# Patient Record
Sex: FEMALE | Race: BLACK OR AFRICAN AMERICAN | NOT HISPANIC OR LATINO | Employment: UNEMPLOYED | URBAN - METROPOLITAN AREA
[De-identification: names, ages, dates, MRNs, and addresses within clinical notes are randomized per-mention and may not be internally consistent; named-entity substitution may affect disease eponyms.]

---

## 2021-05-28 ENCOUNTER — TRANSFERRED RECORDS (OUTPATIENT)
Dept: HEALTH INFORMATION MANAGEMENT | Facility: CLINIC | Age: 51
End: 2021-05-28

## 2021-07-30 ENCOUNTER — TRANSFERRED RECORDS (OUTPATIENT)
Dept: HEALTH INFORMATION MANAGEMENT | Facility: CLINIC | Age: 51
End: 2021-07-30

## 2021-08-11 ENCOUNTER — TRANSFERRED RECORDS (OUTPATIENT)
Dept: HEALTH INFORMATION MANAGEMENT | Facility: CLINIC | Age: 51
End: 2021-08-11

## 2022-05-19 ENCOUNTER — TRANSFERRED RECORDS (OUTPATIENT)
Dept: HEALTH INFORMATION MANAGEMENT | Facility: CLINIC | Age: 52
End: 2022-05-19

## 2022-11-15 DIAGNOSIS — J84.9 ILD (INTERSTITIAL LUNG DISEASE) (H): Primary | ICD-10-CM

## 2022-12-09 ENCOUNTER — MEDICAL CORRESPONDENCE (OUTPATIENT)
Dept: HEALTH INFORMATION MANAGEMENT | Facility: CLINIC | Age: 52
End: 2022-12-09

## 2022-12-14 ENCOUNTER — TELEPHONE (OUTPATIENT)
Dept: PULMONOLOGY | Facility: CLINIC | Age: 52
End: 2022-12-14

## 2022-12-14 ENCOUNTER — TRANSCRIBE ORDERS (OUTPATIENT)
Dept: OTHER | Age: 52
End: 2022-12-14

## 2022-12-14 DIAGNOSIS — J84.9 ILD (INTERSTITIAL LUNG DISEASE) (H): Primary | ICD-10-CM

## 2022-12-14 NOTE — TELEPHONE ENCOUNTER
[facility] Call Center    Phone Message    May a detailed message be left on voicemail: yes     Reason for Call: Appointment Intake      Referring Provider Name: Dr James Lyons     Diagnosis and/or Symptoms: New ILD     ASAP referral     Action Taken: Message routed to:  [facility] ([facility] Pulm.    Travel Screening: Not Applicable

## 2022-12-15 NOTE — TELEPHONE ENCOUNTER
Contacted patient regarding referral.  She is currently scheduled to see Dr. Bustillo on February 1, 2023 and is requesting a sooner appointment.  Informed patient that we will call her if anything becomes available sooner. Patient is not in distress.  Patient would like an ABG done before she travels.  States that an order from the [facility] was sent to us for an ABG.  Writer left a message for nurse in pulmonary clinic at [facility] requesting an updated order to be faxed to the PFT lab or ILD department.

## 2022-12-19 DIAGNOSIS — J84.9 ILD (INTERSTITIAL LUNG DISEASE) (H): Primary | ICD-10-CM

## 2022-12-19 LAB
BASE EXCESS BLDA CALC-SCNC: 1.7 MMOL/L (ref -9.6–2)
COHGB MFR BLD: 0.4 % (ref 0–2)
HCO3 BLDA-SCNC: 26 MMOL/L (ref 21–28)
HGB BLD-MCNC: 13.5 G/DL (ref 11.7–15.7)
METHGB MFR BLD: 0.1 % (ref 0–3)
O2/TOTAL GAS SETTING VFR VENT: 21 %
OXYHGB MFR BLDA: 95 % (ref 92–100)
PCO2 BLDA: 41 MM HG (ref 35–45)
PH BLDA: 7.42 [PH] (ref 7.35–7.45)
PO2 BLDA: 88 MM HG (ref 80–105)

## 2022-12-19 PROCEDURE — 82810 BLOOD GASES O2 SAT ONLY: CPT | Performed by: PATHOLOGY

## 2022-12-19 PROCEDURE — 82375 ASSAY CARBOXYHB QUANT: CPT | Performed by: PATHOLOGY

## 2022-12-19 PROCEDURE — 36600 WITHDRAWAL OF ARTERIAL BLOOD: CPT | Performed by: INTERNAL MEDICINE

## 2022-12-19 PROCEDURE — 83050 HGB METHEMOGLOBIN QUAN: CPT | Performed by: PATHOLOGY

## 2022-12-19 PROCEDURE — 85018 HEMOGLOBIN: CPT | Performed by: PATHOLOGY

## 2022-12-19 PROCEDURE — 82803 BLOOD GASES ANY COMBINATION: CPT | Mod: 91 | Performed by: PATHOLOGY

## 2022-12-20 LAB — FIO2-PRE: 21 %

## 2023-01-30 ENCOUNTER — DOCUMENTATION ONLY (OUTPATIENT)
Dept: PULMONOLOGY | Facility: CLINIC | Age: 53
End: 2023-01-30
Payer: COMMERCIAL

## 2023-01-30 NOTE — PROGRESS NOTES
Action 1.30.23 MJ 3:37 PM.    Action Taken Received CD from [facility]. Sent to  for processing.

## 2023-01-31 NOTE — PROGRESS NOTES
[facility] Interstitial [facility] Disease [facility]    Reason for Visit  Angelica Jett is a 52 year old year old female who is being seen for known polymyositis related ILD.   HPI  Patient is 52 years old female [place] from [place] was in usual state of health until she delivered a healthy child back in 2005 and developed interstitial lung disease and associated muscle pain.  At the time, she reportedly had a lung biopsy in [place] which in association with polymyositis clinical diagnosis [reportedly had thigh MRI], received a diagnosis of ILD associated with polymyositis.  Her main immunosuppressive regimen has been mycophenolate mofetil, which has been decreased back in May 2022 to 1500 mg/day which appears to be due to PFT stability and symptom improvement.  Earlier in the disease, lung transplant was considered as she was needing oxygen, but as she responded to immunosuppression lung transplant has been deferred.  She has also been receiving pulmonary rehab with significant improvement in symptoms.  She admits to some interruption in her mycophenolate and with that her symptoms worsened back in 2022.  She also has known hepatitis B receiving Entecavir with normal liver function as of late 2022.  She works as a  and has no occupational exposures.  She is a lifetime non-smoker and denies secondhand smoke exposure.  She denies triggers for hypersensitivity pneumonitis.  She also had pulmonary hypertension (2013) and is needed Adcirca and revatio, but they were discontinued based on improvement in her pulmonary pressures on right heart cath 2019.    Review of Systems   Constitutional: Positive for weight loss. Negative for chills, fever and malaise/fatigue.   HENT: Negative for congestion, hearing loss and sore throat.    Respiratory: Positive for shortness of breath. Negative for cough, hemoptysis, sputum production and wheezing.    Gastrointestinal: Negative for heartburn.  "  Musculoskeletal: Negative for myalgias.   Skin: Negative for itching and rash.   Neurological: Negative for tremors, speech change and headaches.   Endo/Heme/Allergies: Does not bruise/bleed easily.   Psychiatric/Behavioral: Negative for memory loss.      Past medical history  Polymyositis 2005  Secondary pulmonary arterial hypertension, resolved in 2019    Past surgical history  Lung biopsy 2005  Right heart cath, most recent 2019    Social History     Socioeconomic History     Marital status:      Spouse name: Not on file     Number of children: Not on file     Years of education: Not on file     Highest education level: Not on file   Occupational History     Not on file   Tobacco Use     Smoking status: Not on file     Smokeless tobacco: Not on file   Substance and Sexual Activity     Alcohol use: Not on file     Drug use: Not on file     Sexual activity: Not on file   Other Topics Concern     Not on file   Social History Narrative     Not on file     Social Determinants of Health     Financial Resource Strain: Not on file   Food Insecurity: Not on file   Transportation Needs: Not on file   Physical Activity: Not on file   Stress: Not on file   Social Connections: Not on file   Intimate Partner Violence: Not on file   Housing Stability: Not on file       Family history  Negative for lupus, rheumatoid arthritis, Sjogren, scleroderma, or other connective tissue diseases    Vitals: /71   Pulse 73   Resp 17   Ht 1.676 m (5' 6\")   Wt 72.8 kg (160 lb 9.6 oz)   SpO2 98%   BMI 25.92 kg/m          Exam:   GENERAL APPEARANCE: Well developed, well nourished, alert, and in no apparent distress.  LYMPHATICS: No significant cervical, or supraclavicular nodes.  HEENT: Normal oral mucus membranes, tongue and dentition. Normal nose.   RESP: good air flow throughout.  Fine inspiratory rales more pronounced in the bases.  CV: Normal S1, S2, regular rhythm, normal rate. No murmur.  No LE edema.   MS: extremities " normal. No clubbing. No cyanosis.  SKIN: no rash on limited exam.  NEURO: Mentation intact, speech normal, normal gait and stance.  PSYCH: mentation appears normal. and affect normal/bright.    Results:    Chest imaging:  Reviewed high-resolution CT chest images from today 2/1/2023 with the patient and compared to May 2021  Unchanged typical UIP pattern with extensive honeycombing in both lower lobes, and subpleural distribution tracking up the upper lobes.  The CT chest from May 2021 did show mild diffuse groundglass opacities, favoring pulmonary edema at the time.      PFTs today 2/1/23    My interpretation: Moderate to severe intrathoracic restrictive disease    PFTs from outside May 2022  FVC 1.91  FEV1 1.61  FEV1/FVC 84  TLC 2.98  DLCO 7.74    PFTs from outside May 2021  FVC 1.86  FEV1 1.51  FEV1/FVC 81  TLC 3.22  DLCO 5.58    PFTs from outside May 2019  FEV1 1.51-59%, FVC 1.92-61%  TLC 2.15-41%  DLCO 31%    Echo from outside showed mild RV dysfunction and pulmonary artery systolic pressure 20-25  She has history of pulmonary arterial hypertension that followed pregnancy 2005, however a repeat right heart cath in 2019 showed no residual pulm hypertension    Right heart cath May 2018  RA 3  RV 35/8  PA 36/15 [23] from a previous mean high of 47 in 2011, but PCWP was 27 at the time  PCWP 11  PVR 2.2    Previous pulmonary hypertension therapies  Adcirca and revatio to 2013    Assessment and plan:   1. ILD and polymyositis  Patient is 52 years old female with known history of polymyositis since 2005 following pregnancy, and lung biopsy at the time with established UIP pattern.  The patient has recently moved in from [place] and would like to establish pulmonary, rheumatology and hepatology care at our institution.  Back in May 2022, her local pulmonologist at the time reduced her mycophenolate to 1000 mg in the morning and 500 in the evening, which appears to be due to PFT stability and symptomatic improvement.   She used to need oxygen at 4 L/min with activity, however on 6-minute walk today she did not need oxygen. I also see marked improvement in DLCO, which could be a combination effect of compliance with MMF and pulmonary rehab. CT shows UIP pattern today that is unchanged compared to 5/2021 but had suspected pulmonary edema superimposed back then.   Plan:  - Continue current MMF dose (1500 mg/d)  - MMF monitoring labs   - CBC for DLCO correction  - CMP, CRP, CK   - For UIP pattern, will check anti-CCP and ANCA r/o overlap   - Discussed Ofev which I am recommending, but given stability in symptoms and PFTs, the patient wants to consider it next visit. If we see decline in PFTs, we would strongly recommend it at the time. Ofev in the setting of hepatitis B usually can be given at a reduced dose with coordination with hepatology.   - Pulmonary rehab referral   - PFTs and six mins walk in 6 months   - Rheumatology referral to establish care locally     2. Hepatitis B on entecavir  - CMP per above  - Hepatology referral     3. Previous secondary PAH, revatio and adcirc discontinued in 2019 based on improvements in RHC  - NtproBNP today     4. Immunization  - Pneumovax 23 today   - COVID 4th dose at patient's pharmacy     Return in 6 months     I spent 60 minutes reviewing chart, reviewing test results, talking with and examining patient, formulating plan, and documentation on the day of the encounter.          Answers for HPI/ROS submitted by the patient on 2/1/2023  General Symptoms: No  EYE SYMPTOMS: No  HEART SYMPTOMS: No  LUNG SYMPTOMS: Yes  INTESTINAL SYMPTOMS: No  URINARY SYMPTOMS: No  GYNECOLOGIC SYMPTOMS: No  BREAST SYMPTOMS: No  SKELETAL SYMPTOMS: Yes  BLOOD SYMPTOMS: No  MENTAL HEALTH SYMPTOMS: No

## 2023-02-01 ENCOUNTER — LAB (OUTPATIENT)
Dept: LAB | Facility: CLINIC | Age: 53
End: 2023-02-01
Payer: COMMERCIAL

## 2023-02-01 ENCOUNTER — ANCILLARY PROCEDURE (OUTPATIENT)
Dept: CT IMAGING | Facility: CLINIC | Age: 53
End: 2023-02-01
Attending: INTERNAL MEDICINE
Payer: COMMERCIAL

## 2023-02-01 ENCOUNTER — OFFICE VISIT (OUTPATIENT)
Dept: PULMONOLOGY | Facility: CLINIC | Age: 53
End: 2023-02-01
Attending: PHYSICIAN ASSISTANT
Payer: COMMERCIAL

## 2023-02-01 VITALS
OXYGEN SATURATION: 98 % | HEART RATE: 73 BPM | BODY MASS INDEX: 25.81 KG/M2 | DIASTOLIC BLOOD PRESSURE: 71 MMHG | WEIGHT: 160.6 LBS | RESPIRATION RATE: 17 BRPM | SYSTOLIC BLOOD PRESSURE: 111 MMHG | HEIGHT: 66 IN

## 2023-02-01 DIAGNOSIS — J84.9 ILD (INTERSTITIAL LUNG DISEASE) (H): ICD-10-CM

## 2023-02-01 DIAGNOSIS — B18.1 CHRONIC VIRAL HEPATITIS B WITHOUT DELTA AGENT AND WITHOUT COMA (H): Primary | ICD-10-CM

## 2023-02-01 DIAGNOSIS — M60.10: ICD-10-CM

## 2023-02-01 DIAGNOSIS — J84.9 ILD (INTERSTITIAL LUNG DISEASE) (H): Primary | ICD-10-CM

## 2023-02-01 DIAGNOSIS — Z23 IMMUNIZATION DUE: ICD-10-CM

## 2023-02-01 LAB
6 MIN WALK (FT): 1150 FT
6 MIN WALK (M): 351 M
ALBUMIN SERPL BCG-MCNC: 4.1 G/DL (ref 3.5–5.2)
ALP SERPL-CCNC: 86 U/L (ref 35–104)
ALT SERPL W P-5'-P-CCNC: 17 U/L (ref 10–35)
ANION GAP SERPL CALCULATED.3IONS-SCNC: 13 MMOL/L (ref 7–15)
AST SERPL W P-5'-P-CCNC: 37 U/L (ref 10–35)
BASOPHILS # BLD AUTO: 0.1 10E3/UL (ref 0–0.2)
BASOPHILS NFR BLD AUTO: 1 %
BILIRUB SERPL-MCNC: 0.3 MG/DL
BUN SERPL-MCNC: 12.6 MG/DL (ref 6–20)
CALCIUM SERPL-MCNC: 9.8 MG/DL (ref 8.6–10)
CHLORIDE SERPL-SCNC: 103 MMOL/L (ref 98–107)
CK SERPL-CCNC: 145 U/L (ref 26–192)
CREAT SERPL-MCNC: 0.82 MG/DL (ref 0.51–0.95)
CRP SERPL-MCNC: 5.78 MG/L
DEPRECATED HCO3 PLAS-SCNC: 23 MMOL/L (ref 22–29)
DLCOUNC-%PRED-PRE: 53 %
DLCOUNC-PRE: 11.97 ML/MIN/MMHG
DLCOUNC-PRED: 22.24 ML/MIN/MMHG
EOSINOPHIL # BLD AUTO: 0.1 10E3/UL (ref 0–0.7)
EOSINOPHIL NFR BLD AUTO: 1 %
ERV-%PRED-PRE: 65 %
ERV-PRE: 0.61 L
ERV-PRED: 0.93 L
ERYTHROCYTE [DISTWIDTH] IN BLOOD BY AUTOMATED COUNT: 14.6 % (ref 10–15)
EXPTIME-PRE: 4.5 SEC
FEF2575-%PRED-PRE: 72 %
FEF2575-PRE: 1.85 L/SEC
FEF2575-PRED: 2.57 L/SEC
FEFMAX-%PRED-PRE: 76 %
FEFMAX-PRE: 5.33 L/SEC
FEFMAX-PRED: 6.94 L/SEC
FEV1-%PRED-PRE: 56 %
FEV1-PRE: 1.52 L
FEV1FEV6-PRE: 87 %
FEV1FEV6-PRED: 82 %
FEV1FVC-PRE: 87 %
FEV1FVC-PRED: 81 %
FEV1SVC-PRE: 81 %
FEV1SVC-PRED: 73 %
FIFMAX-PRE: 5.27 L/SEC
FRCPLETH-%PRED-PRE: 73 %
FRCPLETH-PRE: 2.07 L
FRCPLETH-PRED: 2.81 L
FVC-%PRED-PRE: 52 %
FVC-PRE: 1.75 L
FVC-PRED: 3.33 L
GFR SERPL CREATININE-BSD FRML MDRD: 85 ML/MIN/1.73M2
GLUCOSE SERPL-MCNC: 95 MG/DL (ref 70–99)
HCT VFR BLD AUTO: 44.2 % (ref 35–47)
HGB BLD-MCNC: 13.6 G/DL (ref 11.7–15.7)
IC-%PRED-PRE: 46 %
IC-PRE: 1.27 L
IC-PRED: 2.74 L
IMM GRANULOCYTES # BLD: 0 10E3/UL
IMM GRANULOCYTES NFR BLD: 0 %
LYMPHOCYTES # BLD AUTO: 2.9 10E3/UL (ref 0.8–5.3)
LYMPHOCYTES NFR BLD AUTO: 29 %
MCH RBC QN AUTO: 24.6 PG (ref 26.5–33)
MCHC RBC AUTO-ENTMCNC: 30.8 G/DL (ref 31.5–36.5)
MCV RBC AUTO: 80 FL (ref 78–100)
MONOCYTES # BLD AUTO: 0.7 10E3/UL (ref 0–1.3)
MONOCYTES NFR BLD AUTO: 7 %
NEUTROPHILS # BLD AUTO: 6.3 10E3/UL (ref 1.6–8.3)
NEUTROPHILS NFR BLD AUTO: 62 %
NRBC # BLD AUTO: 0 10E3/UL
NRBC BLD AUTO-RTO: 0 /100
NT-PROBNP SERPL-MCNC: 170 PG/ML (ref 0–900)
PLATELET # BLD AUTO: 133 10E3/UL (ref 150–450)
POTASSIUM SERPL-SCNC: 4.7 MMOL/L (ref 3.4–5.3)
PROT SERPL-MCNC: 8.6 G/DL (ref 6.4–8.3)
RBC # BLD AUTO: 5.53 10E6/UL (ref 3.8–5.2)
RVPLETH-%PRED-PRE: 77 %
RVPLETH-PRE: 1.46 L
RVPLETH-PRED: 1.88 L
SODIUM SERPL-SCNC: 139 MMOL/L (ref 136–145)
TLCPLETH-%PRED-PRE: 63 %
TLCPLETH-PRE: 3.33 L
TLCPLETH-PRED: 5.27 L
VA-%PRED-PRE: 48 %
VA-PRE: 2.58 L
VC-%PRED-PRE: 51 %
VC-PRE: 1.87 L
VC-PRED: 3.67 L
WBC # BLD AUTO: 10.1 10E3/UL (ref 4–11)

## 2023-02-01 PROCEDURE — 83880 ASSAY OF NATRIURETIC PEPTIDE: CPT | Performed by: PATHOLOGY

## 2023-02-01 PROCEDURE — 250N000011 HC RX IP 250 OP 636: Performed by: INTERNAL MEDICINE

## 2023-02-01 PROCEDURE — 86036 ANCA SCREEN EACH ANTIBODY: CPT | Mod: 90 | Performed by: PATHOLOGY

## 2023-02-01 PROCEDURE — 80053 COMPREHEN METABOLIC PANEL: CPT | Performed by: PATHOLOGY

## 2023-02-01 PROCEDURE — 86038 ANTINUCLEAR ANTIBODIES: CPT | Mod: 90 | Performed by: PATHOLOGY

## 2023-02-01 PROCEDURE — 86200 CCP ANTIBODY: CPT | Mod: 90 | Performed by: PATHOLOGY

## 2023-02-01 PROCEDURE — 71250 CT THORAX DX C-: CPT | Performed by: RADIOLOGY

## 2023-02-01 PROCEDURE — 99205 OFFICE O/P NEW HI 60 MIN: CPT | Mod: 25 | Performed by: INTERNAL MEDICINE

## 2023-02-01 PROCEDURE — 94726 PLETHYSMOGRAPHY LUNG VOLUMES: CPT | Performed by: INTERNAL MEDICINE

## 2023-02-01 PROCEDURE — 86140 C-REACTIVE PROTEIN: CPT | Performed by: PATHOLOGY

## 2023-02-01 PROCEDURE — G0463 HOSPITAL OUTPT CLINIC VISIT: HCPCS | Mod: 25 | Performed by: INTERNAL MEDICINE

## 2023-02-01 PROCEDURE — 86256 FLUORESCENT ANTIBODY TITER: CPT | Mod: 90 | Performed by: PATHOLOGY

## 2023-02-01 PROCEDURE — 36415 COLL VENOUS BLD VENIPUNCTURE: CPT | Performed by: PATHOLOGY

## 2023-02-01 PROCEDURE — 90732 PPSV23 VACC 2 YRS+ SUBQ/IM: CPT | Performed by: INTERNAL MEDICINE

## 2023-02-01 PROCEDURE — 94729 DIFFUSING CAPACITY: CPT | Performed by: INTERNAL MEDICINE

## 2023-02-01 PROCEDURE — 99000 SPECIMEN HANDLING OFFICE-LAB: CPT | Performed by: PATHOLOGY

## 2023-02-01 PROCEDURE — 94375 RESPIRATORY FLOW VOLUME LOOP: CPT | Performed by: INTERNAL MEDICINE

## 2023-02-01 PROCEDURE — 85025 COMPLETE CBC W/AUTO DIFF WBC: CPT | Performed by: PATHOLOGY

## 2023-02-01 PROCEDURE — G0009 ADMIN PNEUMOCOCCAL VACCINE: HCPCS | Performed by: INTERNAL MEDICINE

## 2023-02-01 PROCEDURE — 82550 ASSAY OF CK (CPK): CPT | Performed by: PATHOLOGY

## 2023-02-01 PROCEDURE — 94618 PULMONARY STRESS TESTING: CPT | Performed by: INTERNAL MEDICINE

## 2023-02-01 RX ORDER — MYCOPHENOLATE MOFETIL 500 MG/1
1500 TABLET ORAL
COMMUNITY
Start: 2022-11-01 | End: 2023-10-12

## 2023-02-01 RX ORDER — FLUTICASONE FUROATE AND VILANTEROL 100; 25 UG/1; UG/1
1 POWDER RESPIRATORY (INHALATION) DAILY
COMMUNITY
Start: 2021-02-23 | End: 2023-04-27

## 2023-02-01 RX ORDER — IBUPROFEN 600 MG/1
1 TABLET, FILM COATED ORAL 4 TIMES DAILY PRN
COMMUNITY
Start: 2022-03-31

## 2023-02-01 RX ORDER — ENTECAVIR 0.5 MG/1
TABLET, FILM COATED ORAL
COMMUNITY
End: 2023-05-18

## 2023-02-01 RX ORDER — PREDNISONE 1 MG/1
1 TABLET ORAL
COMMUNITY
Start: 2022-05-19 | End: 2023-04-12

## 2023-02-01 RX ORDER — THIAMINE HCL 100 MG
2 TABLET ORAL
COMMUNITY
Start: 2022-06-06

## 2023-02-01 RX ADMIN — PNEUMOCOCCAL VACCINE POLYVALENT 0.5 ML
25; 25; 25; 25; 25; 25; 25; 25; 25; 25; 25; 25; 25; 25; 25; 25; 25; 25; 25; 25; 25; 25; 25 INJECTION, SOLUTION INTRAMUSCULAR; SUBCUTANEOUS at 09:21

## 2023-02-01 ASSESSMENT — ENCOUNTER SYMPTOMS
FEVER: 0
COUGH: 0
SPUTUM PRODUCTION: 0
SHORTNESS OF BREATH: 1
WHEEZING: 0
MEMORY LOSS: 0
MYALGIAS: 0
HEARTBURN: 0
HEMOPTYSIS: 0
SPEECH CHANGE: 0
WEIGHT LOSS: 1
BRUISES/BLEEDS EASILY: 0
TREMORS: 0
HEADACHES: 0
CHILLS: 0
SORE THROAT: 0

## 2023-02-01 ASSESSMENT — PAIN SCALES - GENERAL: PAINLEVEL: NO PAIN (0)

## 2023-02-01 NOTE — LETTER
Date:February 1, 2023      Patient was self referred, no letter generated. Do not send.        [facility] Health Information

## 2023-02-01 NOTE — LETTER
2/1/2023         RE: Angelica Jett  [address]  [id number]  [place] [address]        Dear Colleague,    Thank you for referring your patient, Angelica Jett, to the [facility]. Please see a copy of my visit note below.    [facility]    Reason for Visit  Angelica Jett is a 52 year old year old female who is being seen for known polymyositis related ILD.   HPI  Patient is 52 years old female [place] was in usual state of health until she delivered a healthy child back in 2005 and developed interstitial lung disease and associated muscle pain.  At the time, she reportedly had a lung biopsy in [place] which in association with polymyositis clinical diagnosis [reportedly had thigh MRI], received a diagnosis of ILD associated with polymyositis.  Her main immunosuppressive regimen has been mycophenolate mofetil, which has been decreased back in May 2022 to 1500 mg/day which appears to be due to PFT stability and symptom improvement.  Earlier in the disease, lung transplant was considered as she was needing oxygen, but as she responded to immunosuppression lung transplant has been deferred.  She has also been receiving pulmonary rehab with significant improvement in symptoms.  She admits to some interruption in her mycophenolate and with that her symptoms worsened back in 2022.  She also has known hepatitis B receiving Entecavir with normal liver function as of late 2022.  She works as a  and has no occupational exposures.  She is a lifetime non-smoker and denies secondhand smoke exposure.  She denies triggers for hypersensitivity pneumonitis.  She also had pulmonary hypertension (2013) and is needed Adcirca and revatio, but they were discontinued based on improvement in her pulmonary pressures on right heart cath 2019.    Review of Systems   Constitutional:  "Positive for weight loss. Negative for chills, fever and malaise/fatigue.   HENT: Negative for congestion, hearing loss and sore throat.    Respiratory: Positive for shortness of breath. Negative for cough, hemoptysis, sputum production and wheezing.    Gastrointestinal: Negative for heartburn.   Musculoskeletal: Negative for myalgias.   Skin: Negative for itching and rash.   Neurological: Negative for tremors, speech change and headaches.   Endo/Heme/Allergies: Does not bruise/bleed easily.   Psychiatric/Behavioral: Negative for memory loss.      Past medical history  Polymyositis 2005  Secondary pulmonary arterial hypertension, resolved in 2019    Past surgical history  Lung biopsy 2005  Right heart cath, most recent 2019    Social History     Socioeconomic History     Marital status:      Spouse name: Not on file     Number of children: Not on file     Years of education: Not on file     Highest education level: Not on file   Occupational History     Not on file   Tobacco Use     Smoking status: Not on file     Smokeless tobacco: Not on file   Substance and Sexual Activity     Alcohol use: Not on file     Drug use: Not on file     Sexual activity: Not on file   Other Topics Concern     Not on file   Social History Narrative     Not on file     Social Determinants of Health     Financial Resource Strain: Not on file   Food Insecurity: Not on file   Transportation Needs: Not on file   Physical Activity: Not on file   Stress: Not on file   Social Connections: Not on file   Intimate Partner Violence: Not on file   Housing Stability: Not on file       Family history  Negative for lupus, rheumatoid arthritis, Sjogren, scleroderma, or other connective tissue diseases    Vitals: /71   Pulse 73   Resp 17   Ht 1.676 m (5' 6\")   Wt 72.8 kg (160 lb 9.6 oz)   SpO2 98%   BMI 25.92 kg/m          Exam:   GENERAL APPEARANCE: Well developed, well nourished, alert, and in no apparent distress.  LYMPHATICS: No " significant cervical, or supraclavicular nodes.  HEENT: Normal oral mucus membranes, tongue and dentition. Normal nose.   RESP: good air flow throughout.  Fine inspiratory rales more pronounced in the bases.  CV: Normal S1, S2, regular rhythm, normal rate. No murmur.  No LE edema.   MS: extremities normal. No clubbing. No cyanosis.  SKIN: no rash on limited exam.  NEURO: Mentation intact, speech normal, normal gait and stance.  PSYCH: mentation appears normal. and affect normal/bright.    Results:    Chest imaging:  Reviewed high-resolution CT chest images from today 2/1/2023 with the patient and compared to May 2021  Unchanged typical UIP pattern with extensive honeycombing in both lower lobes, and subpleural distribution tracking up the upper lobes.  The CT chest from May 2021 did show mild diffuse groundglass opacities, favoring pulmonary edema at the time.      PFTs today 2/1/23    My interpretation: Moderate to severe intrathoracic restrictive disease    PFTs from outside May 2022  FVC 1.91  FEV1 1.61  FEV1/FVC 84  TLC 2.98  DLCO 7.74    PFTs from outside May 2021  FVC 1.86  FEV1 1.51  FEV1/FVC 81  TLC 3.22  DLCO 5.58    PFTs from outside May 2019  FEV1 1.51-59%, FVC 1.92-61%  TLC 2.15-41%  DLCO 31%    Echo from outside showed mild RV dysfunction and pulmonary artery systolic pressure 20-25  She has history of pulmonary arterial hypertension that followed pregnancy 2005, however a repeat right heart cath in 2019 showed no residual pulm hypertension    Right heart cath May 2018  RA 3  RV 35/8  PA 36/15 [23] from a previous mean high of 47 in 2011, but PCWP was 27 at the time  PCWP 11  PVR 2.2    Previous pulmonary hypertension therapies  Adcirca and revatio to 2013    Assessment and plan:   1. ILD and polymyositis  Patient is 52 years old female with known history of polymyositis since 2005 following pregnancy, and lung biopsy at the time with established UIP pattern.  The patient has recently moved in from  [place] and would like to establish pulmonary, rheumatology and hepatology care at our institution.  Back in May 2022, her local pulmonologist at the time reduced her mycophenolate to 1000 mg in the morning and 500 in the evening, which appears to be due to PFT stability and symptomatic improvement.  She used to need oxygen at 4 L/min with activity, however on 6-minute walk today she did not need oxygen. I also see marked improvement in DLCO, which could be a combination effect of compliance with MMF and pulmonary rehab. CT shows UIP pattern today that is unchanged compared to 5/2021 but had suspected pulmonary edema superimposed back then.   Plan:  - Continue current MMF dose (1500 mg/d)  - MMF monitoring labs   - CBC for DLCO correction  - CMP, CRP, CK   - For UIP pattern, will check anti-CCP and ANCA r/o overlap   - Discussed Ofev which I am recommending, but given stability in symptoms and PFTs, the patient wants to consider it next visit. If we see decline in PFTs, we would strongly recommend it at the time. Ofev in the setting of hepatitis B usually can be given at a reduced dose with coordination with hepatology.   - Pulmonary rehab referral   - PFTs and six mins walk in 6 months   - Rheumatology referral to establish care locally     2. Hepatitis B on entecavir  - CMP per above  - Hepatology referral     3. Previous secondary PAH, revatio and adcirc discontinued in 2019 based on improvements in RHC  - NtproBNP today     4. Immunization  - Pneumovax 23 today   - COVID 4th dose at patient's pharmacy     Return in 6 months     I spent 60 minutes reviewing chart, reviewing test results, talking with and examining patient, formulating plan, and documentation on the day of the encounter.          Answers for HPI/ROS submitted by the patient on 2/1/2023  General Symptoms: No  EYE SYMPTOMS: No  HEART SYMPTOMS: No  LUNG SYMPTOMS: Yes  INTESTINAL SYMPTOMS: No  URINARY SYMPTOMS: No  GYNECOLOGIC SYMPTOMS: No  BREAST  SYMPTOMS: No  SKELETAL SYMPTOMS: Yes  BLOOD SYMPTOMS: No  MENTAL HEALTH SYMPTOMS: No          Again, thank you for allowing me to participate in the care of your patient.        Sincerely,        Garrison Bustillo MD

## 2023-02-01 NOTE — NURSING NOTE
Met with patient in clinic after provider visit to provide contact information sheet and introduce role as ILD RN Care Coordinator. Patient appreciative of conversation and direct contact information. Discussed pulm rehab, pt would like to go to [place] location.  Ordered PCP referral to est care and f/u with breast lesion seen on CT scan (unchanged from 5/2021 CT). Pt was seeing providers in [place] and now wants to est care in [place].

## 2023-02-01 NOTE — PROGRESS NOTES
Action 2.1.23 MJ   Action Taken Received CD from [facility]. Sent to [facility] for processing.

## 2023-02-01 NOTE — NURSING NOTE
Chief Complaint   Patient presents with     Consult     New Interstitial Lung    Medications reviewed and vital signs taken.   Jake Miller, BELKIS

## 2023-02-02 LAB
ANA SER QL IF: NEGATIVE
ANCA AB PATTERN SER IF-IMP: NORMAL
C-ANCA TITR SER IF: NORMAL {TITER}
CCP AB SER IA-ACNC: 1.4 U/ML

## 2023-02-02 NOTE — TELEPHONE ENCOUNTER
DIAGNOSIS: Chronic viral hepatitis B without delta agent and without coma    Appt Date: 03.22.2023   NOTES STATUS DETAILS   OFFICE NOTE from referring provider Internal 02.01.2023 Garrison Bustillo MD   OFFICE NOTES from other specialists Care Everywhere 04.13.2022 Vijay Denney MD  [facility]    03.26.2021 Malorie Interiano MD  [facility]   DISCHARGE SUMMARY from hospital     MEDICATION LIST Internal / CE    LIVER BIOSPY (IF APPLICABLE)      PATHOLOGY REPORTS      IMAGING     ENDOSCOPY (IF AVAILABLE) Care Everywhere 05.28.2021   COLONOSCOPY (IF AVAILABLE)     ULTRASOUND LIVER Care Everywhere 04.14.2022, 08.06.2021 US abdomen complete   CT OF ABDOMEN     MRI OF LIVER     FIBROSCAN, US ELASTOGRAPHY, FIBROSIS SCAN, MR ELASTOGRAPHY Care Everywhere 07.07.2021 Fribroscan   LABS     HEPATIC PANEL (LIVER PANEL)     BASIC METABOLIC PANEL Care Everywhere 10.12.2022   COMPLETE METABOLIC PANEL Internal 02.01.2023   COMPLETE BLOOD COUNT (CBC) Internal 02.01.2023   INTERNATIONAL NORMALIZED RATIO (INR)     HEPATITIS C ANTIBODY     HEPATITIS C VIRAL LOAD/PCR     HEPATITIS C GENOTYPE     HEPATITIS B SURFACE ANTIGEN Care Everywhere 11.18.2021   HEPATITIS B SURFACE ANTIBODY Care Everywhere 11.18.2021   HEPATITIS B DNA QUANT LEVEL     HEPATITIS B CORE ANTIBODY Care Everywhere 07.07.2021     Action 02.02.2023 RM   Action Taken Pending images      Action 02.07.2023 RM 1:52P   Action Taken Called [facility] at [phone]  was told to fax to [phone] for images pending for disc.     Action 03.06.2023 RM   Action Taken Images received via disc took to 4n to be uploaded to chart.

## 2023-02-07 NOTE — TELEPHONE ENCOUNTER
NOTES Status Details   OFFICE NOTE from referring provider Internal 02.01.2023 Garrison Bustillo MD   OFFICE NOTE from other specialist     DISCHARGE SUMMARY from hospital     DISCHARGE REPORT from the ER     MEDICATION LIST Internal    LABS (Any and all labs)      Internal    Biopsy/pathology (Anything related to diagnoses I.e. fluid aspirations, lip biopsy, muscle biopsy)               Imaging (All imaging related to diagnoses)     Echo     HRCT Internal 02.01.2023 CT chest high resolution without contrast   CXR     EMG                    Scleroderma/Dermatomyositis diagnoses     Previous Cardiology notes      Previous Pulmonary notes     Previous Dermatology notes     Previous GI notes     Lupus diagnoses     Previous Nephrology notes     Previous Dermatology notes     Previous Cardiology notes

## 2023-02-12 ENCOUNTER — HEALTH MAINTENANCE LETTER (OUTPATIENT)
Age: 53
End: 2023-02-12

## 2023-02-13 ENCOUNTER — OFFICE VISIT (OUTPATIENT)
Dept: FAMILY MEDICINE | Facility: CLINIC | Age: 53
End: 2023-02-13
Attending: INTERNAL MEDICINE
Payer: COMMERCIAL

## 2023-02-13 VITALS
OXYGEN SATURATION: 99 % | HEART RATE: 85 BPM | DIASTOLIC BLOOD PRESSURE: 67 MMHG | RESPIRATION RATE: 20 BRPM | BODY MASS INDEX: 26.03 KG/M2 | WEIGHT: 162 LBS | TEMPERATURE: 98 F | SYSTOLIC BLOOD PRESSURE: 103 MMHG | HEIGHT: 66 IN

## 2023-02-13 DIAGNOSIS — I27.20 PULMONARY HYPERTENSION (H): ICD-10-CM

## 2023-02-13 DIAGNOSIS — R20.2 NUMBNESS AND TINGLING OF FOOT: ICD-10-CM

## 2023-02-13 DIAGNOSIS — R20.0 NUMBNESS AND TINGLING OF FOOT: ICD-10-CM

## 2023-02-13 DIAGNOSIS — Z12.31 VISIT FOR SCREENING MAMMOGRAM: ICD-10-CM

## 2023-02-13 DIAGNOSIS — B18.1 CHRONIC VIRAL HEPATITIS B WITHOUT DELTA AGENT AND WITHOUT COMA (H): ICD-10-CM

## 2023-02-13 DIAGNOSIS — J84.9 ILD (INTERSTITIAL LUNG DISEASE) (H): Primary | ICD-10-CM

## 2023-02-13 LAB
HBA1C MFR BLD: 5.9 % (ref 0–5.6)
VIT B12 SERPL-MCNC: 816 PG/ML (ref 232–1245)

## 2023-02-13 PROCEDURE — 82607 VITAMIN B-12: CPT | Performed by: FAMILY MEDICINE

## 2023-02-13 PROCEDURE — 36415 COLL VENOUS BLD VENIPUNCTURE: CPT | Performed by: FAMILY MEDICINE

## 2023-02-13 PROCEDURE — 99204 OFFICE O/P NEW MOD 45 MIN: CPT | Performed by: FAMILY MEDICINE

## 2023-02-13 PROCEDURE — 83036 HEMOGLOBIN GLYCOSYLATED A1C: CPT | Performed by: FAMILY MEDICINE

## 2023-02-13 ASSESSMENT — PAIN SCALES - GENERAL: PAINLEVEL: NO PAIN (0)

## 2023-02-13 NOTE — PROGRESS NOTES
Assessment & Plan     ILD (interstitial lung disease) (H)  I recommended she continue her current medications and keep following closely with pulmonology.  Looks like she was just seen by them and was referred to rheumatology and pulmonary rehab.  They are recommending repeat PFTs in 6 months.  - Primary Care Referral    Chronic viral hepatitis B without delta agent and without coma (H)  She was recently referred to gastroenterology and is currently on Entecavir      Pulmonary hypertension (H)  Is a history of pulmonary hypertension which has been stable.    Numbness and tingling of foot  We discussed further work-up for the numbness and tingling symptoms in her first and second toes of the right foot and I recommended adding a vitamin B12 and hemoglobin A1c level today.  Last April her A1c was 5.6% and her recent CMP and CBC results were unremarkable through pulmonology.  - Vitamin B12; Future  - Hemoglobin A1c; Future  - Vitamin B12  - Hemoglobin A1c    Visit for screening mammogram  - MA SCREENING DIGITAL BILAT - Future  (s+30); Future           MED REC REQUIRED  Post Medication Reconciliation Status:  Discharge medications reconciled, continue medications without change        Return in about 3 months (around 5/13/2023) for Annual Exam.    Lino Godoy, [facility]   Angelica is a 52 year old, presenting for the following health issues:  Hospital F/U, Establish Care, and Foot Problems (Bilat feet numbness)      HPI     Establish care/foot numbness        She has a medical history significant for interstitial lung disease associated with polymyositis.  She was diagnosed with this in 2005 after having a biopsy when living in [place].  She reports doing well on immunosuppressive therapy, mycophenolate and low-dose prednisone.  She takes Breo as well.  She feels like things are stable from a respiratory standpoint. She is also on Entecavir for chronic hepatitis B.   "She was seen by pulmonology at the [facility] on 2/1/2023 and they referred her to pulmonary rehab and rheumatology.  They recommended PFTs in 6 months.  An echocardiogram in the past showed evidence of pulmonary hypertension with a pulmonary artery systolic pressure of 20-25.    She recently started noticing some numbness and tingling and burning symptoms in her right first and second toes.  The toes appear normal.  They are not swollen or red.  She denies any specific injury.    On 2/1/2023 the CBC showed slightly low platelets of 133, CMP results showed a mildly elevated AST of 37, total CK level is normal, CRP mildly elevated at 5.78, BNP normal and PETER and ANCA IgG testing negative.    Social history: .  She works as an .  Her  is a physician and working in public health at the [facility].  She is originally from [place] moved to the  in the late 1980s    Review of Systems   Constitutional, HEENT, cardiovascular, pulmonary, GI, , musculoskeletal, neuro, skin, endocrine and psych systems are negative, except as otherwise noted.      Objective    /67   Pulse 85   Temp 98  F (36.7  C) (Temporal)   Resp 20   Ht 1.676 m (5' 6\")   Wt 73.5 kg (162 lb)   SpO2 99%   BMI 26.15 kg/m    Body mass index is 26.15 kg/m .  Physical Exam   GENERAL: healthy, alert and no distress  EYES: Eyes grossly normal to inspection, PERRL and conjunctivae and sclerae normal  HENT:  nose and mouth without ulcers or lesions  NECK: no adenopathy, no asymmetry, masses, or scars and thyroid normal to palpation  RESP: Slightly coarse breath sounds throughout- no rales, rhonchi or wheezes  CV: regular rate and rhythm, normal S1 S2, no S3 or S4, no murmur, click or rub, no peripheral edema and peripheral pulses strong  ABDOMEN: soft, nontender, no hepatosplenomegaly, no masses and bowel sounds normal  MS: no gross musculoskeletal defects noted, no edema  SKIN: no suspicious lesions or " rashes  NEURO: Normal strength and tone, mentation intact and speech normal  PSYCH: mentation appears normal, affect normal/bright

## 2023-02-13 NOTE — PROGRESS NOTES
"  {PROVIDER CHARTING PREFERENCE:139434}    Ana Luisa Dominguez is a 52 year old{ACCOMPANIED BY STATEMENT (Optional):021768}, presenting for the following health issues:  Hospital F/U      HPI       Hospital Follow-up Visit:    Hospital/Nursing Home/IP Rehab Facility: {INPT AND SNF DISCHARGE:799320}  Date of Admission: ***  Date of Discharge: ***  Reason(s) for Admission: ***    Was your hospitalization related to COVID-19? {Yes add questions_No:962837}  Problems taking medications regularly:  {NONE DEFAULTED:242506::\"None\"}  Medication changes since discharge: {NONE DEFAULTED:114627::\"None\"}  Problems adhering to non-medication therapy:  {NONE DEFAULTED:118292::\"None\"}    Summary of hospitalization:  {HOSPITAL DISCHARGE SUMMARY INFO:764155::\"[facility] hospital discharge summary reviewed\"}  Diagnostic Tests/Treatments reviewed.  Follow up needed: {NONE DEFAULTED:948295::\"none\"}  Other Healthcare Providers Involved in Patient s Care:         {those currently involved after discharge:108041::\"None\"}  Update since discharge: {IMPROVED DEFAULT:670460::\"improved.\"} {TIP  Include information from family/caregivers, SNF, Care Coordination :853857}        Plan of care communicated with {Communicate Plan to:740104::\"patient\"}     {Reference  Coding guidelines- Moderate Complexity F2F/Video within 7 - 14 days of discharge 6338648, High Complexity F2F/Video within 7 days 1234445 or sjifkl31 days 5583037 :951012}      {additonal problems for provider to add (Optional):564828}  {additonal problems for provider to add (Optional):810349}    Review of Systems   {ROS COMP (Optional):611195}      Objective    There were no vitals taken for this visit.  There is no height or weight on file to calculate BMI.  Physical Exam   {Exam List (Optional):407785}    {Diagnostic Test Results (Optional):859451}    {AMBULATORY ATTESTATION (Optional):815476}            "

## 2023-03-02 ENCOUNTER — HOSPITAL ENCOUNTER (OUTPATIENT)
Dept: CARDIAC REHAB | Facility: CLINIC | Age: 53
Discharge: HOME | End: 2023-03-02
Attending: INTERNAL MEDICINE
Payer: COMMERCIAL

## 2023-03-02 DIAGNOSIS — J84.9 ILD (INTERSTITIAL LUNG DISEASE) (H): ICD-10-CM

## 2023-03-02 PROCEDURE — G0238 OTH RESP PROC, INDIV: HCPCS | Performed by: REHABILITATION PRACTITIONER

## 2023-03-22 ENCOUNTER — PRE VISIT (OUTPATIENT)
Dept: GASTROENTEROLOGY | Facility: CLINIC | Age: 53
End: 2023-03-22

## 2023-03-23 ENCOUNTER — DOCUMENTATION ONLY (OUTPATIENT)
Dept: LAB | Facility: CLINIC | Age: 53
End: 2023-03-23
Payer: COMMERCIAL

## 2023-03-23 DIAGNOSIS — B18.1 CHRONIC VIRAL HEPATITIS B WITHOUT DELTA AGENT AND WITHOUT COMA (H): Primary | ICD-10-CM

## 2023-03-23 DIAGNOSIS — Z11.59 ENCOUNTER FOR SCREENING FOR OTHER VIRAL DISEASES: ICD-10-CM

## 2023-04-03 ENCOUNTER — HOSPITAL ENCOUNTER (OUTPATIENT)
Dept: MAMMOGRAPHY | Facility: CLINIC | Age: 53
Discharge: HOME | End: 2023-04-03
Attending: FAMILY MEDICINE | Admitting: FAMILY MEDICINE
Payer: COMMERCIAL

## 2023-04-03 DIAGNOSIS — Z12.31 VISIT FOR SCREENING MAMMOGRAM: ICD-10-CM

## 2023-04-03 PROCEDURE — 77067 SCR MAMMO BI INCL CAD: CPT

## 2023-04-11 NOTE — PROGRESS NOTES
Rheumatology Clinic Initial Consult  04/12/23    Reason for visit: ILD/polymyositis     HPI:    Angelica Jett is a 52 year old female with a history of  chronic hepatitis B (on Entrcavir), GERD, glucocorticoid induced osteoporosis, polymyositis with interstitial lung disease with irritable bowel syndrome who presents to rheumatology to establish care. [place]. Patient was initially diagnosed with polymyositis and interstitial lung disease in 2005, after presenting with severe bilateral upper and lower extremity proximal muscle weakness. She reports first noticing shortness of breath after giving birth to her baby in 2003 with weakness noted in 2004 after she noticed she could no longer lift her son. She denies presence of rashes or mechanics hands on presentation or later through out the disease course. She had undergone MRI muscles but denies ever having a muscle biopsy. She was initially under the care of Dr. Alan at [facility] in [place] and later at [facility]. Patient reports initially starting treatment with CellCept 1000 mg twice daily and prednisone 60 mg daily. Since diagnosis in 2005 she was able to taper down her prednisone successfully and was able to stop prednisone 1 mg in February 2023. She recalls one flare of polymyositis on prednisone taper in 2009 but didn't experience any additional flare on continued taper. Patient is currently doing well on Cellcept 1500 mg  Daily. Cellcept dose was decreased given stability in PFTs. She denies any issues with muscle weakness. She doesn't feel shortness of breath on regular activity but experiences dyspnea on exertional activity. She sometimes uses supplemental oxygen on exertional activity. Patient was listed on transplant list but her condition stabilized with immunosuppressive therapy. Apparently she has also been receiving pulmonary rehab with improvement in her symptoms. Patient has  hx of chronic hepatitis B on Entecavir - she has negative Hep B e antigen with positive hep B e antibody and low titer HBV quant < 10/ ml. She has scheduled to follow up with hepatology. She has normal renal function. Patient has hx osteoporosis secondary to glucocorticoid use. She was previously on Reclast x 3 yrs but off since 2016. Patient reports intermittent hx of paresthesias in her feet. She also had diagnosis of pulmonary hypertension in 2012 and required Adcirca and revatio that was apparently discontinued given improvement in pulmonary pressures on right heart cath in 2019.  She is tolerating Cellcept well. She also has hx of osteoporosis secondary to glucocorticoid us. She was previously on n Reclast for 3 years but has been off all therapy since about 2016. She continues to take ca and vitamin D. Last DEXA ~5 yrs ago. She hasn't had repeat Dexa scan. She was previously working as . Denies family hx of autoimmune conditions, no smoking or drinking hx in past.     Rest of ROS is negative except in HPI       Past Medical History  Reviewed, per HPI  Past Surgical History:   Procedure Laterality Date     BIOPSY  2005     COLONOSCOPY  05/28/2021     Social History     Socioeconomic History     Marital status:      Spouse name: Not on file     Number of children: Not on file     Years of education: Not on file     Highest education level: Not on file   Occupational History     Not on file   Tobacco Use     Smoking status: Never     Smokeless tobacco: Never   Vaping Use     Vaping status: Not on file   Substance and Sexual Activity     Alcohol use: Never     Drug use: Never     Sexual activity: Yes     Partners: Male     Birth control/protection: None   Other Topics Concern     Parent/sibling w/ CABG, MI or angioplasty before 65F 55M? No   Social History Narrative     Not on file     Social Determinants of Health     Financial Resource Strain: Not on file   Food Insecurity: Not on file    Transportation Needs: Not on file   Physical Activity: Not on file   Stress: Not on file   Social Connections: Not on file   Intimate Partner Violence: Not on file   Housing Stability: Not on file     FHx: Reviewed, per HPI    Medications:  Current Outpatient Medications   Medication     Calcium Citrate-Vitamin D3 315-6.25 MG-MCG TABS     entecavir (BARACLUDE) 0.5 MG tablet     fluticasone-vilanterol (BREO ELLIPTA) 100-25 MCG/ACT inhaler     ibuprofen (ADVIL/MOTRIN) 600 MG tablet     mycophenolate (GENERIC EQUIVALENT) 500 MG tablet     predniSONE (DELTASONE) 1 MG tablet     No current facility-administered medications for this visit.       Allergies:     Allergies   Allergen Reactions     Iodine Hives, Rash and Other (See Comments)     Patient reports this allergy is because she is allergic to shrimp       Shellfish-Derived Products Difficulty breathing and Hives     Shrimp       Tetanus Toxoids        /74 (BP Location: Right arm, Patient Position: Sitting, Cuff Size: Adult Regular)   Pulse 95   Temp 98.9  F (37.2  C) (Oral)   Resp 24   Wt 73.9 kg (163 lb)   SpO2 94%   BMI 26.31 kg/m       Physical Exam:  Constitutional: well-developed, appearing stated age; cooperative,  Skin: no nail pitting, alopecia, rash, nodules or lesions  Eyes: nl EOM, vision, conjunctiva, sclera,   Neck: no mass, non-tender thyroid  Resp: lungs clear to auscultation, breathing comfortably on room air  CV: RRR, no murmurs, rubs or gallops, no edema  GI: soft, non-tender, non-distended  Lymph: no cervical, supraclavicular, or epitrochlear nodes  Neuro: nl cranial nerves, strength, sensation,   Psych: nl judgement, orientation, memory, affect.    MS: The TMJ, neck, shoulder, elbow, wrist, MCP/PIP/DIP, spine, hip, knee, ankle, and MTP/IP joints were examined and found normal. No active synovitis or altered joint anatomy. Full joint ROM. Normal  strength. No dactylitis,  tenosynovitis, enthesopathy.    Lab/Imaging: Reviewed  recent labs and imaging.     Assessment/Plan:     1. ILD and polymyositis:    Angelica Jett is a 52 year old female with history with hx of polymyositis with ILD presents to establish care woth rheumatology at [facility]. The patient recently moved from [place] where she was following rheumatologist Dr. Trupti Ruiz at the the [facility]. She was diagnosed with ILD (UIP pattern based on lung biopsy) and polymyositis in 2005. She is currently on Cellcept 1500 mg once daily - Cellcept was decreased to this given stability in her pulmonary function.She has stopped taking Prednisone since February 2023. Patient has marked improvement in DLCO- recently followed up with pulmonary at [facility]. She is not requiring oxygen with her regular activities - this improvement could be due to both Cellcept and pulmonary rehab. She doesn't have any evidence of muscle weakness on our assessment. I couldn't find myositis serology in her charts - perhaps those were done at her initial diagnosis and we don't have access to her very old charts. I plan to repeat myositis serology as it will be helpful in understanding prognosis. it will also guide for therapeutic in case she develops flaring of the condition in future. We plan to continue current dose of Cellcept as per pulmonary needs since she doesn't have any evidence of myositis other rheumatologic manifestation.      2. Chronic Hepatitis B on Entecavir - patient has referral to see hepatology     3. Hx of secondary PAH- previously on Revatio and Adcirc - Discontinued in 2019 based on improvement in her right heart cath in [place]     4. Osteoporosis secondary to steroid use. Now off of steroids. Continue Ca and vit D. She will need repeat DEXA scan.     5. Check myositis panel and BRITTNEY     Follow up: in 6  Months with Dr. Nabil MD      The patient was seen and staffed with Dr. Nabil MD.       Kingston Ramirez MD  Rheumatology Fellow  Pager [phone]        Attending Note: I  saw and evaluated the patient with Dr. Ramirez. I agree with the assessment and plan.    Kimberley Ferrer MD

## 2023-04-12 ENCOUNTER — PRE VISIT (OUTPATIENT)
Dept: RHEUMATOLOGY | Facility: CLINIC | Age: 53
End: 2023-04-12
Payer: COMMERCIAL

## 2023-04-12 ENCOUNTER — OFFICE VISIT (OUTPATIENT)
Dept: FAMILY MEDICINE | Facility: CLINIC | Age: 53
End: 2023-04-12
Payer: COMMERCIAL

## 2023-04-12 ENCOUNTER — LAB (OUTPATIENT)
Dept: LAB | Facility: CLINIC | Age: 53
End: 2023-04-12
Payer: COMMERCIAL

## 2023-04-12 ENCOUNTER — OFFICE VISIT (OUTPATIENT)
Dept: RHEUMATOLOGY | Facility: CLINIC | Age: 53
End: 2023-04-12
Attending: INTERNAL MEDICINE
Payer: COMMERCIAL

## 2023-04-12 VITALS
DIASTOLIC BLOOD PRESSURE: 68 MMHG | HEART RATE: 88 BPM | BODY MASS INDEX: 25.81 KG/M2 | OXYGEN SATURATION: 96 % | RESPIRATION RATE: 22 BRPM | HEIGHT: 66 IN | SYSTOLIC BLOOD PRESSURE: 104 MMHG | WEIGHT: 160.6 LBS | TEMPERATURE: 98.5 F

## 2023-04-12 VITALS
TEMPERATURE: 98.9 F | HEART RATE: 95 BPM | WEIGHT: 163 LBS | SYSTOLIC BLOOD PRESSURE: 121 MMHG | DIASTOLIC BLOOD PRESSURE: 74 MMHG | RESPIRATION RATE: 24 BRPM | BODY MASS INDEX: 26.31 KG/M2 | OXYGEN SATURATION: 94 %

## 2023-04-12 DIAGNOSIS — J84.9 ILD (INTERSTITIAL LUNG DISEASE) (H): ICD-10-CM

## 2023-04-12 DIAGNOSIS — L02.91 ABSCESS: ICD-10-CM

## 2023-04-12 DIAGNOSIS — M60.10: ICD-10-CM

## 2023-04-12 DIAGNOSIS — B18.1 CHRONIC VIRAL HEPATITIS B WITHOUT DELTA AGENT AND WITHOUT COMA (H): ICD-10-CM

## 2023-04-12 DIAGNOSIS — B19.10 HEPATITIS B VIRUS INFECTION, UNSPECIFIED CHRONICITY: ICD-10-CM

## 2023-04-12 DIAGNOSIS — Z11.59 ENCOUNTER FOR SCREENING FOR OTHER VIRAL DISEASES: ICD-10-CM

## 2023-04-12 DIAGNOSIS — Z11.4 SCREENING FOR HIV (HUMAN IMMUNODEFICIENCY VIRUS): Primary | ICD-10-CM

## 2023-04-12 DIAGNOSIS — L73.2 HIDRADENITIS AXILLARIS: Primary | ICD-10-CM

## 2023-04-12 DIAGNOSIS — I27.20 PULMONARY HYPERTENSION (H): ICD-10-CM

## 2023-04-12 LAB
ALBUMIN SERPL BCG-MCNC: 4.3 G/DL (ref 3.5–5.2)
ALP SERPL-CCNC: 97 U/L (ref 35–104)
ALT SERPL W P-5'-P-CCNC: 19 U/L (ref 10–35)
ANION GAP SERPL CALCULATED.3IONS-SCNC: 9 MMOL/L (ref 7–15)
AST SERPL W P-5'-P-CCNC: 26 U/L (ref 10–35)
BILIRUB DIRECT SERPL-MCNC: <0.2 MG/DL (ref 0–0.3)
BILIRUB SERPL-MCNC: 0.2 MG/DL
BUN SERPL-MCNC: 10 MG/DL (ref 6–20)
CALCIUM SERPL-MCNC: 9.8 MG/DL (ref 8.6–10)
CHLORIDE SERPL-SCNC: 105 MMOL/L (ref 98–107)
CHOLEST SERPL-MCNC: 142 MG/DL
CREAT SERPL-MCNC: 0.79 MG/DL (ref 0.51–0.95)
DEPRECATED HCO3 PLAS-SCNC: 28 MMOL/L (ref 22–29)
ERYTHROCYTE [DISTWIDTH] IN BLOOD BY AUTOMATED COUNT: 14.7 % (ref 10–15)
GFR SERPL CREATININE-BSD FRML MDRD: 89 ML/MIN/1.73M2
GLUCOSE SERPL-MCNC: 95 MG/DL (ref 70–99)
HCT VFR BLD AUTO: 44.8 % (ref 35–47)
HCV AB SERPL QL IA: NONREACTIVE
HDLC SERPL-MCNC: 51 MG/DL
HGB BLD-MCNC: 13.4 G/DL (ref 11.7–15.7)
HIV 1+2 AB+HIV1 P24 AG SERPL QL IA: NONREACTIVE
INR PPP: 0.91 (ref 0.85–1.15)
LDLC SERPL CALC-MCNC: 73 MG/DL
MCH RBC QN AUTO: 24 PG (ref 26.5–33)
MCHC RBC AUTO-ENTMCNC: 29.9 G/DL (ref 31.5–36.5)
MCV RBC AUTO: 80 FL (ref 78–100)
NONHDLC SERPL-MCNC: 91 MG/DL
PLATELET # BLD AUTO: 219 10E3/UL (ref 150–450)
POTASSIUM SERPL-SCNC: 4.3 MMOL/L (ref 3.4–5.3)
PROT SERPL-MCNC: 8.7 G/DL (ref 6.4–8.3)
RBC # BLD AUTO: 5.59 10E6/UL (ref 3.8–5.2)
SODIUM SERPL-SCNC: 142 MMOL/L (ref 136–145)
TRIGL SERPL-MCNC: 91 MG/DL
WBC # BLD AUTO: 8.8 10E3/UL (ref 4–11)

## 2023-04-12 PROCEDURE — 86235 NUCLEAR ANTIGEN ANTIBODY: CPT | Mod: 90 | Performed by: PATHOLOGY

## 2023-04-12 PROCEDURE — 87389 HIV-1 AG W/HIV-1&-2 AB AG IA: CPT | Mod: 90 | Performed by: PATHOLOGY

## 2023-04-12 PROCEDURE — 99204 OFFICE O/P NEW MOD 45 MIN: CPT | Mod: GC | Performed by: STUDENT IN AN ORGANIZED HEALTH CARE EDUCATION/TRAINING PROGRAM

## 2023-04-12 PROCEDURE — 80053 COMPREHEN METABOLIC PANEL: CPT | Performed by: PATHOLOGY

## 2023-04-12 PROCEDURE — 99000 SPECIMEN HANDLING OFFICE-LAB: CPT | Performed by: PATHOLOGY

## 2023-04-12 PROCEDURE — 85027 COMPLETE CBC AUTOMATED: CPT | Performed by: PATHOLOGY

## 2023-04-12 PROCEDURE — 86803 HEPATITIS C AB TEST: CPT | Mod: 90 | Performed by: PATHOLOGY

## 2023-04-12 PROCEDURE — G0463 HOSPITAL OUTPT CLINIC VISIT: HCPCS | Performed by: STUDENT IN AN ORGANIZED HEALTH CARE EDUCATION/TRAINING PROGRAM

## 2023-04-12 PROCEDURE — 83516 IMMUNOASSAY NONANTIBODY: CPT | Mod: 90 | Performed by: PATHOLOGY

## 2023-04-12 PROCEDURE — 82248 BILIRUBIN DIRECT: CPT | Performed by: PATHOLOGY

## 2023-04-12 PROCEDURE — 85610 PROTHROMBIN TIME: CPT | Performed by: PATHOLOGY

## 2023-04-12 PROCEDURE — 80061 LIPID PANEL: CPT | Performed by: PATHOLOGY

## 2023-04-12 PROCEDURE — 87517 HEPATITIS B DNA QUANT: CPT | Mod: 90 | Performed by: PATHOLOGY

## 2023-04-12 PROCEDURE — 36415 COLL VENOUS BLD VENIPUNCTURE: CPT | Performed by: PATHOLOGY

## 2023-04-12 PROCEDURE — 99214 OFFICE O/P EST MOD 30 MIN: CPT | Performed by: FAMILY MEDICINE

## 2023-04-12 RX ORDER — TETRACYCLINE HYDROCHLORIDE 500 MG/1
500 CAPSULE ORAL 2 TIMES DAILY
Qty: 20 CAPSULE | Refills: 0 | Status: SHIPPED | OUTPATIENT
Start: 2023-04-12 | End: 2023-05-31

## 2023-04-12 ASSESSMENT — PAIN SCALES - GENERAL
PAINLEVEL: SEVERE PAIN (6)
PAINLEVEL: NO PAIN (0)

## 2023-04-12 NOTE — NURSING NOTE
Chief Complaint   Patient presents with     Consult     /74 (BP Location: Right arm, Patient Position: Sitting, Cuff Size: Adult Regular)   Pulse 95   Temp 98.9  F (37.2  C) (Oral)   Resp 24   Wt 73.9 kg (163 lb)   SpO2 94%   BMI 26.31 kg/m      Mila ROGERS MA

## 2023-04-12 NOTE — LETTER
4/12/2023       RE: Angelica Jett  [address]  [address]  [place] [address]     Dear Colleague,    Thank you for referring your patient, Angelica Jett, to the [facility] RHEUMATOLOG[facility] [place] [facility]. Please see a copy of my visit note below.    Rheumatology Clinic Initial Consult  04/11/23    Reason for visit: ILD/polymyositis     HPI:    Angelica Jett is a 52 year old female with a history of  chronic hepatitis B (on Entrcavir), GERD, glucocorticoid induced osteoporosis, polymyositis with interstitial lung disease with irritable bowel syndrome who presents to rheumatology to establish care. [place] Patient was initially diagnosed with polymyositis and interstitial lung disease in 2005, after presenting with severe bilateral upper and lower extremity proximal muscle weakness. She reports first noticing shortness of breath after giving birth to her baby in 2003 with weakness noted in 2004 after she noticed she could no longer lift her son. She denies presence of rashes or mechanics hands on presentation or later through out the disease course. She had undergone MRI muscles but denies ever having a muscle biopsy. She was initially under the care of Dr. Alan at [facility] in [place] and later at [facility]. Patient reports initially starting treatment with CellCept 1000 mg twice daily and prednisone 60 mg daily. Since diagnosis in 2005 she was able to taper down her prednisone successfully and was able to stop prednisone 1 mg in February 2023. She recalls one flare of polymyositis on prednisone taper in 2009 but didn't experience any additional flare on continued taper. Patient is currently doing well on Cellcept 1500 mg  Daily. Cellcept dose was decreased given stability in PFTs. She denies any issues with muscle weakness. She doesn't feel shortness of breath  on regular activity but experiences dyspnea on exertional activity. She sometimes uses supplemental oxygen on exertional activity. Patient was listed on transplant list but her condition stabilized with immunosuppressive therapy. Apparently she has also been receiving pulmonary rehab with improvement in her symptoms. Patient has hx of chronic hepatitis B on Entecavir - she has negative Hep B e antigen with positive hep B e antibody and low titer HBV quant < 10/ ml. She has scheduled to follow up with hepatology. She has normal renal function. Patient has hx osteoporosis secondary to glucocorticoid use. She was previously on Reclast x 3 yrs but off since 2016. Patient reports intermittent hx of paresthesias in her feet. She also had diagnosis of pulmonary hypertension in 2012 and required Adcirca and revatio that was apparently discontinued given improvement in pulmonary pressures on right heart cath in 2019.  She is tolerating Cellcept well. She also has hx of osteoporosis secondary to glucocorticoid us. She was previously on n Reclast for 3 years but has been off all therapy since about 2016. She continues to take ca and vitamin D. Last DEXA ~5 yrs ago. She hasn't had repeat Dexa scan. She was previously working as . Denies family hx of autoimmune conditions, no smoking or drinking hx in past.     Rest of ROS is negative except in HPI       Past Medical History  No past medical history on file.  Past Surgical History:   Procedure Laterality Date    BIOPSY  2005    COLONOSCOPY  05/28/2021     Social History     Socioeconomic History    Marital status:      Spouse name: Not on file    Number of children: Not on file    Years of education: Not on file    Highest education level: Not on file   Occupational History    Not on file   Tobacco Use    Smoking status: Never    Smokeless tobacco: Never   Vaping Use    Vaping status: Not on file   Substance and Sexual Activity    Alcohol use: Never     Drug use: Never    Sexual activity: Yes     Partners: Male     Birth control/protection: None   Other Topics Concern    Parent/sibling w/ CABG, MI or angioplasty before 65F 55M? No   Social History Narrative    Not on file     Social Determinants of Health     Financial Resource Strain: Not on file   Food Insecurity: Not on file   Transportation Needs: Not on file   Physical Activity: Not on file   Stress: Not on file   Social Connections: Not on file   Intimate Partner Violence: Not on file   Housing Stability: Not on file     No family history on file.    Medications:  Current Outpatient Medications   Medication    Calcium Citrate-Vitamin D3 315-6.25 MG-MCG TABS    entecavir (BARACLUDE) 0.5 MG tablet    fluticasone-vilanterol (BREO ELLIPTA) 100-25 MCG/ACT inhaler    ibuprofen (ADVIL/MOTRIN) 600 MG tablet    mycophenolate (GENERIC EQUIVALENT) 500 MG tablet    predniSONE (DELTASONE) 1 MG tablet     No current facility-administered medications for this visit.       Allergies:     Allergies   Allergen Reactions    Iodine Hives, Rash and Other (See Comments)     Patient reports this allergy is because she is allergic to shrimp      Shellfish-Derived Products Difficulty breathing and Hives     Shrimp      Tetanus Toxoids        /74 (BP Location: Right arm, Patient Position: Sitting, Cuff Size: Adult Regular)   Pulse 95   Temp 98.9  F (37.2  C) (Oral)   Resp 24   Wt 73.9 kg (163 lb)   SpO2 94%   BMI 26.31 kg/m       Physical Exam:  Constitutional: well-developed, appearing stated age; cooperative,  Skin: no nail pitting, alopecia, rash, nodules or lesions  Eyes: nl EOM, vision, conjunctiva, sclera,   Neck: no mass, non-tender thyroid  Resp: lungs clear to auscultation, breathing comfortably on room air  CV: RRR, no murmurs, rubs or gallops, no edema  GI: soft, non-tender, non-distended  Lymph: no cervical, supraclavicular, or epitrochlear nodes  Neuro: nl cranial nerves, strength, sensation,   Psych: nl  judgement, orientation, memory, affect.    MS: The TMJ, neck, shoulder, elbow, wrist, MCP/PIP/DIP, spine, hip, knee, ankle, and MTP/IP joints were examined and found normal. No active synovitis or altered joint anatomy. Full joint ROM. Normal  strength. No dactylitis,  tenosynovitis, enthesopathy.    Lab/Imaging: Reviewed recent labs and imaging.     Assessment/Plan:     1. ILD and polymyositis:    Angelica Jett is a 52 year old female with history with hx of polymyositis with ILD presents to establish care woth rheumatology at [facility]. The patient recently moved from [place] where she was following rheumatologist Dr. Trupti Ruiz at the the [facility]. She was diagnosed with ILD (UIP pattern based on lung biopsy) and polymyositis in 2005. She is currently on Cellcept 1500 mg once daily - Cellcept was decreased to this given stability in her pulmonary function.She has stopped taking Prednisone since February 2023. Patient has marked improvement in DLCO- recently followed up with pulmonary at [facility]. She is not requiring oxygen with her regular activities - this improvement could be due to both Cellcept and pulmonary rehab. She doesn't have any evidence of muscle weakness on our assessment. I couldn't find myositis serology in her charts - perhaps those were done at her initial diagnosis and we don't have access to her very old charts. I plan to repeat myositis serology as it will be helpful in understanding prognosis. it will also guide for therapeutic in case she develops flaring of the condition in future. We plan to continue current dose of Cellcept as per pulmonary needs since she doesn't have any evidence of myositis other rheumatologic manifestation.      2. Chronic Hepatitis B on Entecavir - patient has referral to see hepatology     3. Hx of secondary PAH- previously on Revatio and Adcirc - Discontinued in 2019 based on improvement in her right heart cath in [place]     4. Osteoporosis  secondary to steroid use. Now off of steroids. Continue Ca and vit D. She will need repeat DEXA scan.     5. Check myositis panel and BRITTNEY     Follow up: in 6  Months with Dr. Nabil MD      The patient was seen and staffed with Dr. Nabil MD.       Kingston Ramirez MD  Rheumatology Fellow  Pager [phone]

## 2023-04-12 NOTE — PROGRESS NOTES
Assessment & Plan     Hidradenitis axillaris  Plan: start antibiotics for 10 days   - tetracycline (ACHROMYCIN/SUMYCIN) 500 MG capsule; Take 1 capsule (500 mg) by mouth 2 times daily    Abscess  The axillary abscess is draining on its own.  Keep area clean.  Antibiotics as above.  Re-evaluate in 2-3 weeks,  Earlier if pus or swelling more, with or without  draining      Past medical history is significant for Chronic HEPATITIS B VIRUS  (on entcavir), gastroesophageal reflux disease, polymyositis with ILD(followed by [facility] pulmonology), irritable bowel syndrome, glucocorticoid induced osteoporosis (off steroid).Seen by rheumatology- 4/11/23  No medications changes.  Follow up with Dr Ferrer in 6 month  - considered this problem when making today's plans  - no interventions today       -followed by specialist.    Prescription drug management  I spent a total of 34 minutes on the day of the visit.   Time spent by me doing chart review, history and exam, documentation and further activities per the note         BMI:       Estefani Tate MD  [facility]    Ana Luisa Dominguez is a 52 year old, presenting for the following health issues:  Mass        4/12/2023     1:18 PM   Additional Questions   Roomed by Angelica Koo   Accompanied by Self     HPI     Concern - Boil near Left armpit   Onset: 2-3 weeks ago   Description: Patient states that orginally there were 2 Both opened but one dried out   The other now looks like a wound   There is also a corn(?) on the right middle toe  - Pain 6/10   Had gone to the Podiatrist in the past but now it came back   Intensity: mild  Progression of Symptoms:  improving  Accompanying Signs & Symptoms: Pain and   Previous history of similar problem:   Precipitating factors:        Worsened by: None  Alleviating factors:        Improved by: None   Therapies tried and outcome: Using Neosporin and bangadge to prevent from getting infected     Past medical history  "is significant for Chronic HEPATITIS B VIRUS  (on entcavir), gastroesophageal reflux disease, polymyositis with ILD(followed by [facility] pulmonology), irritable bowel syndrome, glucocorticoid induced osteoporosis (off steroid).Seen by rheumatology- 4/11/23  No medications changes.  Follow up with Dr Ferrer in 6 month        Review of Systems   Constitutional, HEENT, cardiovascular, pulmonary, GI, , musculoskeletal, neuro, skin, endocrine and psych systems are negative, except as otherwise noted.      Objective    /68   Pulse 88   Temp 98.5  F (36.9  C) (Temporal)   Resp 22   Ht 1.685 m (5' 6.34\")   Wt 72.8 kg (160 lb 9.6 oz)   SpO2 96%   BMI 25.66 kg/m    Body mass index is 25.66 kg/m .  Physical Exam   GENERAL: healthy, alert and no distress  Left axilla abscess with open drainage. And on pressure with gloved fingers - scanty puss was expressed on gauze.  She has hyperpigmented papules with some scarring in both axillary region  NECK: no adenopathy, no asymmetry, masses, or scars and thyroid normal to palpation  RESP: lungs clear to auscultation - no rales, rhonchi or wheezes  CV: regular rate and rhythm, normal S1 S2, no S3 or S4, no murmur, click or rub, no peripheral edema and peripheral pulses strong  ABDOMEN: soft, nontender, no hepatosplenomegaly, no masses and bowel sounds normal  MS: no gross musculoskeletal defects noted, no edema                "

## 2023-04-12 NOTE — PATIENT INSTRUCTIONS
1. Hidradenitis axillaris    - tetracycline (ACHROMYCIN/SUMYCIN) 500 MG capsule; Take 1 capsule (500 mg) by mouth 2 times daily  Dispense: 20 capsule; Refill: 0    2. Abscess  Its already draining.  Keep wound covered .    Avoid tightly fitted clothng

## 2023-04-12 NOTE — PROGRESS NOTES
{PROVIDER CHARTING PREFERENCE:026968}    Ana Luisa Dominguez is a 52 year old, presenting for the following health issues:  No chief complaint on file.         View : No data to display.              HPI     {SUPERLIST (Optional):956699}  {additonal problems for provider to add (Optional):146956}      Review of Systems   {ROS COMP (Optional):093969}      Objective    There were no vitals taken for this visit.  There is no height or weight on file to calculate BMI.  Physical Exam   {Exam List (Optional):163291}    {Diagnostic Test Results (Optional):355559}    {AMBULATORY ATTESTATION (Optional):795490}

## 2023-04-13 LAB
ENA SM IGG SER IA-ACNC: <0.7 U/ML
ENA SM IGG SER IA-ACNC: NEGATIVE
ENA SS-A AB SER IA-ACNC: 1 U/ML
ENA SS-A AB SER IA-ACNC: NEGATIVE
ENA SS-B IGG SER IA-ACNC: <0.6 U/ML
ENA SS-B IGG SER IA-ACNC: NEGATIVE
HBV DNA SERPL NAA+PROBE-ACNC: NOT DETECTED IU/ML
U1 SNRNP IGG SER IA-ACNC: 2.7 U/ML
U1 SNRNP IGG SER IA-ACNC: NEGATIVE

## 2023-04-18 LAB
ANA SER QL: NEGATIVE
ANNOTATION COMMENT IMP: NORMAL
EJ AB SER QL: NEGATIVE
ENA JO1 IGG SER-ACNC: 5 AU/ML
MDA5 AB SER QL LINE BLOT: NEGATIVE
MI2 AB SER QL: NEGATIVE
MJ AB SER QL LINE BLOT: NEGATIVE
OJ AB SER QL: NEGATIVE
PL12 AB SER QL: NEGATIVE
PL7 AB SER QL: NEGATIVE
SAE1 AB SER QL LINE BLOT: NEGATIVE
SRP AB SERPL QL: NEGATIVE
TIF1-GAMMA AB SER QL LINE BLOT: NEGATIVE

## 2023-04-23 PROBLEM — L73.2 HIDRADENITIS AXILLARIS: Status: ACTIVE | Noted: 2023-04-23

## 2023-04-23 PROBLEM — J84.9 ILD (INTERSTITIAL LUNG DISEASE) (H): Status: ACTIVE | Noted: 2023-04-23

## 2023-04-23 PROBLEM — B19.10 HEPATITIS B VIRUS INFECTION, UNSPECIFIED CHRONICITY: Status: ACTIVE | Noted: 2023-04-23

## 2023-04-23 PROBLEM — L02.91 ABSCESS: Status: ACTIVE | Noted: 2023-04-23

## 2023-04-25 ENCOUNTER — HOSPITAL ENCOUNTER (OUTPATIENT)
Dept: CARDIAC REHAB | Facility: CLINIC | Age: 53
Discharge: HOME | End: 2023-04-25
Attending: INTERNAL MEDICINE
Payer: COMMERCIAL

## 2023-04-25 PROCEDURE — G0238 OTH RESP PROC, INDIV: HCPCS

## 2023-04-27 ENCOUNTER — HOSPITAL ENCOUNTER (OUTPATIENT)
Dept: CARDIAC REHAB | Facility: CLINIC | Age: 53
Discharge: HOME | End: 2023-04-27
Attending: INTERNAL MEDICINE
Payer: COMMERCIAL

## 2023-04-27 ENCOUNTER — MYC MEDICAL ADVICE (OUTPATIENT)
Dept: PULMONOLOGY | Facility: CLINIC | Age: 53
End: 2023-04-27
Payer: COMMERCIAL

## 2023-04-27 DIAGNOSIS — J84.9 ILD (INTERSTITIAL LUNG DISEASE) (H): Primary | ICD-10-CM

## 2023-04-27 PROCEDURE — G0239 OTH RESP PROC, GROUP: HCPCS

## 2023-04-27 RX ORDER — FLUTICASONE FUROATE AND VILANTEROL 100; 25 UG/1; UG/1
1 POWDER RESPIRATORY (INHALATION) DAILY
Qty: 1 EACH | Refills: 3 | Status: SHIPPED | OUTPATIENT
Start: 2023-04-27 | End: 2023-08-22

## 2023-05-02 ENCOUNTER — HOSPITAL ENCOUNTER (OUTPATIENT)
Dept: CARDIAC REHAB | Facility: CLINIC | Age: 53
Discharge: HOME | End: 2023-05-02
Attending: INTERNAL MEDICINE
Payer: COMMERCIAL

## 2023-05-02 PROCEDURE — G0239 OTH RESP PROC, GROUP: HCPCS | Performed by: REHABILITATION PRACTITIONER

## 2023-05-04 ENCOUNTER — HOSPITAL ENCOUNTER (OUTPATIENT)
Dept: CARDIAC REHAB | Facility: CLINIC | Age: 53
Discharge: HOME | End: 2023-05-04
Attending: INTERNAL MEDICINE
Payer: COMMERCIAL

## 2023-05-04 PROCEDURE — G0239 OTH RESP PROC, GROUP: HCPCS

## 2023-05-09 ENCOUNTER — HOSPITAL ENCOUNTER (OUTPATIENT)
Dept: CARDIAC REHAB | Facility: CLINIC | Age: 53
Discharge: HOME | End: 2023-05-09
Attending: INTERNAL MEDICINE
Payer: COMMERCIAL

## 2023-05-09 PROCEDURE — G0239 OTH RESP PROC, GROUP: HCPCS

## 2023-05-16 ENCOUNTER — DOCUMENTATION ONLY (OUTPATIENT)
Dept: LAB | Facility: CLINIC | Age: 53
End: 2023-05-16
Payer: COMMERCIAL

## 2023-05-16 ENCOUNTER — HOSPITAL ENCOUNTER (OUTPATIENT)
Dept: CARDIAC REHAB | Facility: CLINIC | Age: 53
Discharge: HOME | End: 2023-05-16
Attending: INTERNAL MEDICINE
Payer: COMMERCIAL

## 2023-05-16 DIAGNOSIS — B18.1 CHRONIC VIRAL HEPATITIS B WITHOUT DELTA AGENT AND WITHOUT COMA (H): Primary | ICD-10-CM

## 2023-05-16 PROCEDURE — G0239 OTH RESP PROC, GROUP: HCPCS

## 2023-05-16 NOTE — PROGRESS NOTES
Hello,   In order to offer our patients the best possible experience, the lab schedule is reviewed to ensure patients have lab orders in Epic prior to arriving at the lab.    Please have one of your team members place a lab order in Epic for this patient prior to the lab appointment date.     Thank you for your attention,   [facility] [phone]

## 2023-05-17 ENCOUNTER — LAB (OUTPATIENT)
Dept: LAB | Facility: CLINIC | Age: 53
End: 2023-05-17
Payer: COMMERCIAL

## 2023-05-17 ENCOUNTER — MYC MEDICAL ADVICE (OUTPATIENT)
Dept: FAMILY MEDICINE | Facility: CLINIC | Age: 53
End: 2023-05-17

## 2023-05-17 ENCOUNTER — OFFICE VISIT (OUTPATIENT)
Dept: GASTROENTEROLOGY | Facility: CLINIC | Age: 53
End: 2023-05-17
Attending: INTERNAL MEDICINE
Payer: COMMERCIAL

## 2023-05-17 VITALS
WEIGHT: 162.2 LBS | DIASTOLIC BLOOD PRESSURE: 83 MMHG | SYSTOLIC BLOOD PRESSURE: 124 MMHG | OXYGEN SATURATION: 99 % | BODY MASS INDEX: 25.91 KG/M2 | TEMPERATURE: 98 F | HEART RATE: 81 BPM

## 2023-05-17 DIAGNOSIS — B18.1 CHRONIC VIRAL HEPATITIS B WITHOUT DELTA AGENT AND WITHOUT COMA (H): ICD-10-CM

## 2023-05-17 LAB
ALBUMIN SERPL BCG-MCNC: 4 G/DL (ref 3.5–5.2)
ALP SERPL-CCNC: 91 U/L (ref 35–104)
ALT SERPL W P-5'-P-CCNC: 19 U/L (ref 10–35)
ANION GAP SERPL CALCULATED.3IONS-SCNC: 8 MMOL/L (ref 7–15)
AST SERPL W P-5'-P-CCNC: 25 U/L (ref 10–35)
BILIRUB DIRECT SERPL-MCNC: <0.2 MG/DL (ref 0–0.3)
BILIRUB SERPL-MCNC: 0.2 MG/DL
BUN SERPL-MCNC: 13.7 MG/DL (ref 6–20)
CALCIUM SERPL-MCNC: 9.6 MG/DL (ref 8.6–10)
CHLORIDE SERPL-SCNC: 105 MMOL/L (ref 98–107)
CREAT SERPL-MCNC: 0.89 MG/DL (ref 0.51–0.95)
DEPRECATED HCO3 PLAS-SCNC: 28 MMOL/L (ref 22–29)
ERYTHROCYTE [DISTWIDTH] IN BLOOD BY AUTOMATED COUNT: 14.5 % (ref 10–15)
GFR SERPL CREATININE-BSD FRML MDRD: 77 ML/MIN/1.73M2
GLUCOSE SERPL-MCNC: 95 MG/DL (ref 70–99)
HCT VFR BLD AUTO: 43.3 % (ref 35–47)
HGB BLD-MCNC: 13.6 G/DL (ref 11.7–15.7)
INR PPP: 0.95 (ref 0.85–1.15)
MCH RBC QN AUTO: 24.5 PG (ref 26.5–33)
MCHC RBC AUTO-ENTMCNC: 31.4 G/DL (ref 31.5–36.5)
MCV RBC AUTO: 78 FL (ref 78–100)
PLATELET # BLD AUTO: 206 10E3/UL (ref 150–450)
POTASSIUM SERPL-SCNC: 3.9 MMOL/L (ref 3.4–5.3)
PROT SERPL-MCNC: 8.1 G/DL (ref 6.4–8.3)
RBC # BLD AUTO: 5.54 10E6/UL (ref 3.8–5.2)
SODIUM SERPL-SCNC: 141 MMOL/L (ref 136–145)
WBC # BLD AUTO: 8.5 10E3/UL (ref 4–11)

## 2023-05-17 PROCEDURE — 82248 BILIRUBIN DIRECT: CPT | Performed by: PATHOLOGY

## 2023-05-17 PROCEDURE — 80053 COMPREHEN METABOLIC PANEL: CPT | Performed by: PATHOLOGY

## 2023-05-17 PROCEDURE — 99204 OFFICE O/P NEW MOD 45 MIN: CPT | Mod: GC | Performed by: STUDENT IN AN ORGANIZED HEALTH CARE EDUCATION/TRAINING PROGRAM

## 2023-05-17 PROCEDURE — G0463 HOSPITAL OUTPT CLINIC VISIT: HCPCS | Performed by: STUDENT IN AN ORGANIZED HEALTH CARE EDUCATION/TRAINING PROGRAM

## 2023-05-17 PROCEDURE — 36415 COLL VENOUS BLD VENIPUNCTURE: CPT | Performed by: PATHOLOGY

## 2023-05-17 PROCEDURE — 85027 COMPLETE CBC AUTOMATED: CPT | Performed by: PATHOLOGY

## 2023-05-17 PROCEDURE — 85610 PROTHROMBIN TIME: CPT | Performed by: PATHOLOGY

## 2023-05-17 RX ORDER — VITAMIN B COMPLEX
TABLET ORAL DAILY
COMMUNITY

## 2023-05-17 ASSESSMENT — PAIN SCALES - GENERAL: PAINLEVEL: NO PAIN (0)

## 2023-05-17 NOTE — PROGRESS NOTES
[facility] Hepatology    New Patient Visit    Referring provider:  Garrison Bustillo        Chief complaint: HBV infection.      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       Briefly, patient states that she was first diagnosed with Hepatitis B ~ [id number] at time of ILD/Polymyositis diagnosis which also was around time where she gave birth to her son.  She remembers that her Liver Tests were really high but is unsure of how high her HBV DNA level was. She was subsequently initiated on antiviral tx.     Per Documentation found in EHR scanned from [facility]( Dr. Yen Knox) where she was receiving her care since diagnosis, patients HepB course was somewhat complicated. She was diagnosed in 2005 and Liver Biopsy at the time revealed severe activity with moderate fibrosis. She was initially tx on Adefovir 10mg Daily with subsequent normalization of her liver enzymes. Per documentation, there was a period of Eyzv-wm-Ilxsbr-up (4227-1429). In 2014, her HBV DNA PCR was 68,854,000 international unit(s)/ml; she was subsequently transitioned to Entecavir 0.5mg Daily. Since initiation on Entecavir, patient remained mostly adherent to Entecavir except for 3-month period in 2020; Liver enzymes in 6/2020 with AST 36, ALT 37, ALP 78, TBili 0.3; INR 1.0; Plt 155; HBV . Entecavir was subsequently resumed. US Abdomen 4/2021 without focal lesions concerning for HCC.     Interval Labs today  notable for normal liver biochemistries, CBC, INR. HBV VL undetectable on 4/2023.     Today, patient reports no jaundice, abdominal distension, lower extremity edema, lethargy or confusion. She reports no melena, hematemesis or hematochezia. She reports no fevers, sweats, chills or weight loss.    Medical hx Surgical hx   No past medical  history on file.   Past Surgical History:   Procedure Laterality Date     BIOPSY  2005     COLONOSCOPY  05/28/2021          Medications  Current Outpatient Medications   Medication Sig Dispense Refill     Calcium Citrate-Vitamin D3 315-6.25 MG-MCG TABS Take 2 tablets by mouth       entecavir (BARACLUDE) 0.5 MG tablet        fluticasone-vilanterol (BREO ELLIPTA) 100-25 MCG/ACT inhaler Inhale 1 puff into the lungs daily 1 each 3     ibuprofen (ADVIL/MOTRIN) 600 MG tablet Take 1 tablet by mouth 4 times daily as needed       mycophenolate (GENERIC EQUIVALENT) 500 MG tablet Take 1,500 mg by mouth       Vitamin D3 (VITAMIN D, CHOLECALCIFEROL,) 25 mcg (1000 units) tablet Take by mouth daily       tetracycline (ACHROMYCIN/SUMYCIN) 500 MG capsule Take 1 capsule (500 mg) by mouth 2 times daily (Patient not taking: Reported on 5/17/2023) 20 capsule 0       Allergies  Allergies   Allergen Reactions     Iodine Hives, Rash and Other (See Comments)     Patient reports this allergy is because she is allergic to shrimp       Shellfish-Derived Products Difficulty breathing and Hives     Shrimp       Tetanus Toxoids        Family hx Social hx   No family history on file.   Social History     Tobacco Use     Smoking status: Never     Smokeless tobacco: Never   Vaping Use     Vaping status: Never Used   Substance Use Topics     Alcohol use: Never     Drug use: Never          Review of systems  A 10-point review of systems was negative.    Examination  /83   Pulse 81   Temp 98  F (36.7  C) (Oral)   Wt 73.6 kg (162 lb 3.2 oz)   SpO2 99%   BMI 25.91 kg/m    Body mass index is 25.91 kg/m .    Gen- well, NAD, A+Ox3, normal color  Eye- EOMI  ENT- MMM, normal oropharynx  Lym- no palpable lymphadenopathy  CVS- S1, S2 normal, no added sounds, RRR  RS- CTA  Abd-ND/NT   Extr- pulses good, no YANIRA  MS- hands normal- no clubbing  Neuro- A+Ox3, no asterixis  Skin- no rash or jaundice  Psych- normal mood    Laboratory  Lab Results    Component Value Date     05/17/2023    POTASSIUM 3.9 05/17/2023    CHLORIDE 105 05/17/2023    CO2 28 05/17/2023    BUN 13.7 05/17/2023    CR 0.89 05/17/2023       Lab Results   Component Value Date    BILITOTAL 0.2 05/17/2023    ALT 19 05/17/2023    AST 25 05/17/2023    ALKPHOS 91 05/17/2023       Lab Results   Component Value Date    ALBUMIN 4.0 05/17/2023    PROTTOTAL 8.1 05/17/2023        Lab Results   Component Value Date    WBC 8.5 05/17/2023    HGB 13.6 05/17/2023    MCV 78 05/17/2023     05/17/2023       Lab Results   Component Value Date    INR 0.95 05/17/2023         Radiology    Assessment  52 year old female with PMHx significant for 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     #Chronic Hepatitis B   -Patient with long-standing history of HepB without complication and no ongoing clinical evidence of underlying liver disease since initiation on Entecavir.   -Although prior histologic evidence of fibrosis at time of diagnosis, US abdomen and Liver Enzyme normalization suggests against ongoing fibrosis.       Health Maintenance:   -Patient due for CRC screening; will address during upcoming visit.  Will need to be done with anesthesia given complex pulmonary hx.     Plan  -Hepatic Function Panel, CBC, INR, and HBV VL in 6-months   -US Abdomen for today and then again in 6-weeks.   -Continue with Entecavir; given risk for resistance and IS patient, Liver labs will need to be closely monitored.     Vinay Lawton MD  GI Fellow     Staff: Dr. Banks     Attestation:  This patient has been seen and evaluated by me, Jered Banks.  Discussed with the house staff team or resident(s) and agree with the findings and plan in this note.

## 2023-05-17 NOTE — LETTER
5/17/2023         RE: Angelica Jett  [address]  [address]  [place] [address]        Dear Colleague,    Thank you for referring your patient, Angelica Jett, to the [facility] [place]. Please see a copy of my visit note below.    [facility] Hepatology    New Patient Visit    Referring provider:  Garrison Bustillo        Chief complaint: HBV infection.      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       Briefly, patient states that she was first diagnosed with Hepatitis B ~ [id number] at time of ILD/Polymyositis diagnosis which also was around time where she gave birth to her son.  She remembers that her Liver Tests were really high but is unsure of how high her HBV DNA level was. She was subsequently initiated on antiviral tx.     Per Documentation found in EHR scanned from [facility]( Dr. Yen Knox) where she was receiving her care since diagnosis, patients HepB course was somewhat complicated. She was diagnosed in 2005 and Liver Biopsy at the time revealed severe activity with moderate fibrosis. She was initially tx on Adefovir 10mg Daily with subsequent normalization of her liver enzymes. Per documentation, there was a period of Uojf-ym-Gvgtoa-up ([id number]). In 2014, her HBV DNA PCR was 68,854,000 international unit(s)/ml; she was subsequently transitioned to Entecavir 0.5mg Daily. Since initiation on Entecavir, patient remained mostly adherent to Entecavir except for 3-month period in 2020; Liver enzymes in 6/2020 with AST 36, ALT 37, ALP 78, TBili 0.3; INR 1.0; Plt 155; HBV . Entecavir was subsequently resumed. US Abdomen 4/2021 without focal lesions concerning for HCC.     Interval Labs today  notable for normal liver biochemistries, CBC, INR. HBV VL  undetectable on 4/2023.     Today, patient reports no jaundice, abdominal distension, lower extremity edema, lethargy or confusion. She reports no melena, hematemesis or hematochezia. She reports no fevers, sweats, chills or weight loss.    Medical hx Surgical hx   No past medical history on file.   Past Surgical History:   Procedure Laterality Date    BIOPSY  2005    COLONOSCOPY  05/28/2021          Medications  Current Outpatient Medications   Medication Sig Dispense Refill    Calcium Citrate-Vitamin D3 315-6.25 MG-MCG TABS Take 2 tablets by mouth      entecavir (BARACLUDE) 0.5 MG tablet       fluticasone-vilanterol (BREO ELLIPTA) 100-25 MCG/ACT inhaler Inhale 1 puff into the lungs daily 1 each 3    ibuprofen (ADVIL/MOTRIN) 600 MG tablet Take 1 tablet by mouth 4 times daily as needed      mycophenolate (GENERIC EQUIVALENT) 500 MG tablet Take 1,500 mg by mouth      Vitamin D3 (VITAMIN D, CHOLECALCIFEROL,) 25 mcg (1000 units) tablet Take by mouth daily      tetracycline (ACHROMYCIN/SUMYCIN) 500 MG capsule Take 1 capsule (500 mg) by mouth 2 times daily (Patient not taking: Reported on 5/17/2023) 20 capsule 0       Allergies  Allergies   Allergen Reactions    Iodine Hives, Rash and Other (See Comments)     Patient reports this allergy is because she is allergic to shrimp      Shellfish-Derived Products Difficulty breathing and Hives     Shrimp      Tetanus Toxoids        Family hx Social hx   No family history on file.   Social History     Tobacco Use    Smoking status: Never    Smokeless tobacco: Never   Vaping Use    Vaping status: Never Used   Substance Use Topics    Alcohol use: Never    Drug use: Never          Review of systems  A 10-point review of systems was negative.    Examination  /83   Pulse 81   Temp 98  F (36.7  C) (Oral)   Wt 73.6 kg (162 lb 3.2 oz)   SpO2 99%   BMI 25.91 kg/m    Body mass index is 25.91 kg/m .    Gen- well, NAD, A+Ox3, normal color  Eye- EOMI  ENT- MMM, normal  oropharynx  Lym- no palpable lymphadenopathy  CVS- S1, S2 normal, no added sounds, RRR  RS- CTA  Abd-ND/NT   Extr- pulses good, no YANIRA  MS- hands normal- no clubbing  Neuro- A+Ox3, no asterixis  Skin- no rash or jaundice  Psych- normal mood    Laboratory  Lab Results   Component Value Date     05/17/2023    POTASSIUM 3.9 05/17/2023    CHLORIDE 105 05/17/2023    CO2 28 05/17/2023    BUN 13.7 05/17/2023    CR 0.89 05/17/2023       Lab Results   Component Value Date    BILITOTAL 0.2 05/17/2023    ALT 19 05/17/2023    AST 25 05/17/2023    ALKPHOS 91 05/17/2023       Lab Results   Component Value Date    ALBUMIN 4.0 05/17/2023    PROTTOTAL 8.1 05/17/2023        Lab Results   Component Value Date    WBC 8.5 05/17/2023    HGB 13.6 05/17/2023    MCV 78 05/17/2023     05/17/2023       Lab Results   Component Value Date    INR 0.95 05/17/2023         Radiology    Assessment  52 year old female with PMHx significant for 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     #Chronic Hepatitis B   -Patient with long-standing history of HepB without complication and no ongoing clinical evidence of underlying liver disease since initiation on Entecavir.   -Although prior histologic evidence of fibrosis at time of diagnosis, US abdomen and Liver Enzyme normalization suggests against ongoing fibrosis.       Health Maintenance:   -Patient due for CRC screening; will address during upcoming visit.  Will need to be done with anesthesia given complex pulmonary hx.     Plan  -Hepatic Function Panel, CBC, INR, and HBV VL in 6-months   -US Abdomen for today and then again in 6-weeks.   -Continue with Entecavir; given risk for resistance and IS patient, Liver labs will need to be closely monitored.     Vinay Lawton MD  GI Fellow     Staff: Dr. Banks     Attestation:  This patient has been seen and evaluated  by me, Jered Banks.  Discussed with the house staff team or resident(s) and agree with the findings and plan in this note.

## 2023-05-17 NOTE — NURSING NOTE
Chief Complaint   Patient presents with     New Patient       /83   Pulse 81   Temp 98  F (36.7  C) (Oral)   Wt 73.6 kg (162 lb 3.2 oz)   SpO2 99%   BMI 25.91 kg/m      Adalberto Inman on 5/17/2023 at 8:42 AM

## 2023-05-18 ENCOUNTER — HOSPITAL ENCOUNTER (OUTPATIENT)
Dept: CARDIAC REHAB | Facility: CLINIC | Age: 53
Discharge: HOME | End: 2023-05-18
Attending: INTERNAL MEDICINE
Payer: COMMERCIAL

## 2023-05-18 ENCOUNTER — ANCILLARY PROCEDURE (OUTPATIENT)
Dept: ULTRASOUND IMAGING | Facility: CLINIC | Age: 53
End: 2023-05-18
Attending: INTERNAL MEDICINE
Payer: COMMERCIAL

## 2023-05-18 DIAGNOSIS — B18.1 CHRONIC VIRAL HEPATITIS B WITHOUT DELTA AGENT AND WITHOUT COMA (H): ICD-10-CM

## 2023-05-18 DIAGNOSIS — B18.1 CHRONIC VIRAL HEPATITIS B WITHOUT DELTA AGENT AND WITHOUT COMA (H): Primary | ICD-10-CM

## 2023-05-18 PROCEDURE — 76705 ECHO EXAM OF ABDOMEN: CPT | Performed by: RADIOLOGY

## 2023-05-18 PROCEDURE — G0239 OTH RESP PROC, GROUP: HCPCS | Performed by: REHABILITATION PRACTITIONER

## 2023-05-18 RX ORDER — ENTECAVIR 0.5 MG/1
0.5 TABLET, FILM COATED ORAL DAILY
Qty: 90 TABLET | Refills: 3 | Status: SHIPPED | OUTPATIENT
Start: 2023-05-18 | End: 2024-05-17

## 2023-05-23 ENCOUNTER — HOSPITAL ENCOUNTER (OUTPATIENT)
Dept: CARDIAC REHAB | Facility: CLINIC | Age: 53
Discharge: HOME | End: 2023-05-23
Attending: INTERNAL MEDICINE
Payer: COMMERCIAL

## 2023-05-23 PROCEDURE — G0239 OTH RESP PROC, GROUP: HCPCS

## 2023-05-25 ENCOUNTER — MYC MEDICAL ADVICE (OUTPATIENT)
Dept: RHEUMATOLOGY | Facility: CLINIC | Age: 53
End: 2023-05-25
Payer: COMMERCIAL

## 2023-05-25 ENCOUNTER — HOSPITAL ENCOUNTER (OUTPATIENT)
Dept: CARDIAC REHAB | Facility: CLINIC | Age: 53
Discharge: HOME | End: 2023-05-25
Attending: INTERNAL MEDICINE
Payer: COMMERCIAL

## 2023-05-25 DIAGNOSIS — J84.9 ILD (INTERSTITIAL LUNG DISEASE) (H): Primary | ICD-10-CM

## 2023-05-25 DIAGNOSIS — M60.10: ICD-10-CM

## 2023-05-25 PROCEDURE — G0239 OTH RESP PROC, GROUP: HCPCS

## 2023-05-25 RX ORDER — MYCOPHENOLATE MOFETIL 500 MG/1
1500 TABLET ORAL
Status: CANCELLED | OUTPATIENT
Start: 2023-05-25

## 2023-05-25 NOTE — TELEPHONE ENCOUNTER
"Medication: mycophenolate (GENERIC EQUIVALENT) 500 MG tablet       Last Written Prescription Date:  This is listed in medication list as \"Patient Reported\"  Last Fill Quantity: NA,   # refills: NA  Last Office Visit:  4/12/2023  Next Office Visit:  10/12/2023      CBC RESULTS: Recent Labs   Lab Test 05/17/23  0738   WBC 8.5   RBC 5.54*   HGB 13.6   HCT 43.3   MCV 78   MCH 24.5*   MCHC 31.4*   RDW 14.5          Creatinine   Date Value Ref Range Status   05/17/2023 0.89 0.51 - 0.95 mg/dL Final   ]    Liver Function Studies -   Recent Labs   Lab Test 05/17/23  0738   PROTTOTAL 8.1   ALBUMIN 4.0   BILITOTAL 0.2   ALKPHOS 91   AST 25   ALT 19       Routing refill request to provider for review/approval because:  Drug not on the [facility], [facility] or [facility] refill protocol or controlled substance.     This medication is listed as \"Patient Reported\" Routing to RN to clarify current dose.     Patti Cesar CMA   5/25/2023 11:00 AM        "

## 2023-05-26 RX ORDER — MYCOPHENOLATE MOFETIL 500 MG/1
1500 TABLET ORAL DAILY
Qty: 270 TABLET | Refills: 1 | Status: SHIPPED | OUTPATIENT
Start: 2023-05-26 | End: 2023-12-04

## 2023-05-30 ENCOUNTER — HOSPITAL ENCOUNTER (OUTPATIENT)
Dept: CARDIAC REHAB | Facility: CLINIC | Age: 53
Discharge: HOME | End: 2023-05-30
Attending: INTERNAL MEDICINE
Payer: COMMERCIAL

## 2023-05-30 PROCEDURE — G0239 OTH RESP PROC, GROUP: HCPCS

## 2023-05-30 PROCEDURE — G0239 OTH RESP PROC, GROUP: HCPCS | Performed by: OCCUPATIONAL THERAPIST

## 2023-05-31 ENCOUNTER — OFFICE VISIT (OUTPATIENT)
Dept: FAMILY MEDICINE | Facility: CLINIC | Age: 53
End: 2023-05-31
Payer: COMMERCIAL

## 2023-05-31 ENCOUNTER — TELEPHONE (OUTPATIENT)
Dept: WOUND CARE | Facility: CLINIC | Age: 53
End: 2023-05-31

## 2023-05-31 VITALS
SYSTOLIC BLOOD PRESSURE: 109 MMHG | OXYGEN SATURATION: 97 % | HEART RATE: 85 BPM | RESPIRATION RATE: 20 BRPM | BODY MASS INDEX: 25.58 KG/M2 | TEMPERATURE: 97.3 F | WEIGHT: 159.2 LBS | HEIGHT: 66 IN | DIASTOLIC BLOOD PRESSURE: 66 MMHG

## 2023-05-31 DIAGNOSIS — M33.20 POLYMYOSITIS (H): ICD-10-CM

## 2023-05-31 DIAGNOSIS — L73.2 HIDRADENITIS AXILLARIS: Primary | ICD-10-CM

## 2023-05-31 DIAGNOSIS — J84.9 ILD (INTERSTITIAL LUNG DISEASE) (H): ICD-10-CM

## 2023-05-31 DIAGNOSIS — T14.8XXA OPEN WOUND: ICD-10-CM

## 2023-05-31 DIAGNOSIS — L02.91 ABSCESS: ICD-10-CM

## 2023-05-31 DIAGNOSIS — B19.10 HEPATITIS B VIRUS INFECTION, UNSPECIFIED CHRONICITY: ICD-10-CM

## 2023-05-31 PROCEDURE — 87070 CULTURE OTHR SPECIMN AEROBIC: CPT | Performed by: FAMILY MEDICINE

## 2023-05-31 PROCEDURE — 87186 SC STD MICRODIL/AGAR DIL: CPT | Performed by: FAMILY MEDICINE

## 2023-05-31 PROCEDURE — 87077 CULTURE AEROBIC IDENTIFY: CPT | Performed by: FAMILY MEDICINE

## 2023-05-31 PROCEDURE — 99214 OFFICE O/P EST MOD 30 MIN: CPT | Performed by: FAMILY MEDICINE

## 2023-05-31 RX ORDER — SULFAMETHOXAZOLE/TRIMETHOPRIM 800-160 MG
1 TABLET ORAL 2 TIMES DAILY
Qty: 28 TABLET | Refills: 0 | Status: SHIPPED | OUTPATIENT
Start: 2023-05-31 | End: 2023-06-08

## 2023-05-31 ASSESSMENT — PAIN SCALES - GENERAL: PAINLEVEL: MODERATE PAIN (5)

## 2023-05-31 NOTE — PROGRESS NOTES
Assessment & Plan     Hidradenitis axillaris  I recommended treatment with Bactrim.  We are also checking a wound culture.  She was given referrals to the wound clinic and dermatology for further evaluation and to help with treatment.    - Wound Care Referral; Future  - Adult Dermatology Referral; Future    Abscess  - Adult Dermatology Referral; Future  - Abscess Aerobic Bacterial Culture Routine  - sulfamethoxazole-trimethoprim (BACTRIM DS) 800-160 MG tablet; Take 1 tablet by mouth 2 times daily for 14 days    Open wound  - Wound Care Referral; Future    ILD (interstitial lung disease) (H)  This issue has been stable.  She will continue following with pulmonology.    Polymyositis (H)  This issue is stable.  She will continue following with rheumatology.    Hepatitis B virus infection, unspecified chronicity  This issue is stable.  She will continue following with gastroenterology.                       Lino Godoy [facility]    Ana Luisa Dominguez is a 52 year old, presenting for the following health issues:  Boil        5/31/2023     1:04 PM   Additional Questions   Roomed by Jose Daniel DE OLIVEIRA     History of Present Illness       Reason for visit:  Follow up and boil assessmemt    She eats 2-3 servings of fruits and vegetables daily.She consumes 0 sweetened beverage(s) daily.She exercises with enough effort to increase her heart rate 30 to 60 minutes per day.  She exercises with enough effort to increase her heart rate 6 days per week.   She is taking medications regularly.             She presents to the clinic with her  to be evaluated for cysts/abscesses in her axillary regions bilaterally.  She reports having issues with this over the past 6 weeks or more.  She was seen in our clinic for this on 4/12/2023 and was diagnosed with hidradenitis axillaris.  She was given a prescription for tetracycline which provided some relief, but symptoms never completely resolved.  She is  "now having more cysts that are developing in the right axillary region which are now starting to drain.  The cyst that drained in the left armpit still has not healed and there is currently an open wound that still drains from time to time.  She is not having fevers.  She has a medical history significant for chronic hepatitis B (on Entecavir) polymyositis with ILD (on mycophenolate and following with pulmonology and rheumatology), GERD, IBS and glucocorticoid induced osteoporosis.      Review of Systems   Constitutional, HEENT, cardiovascular, pulmonary, GI, , musculoskeletal, neuro, skin, endocrine and psych systems are negative, except as otherwise noted.      Objective    /66   Pulse 85   Temp 97.3  F (36.3  C) (Temporal)   Resp 20   Ht 1.676 m (5' 6\")   Wt 72.2 kg (159 lb 3.2 oz)   SpO2 97%   BMI 25.70 kg/m    Body mass index is 25.7 kg/m .  Physical Exam   GENERAL: healthy, alert and no distress  NECK: no adenopathy, no asymmetry, masses, or scars and thyroid normal to palpation  RESP: lungs clear to auscultation - no rales, rhonchi or wheezes  CV: regular rate and rhythm, normal S1 S2, no S3 or S4, no murmur, click or rub, no peripheral edema and peripheral pulses strong  MS: no gross musculoskeletal defects noted, no edema  SKIN: There is a 2.5 x 2 cm open wound in the left axillary region with scant discharge.  There are several tender cystic lesions in the right axillary region.  1 of which is draining purulent discharge.  NEURO: Normal strength and tone, mentation intact and speech normal  PSYCH: mentation appears normal, affect normal/bright                    "

## 2023-05-31 NOTE — TELEPHONE ENCOUNTER
Consult received via Workqueue from Lino Jorgensen DO in [facility] for wound of the axilla due to hidradenitis    Please schedule with providers Zenon or Janeth at [facility] [place] for next available appointment.    **If scheduling with Marielos YOUNG or Dr. Dominguez please schedule a follow up 2-3 weeks after initial appointment.    Is the patient able to make their own medical decisions? Per chart review, YES    Is patient a NAIDA lift? PLEASE INQUIRE WHEN MAKING THE APPOINTMENT AND PUT IN APPOINTMENT NOTES    Routing to  Wound Healing Scheduling.

## 2023-06-01 ENCOUNTER — HOSPITAL ENCOUNTER (OUTPATIENT)
Dept: CARDIAC REHAB | Facility: CLINIC | Age: 53
Discharge: HOME | End: 2023-06-01
Attending: INTERNAL MEDICINE
Payer: COMMERCIAL

## 2023-06-01 PROCEDURE — G0239 OTH RESP PROC, GROUP: HCPCS

## 2023-06-03 LAB
BACTERIA ABSC ANAEROBE+AEROBE CULT: ABNORMAL
BACTERIA ABSC ANAEROBE+AEROBE CULT: ABNORMAL

## 2023-06-06 ENCOUNTER — TELEPHONE (OUTPATIENT)
Dept: DERMATOLOGY | Facility: CLINIC | Age: 53
End: 2023-06-06
Payer: COMMERCIAL

## 2023-06-06 ENCOUNTER — HOSPITAL ENCOUNTER (OUTPATIENT)
Dept: CARDIAC REHAB | Facility: CLINIC | Age: 53
Discharge: HOME | End: 2023-06-06
Attending: INTERNAL MEDICINE
Payer: COMMERCIAL

## 2023-06-06 PROCEDURE — G0239 OTH RESP PROC, GROUP: HCPCS | Performed by: REHABILITATION PRACTITIONER

## 2023-06-06 NOTE — TELEPHONE ENCOUNTER
Lvm my chart msg for patient to schedule the  following:    Appointment type: New HS  Provider: Any  Return date: First available  Specialty phone number: [phone]

## 2023-06-07 NOTE — TELEPHONE ENCOUNTER
Via phone patient was scheduled for the  following:    Appointment type: New HS  Provider:   Return date: 6-8-23  Specialty phone number: [phone]

## 2023-06-07 NOTE — PATIENT INSTRUCTIONS
Plan  - Labs today for vitamin D, zinc, tuberculosis, Hgba1c, CBC with differential,   - Clobetasol ointment 1-2x a day   - Continue bactrim twice a day   - Shingles vaccine (one now and one 8 weeks later)   - Get pneumonia vaccine in 1 year  - Bandages ordered. If you have any issues with the bandages call Can call [facility] Medical Equipment:  [phone]            HIDRADENITIS SUPPURATIVA     What is hidradenitis suppurativa (HS)?  Hidradenitis suppurativa (HS) is a chronic skin disorder affecting the hair follicles. The disease starts with sore red lumps (or boils), typically involving the armpits, breasts, lower abdomen, groin, and buttocks. These lesions appear suddenly, increase in size and then burst or rupture, usually under the surface of the skin. This causes severe pain. Sometimes they rupture to the surface of the skin, draining pus. In some people, they can result in tunnels under the skin that may or may not continue to drain. When the lumps heal, they can leave scars.     Facts:   HS is a chronic skin disease, that comes and goes in flares, and is very painful  HS happens in many people - men and women, young and elderly, all races and ethnicities. But HS is more common in young adults, women  and skin of color  One out of three people with HS has a family member with HS   HS is NOT due to an infection or washing habits  HS is NOT contagious, so it cannot be spread from one person to another person   HS is NOT caused by how well you wash or what soaps you use  HS is NOT caused by smoking or obesity, but quitting smoking and losing weight have helped some people        Causes  The cause of HS remains unclear. It is thought that there is a problem in the hair follicle that makes it weaker and easier to break open. The current theory is that there are three steps that cause an HS lesion to form::   The hair follicle gets plugged   The hair follicle swells and then ruptures, causing pain and  inflammation   In some people, the inflammation does not stop and results in tunneling through the skin       Associated Conditions  HS is associated with several other medical conditions and activities including:  Tobacco use  High cholesterol, heart disease and stroke   Type 2 diabetes   Depression and anxiety   Arthritis, which causes stiffness and pain in joints   Inflammatory bowel disease (including Crohn's disease and ulcerative colitis)  Severe acne and pilonidal sinus/cyst  Polycystic ovarian syndrome  Skin cancer in areas of longstanding HS lesions, typically in the groin or buttocks (rare)    It is important to ask your physician to watch out for these conditions.      To help manage mental health issues related to HS and emotional support:  Help finding a mental health specialist: https://www.psychologyLimeLife.com/us/therapists  Suicide prevention (24/7 free confidential support):   Phone: (243) 905-1191  Website: https://suicidepreventionlifeline.org/    Support groups:    Hope for HS: www.hopeforhs.org  HS Connect: www.[facility]    Intimacy support: American Association of Sexuality Educators, Counselors and Therapists (AASECT) website: https://www.aasect.org    For help quitting smoking   Phone:  English: 8-535-VJPD-NOW (1-409.600.5055)  Togolese: 7-211-GIHQVE-YA (1-894.354.8477)    Website:   English: https://smokefree.gov/  Togolese: gerald.smokefree.gov     Lifestyle Modifications   Quitting smoking or weight loss can help your overall health and may help improve HS symptoms in some people, but not everyone.   It is recommended to eat a well-balanced, healthy diet (https://health.gov/dietaryguidelines) and to maintain a healthy weight. Avoiding dietary triggers can help some people suffering with HS, but will not necessarily help others with HS.    Some people may find that friction, irritation, and rubbing may worsen HS symptoms. Some people do better with loose, breathable clothing and fabrics.  Shaving close to the affected areas may also worsen HS in some people. But, not everyone has the same triggers for their HS, so see what works for you!    Some medications may worsen HS such as lithium, testosterone, and some progesterone-only birth control.  Please discuss this with your provider before stopping medication.     Zinc supplementation has been shown in some studies to help HS. However, every treatment can have a side effects,  so talk to your provider before starting any treatment.     Treatment                    Medicines, procedures and surgeries are offered to treat HS. Treatment can help reduce breakouts and improve quality of life.  Medicines used:  Topical washes (e.g. chlorhexidine, benzoyl peroxide)    Clindamycin on the skin - seems effective for pustules and papules. Probably not helpful for larger chronic lesions.     Oral antibiotics (e.g. doxycycline, clindamycin + rifampin, dapsone) - antibiotics may help some, but not all patients    Hormonal therapies (e.g. spironolactone, oral contraceptives) - primarily used in females though to have a hormonal component to their HS (e.g perimenstrual flares, worsening in pregnancy, polycystic ovarian syndrome)    Immunosuppression (e.g. prednisone)     Injectables (e.g. adalimumab, infliximab) - Adalimumab is the only federal drug administration (FDA) approved medication for HS  Education for adalimumab injections: https://www.[employer]adiio/humira-complete/injection  Adalimumab abassador program:   Website: https://www.[employer]/humira-complete/sign-up/  Phone: 2.198.7HLTATJ (1.423.809.5506)    Procedures:  Intralesional steroid injections - may help areas of acute active inflammation     Some hair removal lasers - If considering this option, we recommend doing this only with a medical professional that has experience treating HS.     Surgeries:  Incision and drainage - Provides fast, short-term relief, but symptoms likely to recur.        Deroofing  - This surgery involves opening the lesion and cleaning out the base. This treatment is effective for recurring lesions.  Recurrence rates seem to be similar to excision. These are typically left open and allowed to heal on their own over 1-3 months      Excision - This involves cutting out chronic lesions.  This may entail cutting out a large affected area referred to as wide local excision, or cutting out single lesions, referred to as localized excisions.  Excision may be done with a scalpel, electric heating device or laser (e.g. carbon dioxide [CO2] laser).  These are either allowed to heal on their own, closed with sutures, or closed with a graft or flap.  Grafts are typically done by taking skin from one site of the body and using it to close another part of the body.  Flaps are done by moving tissue around to close a wound.     Check out this website to help decide the best treatment options for you!     https://www.informed-decisions.org/hidradenitispda.php

## 2023-06-08 ENCOUNTER — OFFICE VISIT (OUTPATIENT)
Dept: DERMATOLOGY | Facility: CLINIC | Age: 53
End: 2023-06-08
Attending: PHYSICAL MEDICINE & REHABILITATION
Payer: COMMERCIAL

## 2023-06-08 ENCOUNTER — LAB (OUTPATIENT)
Dept: LAB | Facility: CLINIC | Age: 53
End: 2023-06-08
Attending: DERMATOLOGY
Payer: COMMERCIAL

## 2023-06-08 ENCOUNTER — HOSPITAL ENCOUNTER (OUTPATIENT)
Dept: CARDIAC REHAB | Facility: CLINIC | Age: 53
Discharge: HOME | End: 2023-06-08
Attending: INTERNAL MEDICINE
Payer: COMMERCIAL

## 2023-06-08 VITALS
HEART RATE: 93 BPM | DIASTOLIC BLOOD PRESSURE: 74 MMHG | OXYGEN SATURATION: 96 % | BODY MASS INDEX: 25.92 KG/M2 | SYSTOLIC BLOOD PRESSURE: 129 MMHG | WEIGHT: 160.56 LBS

## 2023-06-08 DIAGNOSIS — L02.91 ABSCESS: ICD-10-CM

## 2023-06-08 DIAGNOSIS — L73.2 HIDRADENITIS SUPPURATIVA: ICD-10-CM

## 2023-06-08 DIAGNOSIS — L73.2 HIDRADENITIS SUPPURATIVA: Primary | ICD-10-CM

## 2023-06-08 DIAGNOSIS — D84.9 IMMUNOSUPPRESSED STATUS (H): ICD-10-CM

## 2023-06-08 DIAGNOSIS — B18.1 CHRONIC VIRAL HEPATITIS B WITHOUT DELTA AGENT AND WITHOUT COMA (H): ICD-10-CM

## 2023-06-08 LAB
ALBUMIN SERPL BCG-MCNC: 4.2 G/DL (ref 3.5–5.2)
ALP SERPL-CCNC: 93 U/L (ref 35–104)
ALT SERPL W P-5'-P-CCNC: 23 U/L (ref 10–35)
AST SERPL W P-5'-P-CCNC: 31 U/L (ref 10–35)
BASOPHILS # BLD AUTO: 0.1 10E3/UL (ref 0–0.2)
BASOPHILS NFR BLD AUTO: 1 %
BILIRUB DIRECT SERPL-MCNC: <0.2 MG/DL (ref 0–0.3)
BILIRUB SERPL-MCNC: 0.2 MG/DL
CRP SERPL-MCNC: 6.42 MG/L
DEPRECATED CALCIDIOL+CALCIFEROL SERPL-MC: 48 UG/L (ref 20–75)
EOSINOPHIL # BLD AUTO: 0.3 10E3/UL (ref 0–0.7)
EOSINOPHIL NFR BLD AUTO: 3 %
ERYTHROCYTE [DISTWIDTH] IN BLOOD BY AUTOMATED COUNT: 14.7 % (ref 10–15)
ERYTHROCYTE [SEDIMENTATION RATE] IN BLOOD BY WESTERGREN METHOD: 32 MM/HR (ref 0–30)
HBA1C MFR BLD: 6.2 %
HCT VFR BLD AUTO: 42.9 % (ref 35–47)
HGB BLD-MCNC: 13.1 G/DL (ref 11.7–15.7)
IMM GRANULOCYTES # BLD: 0 10E3/UL
IMM GRANULOCYTES NFR BLD: 0 %
LYMPHOCYTES # BLD AUTO: 3.5 10E3/UL (ref 0.8–5.3)
LYMPHOCYTES NFR BLD AUTO: 38 %
MCH RBC QN AUTO: 24.1 PG (ref 26.5–33)
MCHC RBC AUTO-ENTMCNC: 30.5 G/DL (ref 31.5–36.5)
MCV RBC AUTO: 79 FL (ref 78–100)
MONOCYTES # BLD AUTO: 0.8 10E3/UL (ref 0–1.3)
MONOCYTES NFR BLD AUTO: 9 %
NEUTROPHILS # BLD AUTO: 4.6 10E3/UL (ref 1.6–8.3)
NEUTROPHILS NFR BLD AUTO: 49 %
NRBC # BLD AUTO: 0 10E3/UL
NRBC BLD AUTO-RTO: 0 /100
PLATELET # BLD AUTO: 235 10E3/UL (ref 150–450)
PROT SERPL-MCNC: 8.4 G/DL (ref 6.4–8.3)
RBC # BLD AUTO: 5.44 10E6/UL (ref 3.8–5.2)
WBC # BLD AUTO: 9.3 10E3/UL (ref 4–11)

## 2023-06-08 PROCEDURE — 80076 HEPATIC FUNCTION PANEL: CPT | Performed by: PATHOLOGY

## 2023-06-08 PROCEDURE — 85652 RBC SED RATE AUTOMATED: CPT | Performed by: PATHOLOGY

## 2023-06-08 PROCEDURE — 99000 SPECIMEN HANDLING OFFICE-LAB: CPT | Performed by: PATHOLOGY

## 2023-06-08 PROCEDURE — G0463 HOSPITAL OUTPT CLINIC VISIT: HCPCS | Performed by: DERMATOLOGY

## 2023-06-08 PROCEDURE — 86140 C-REACTIVE PROTEIN: CPT | Performed by: PATHOLOGY

## 2023-06-08 PROCEDURE — 36415 COLL VENOUS BLD VENIPUNCTURE: CPT | Performed by: DERMATOLOGY

## 2023-06-08 PROCEDURE — 90471 IMMUNIZATION ADMIN: CPT | Performed by: DERMATOLOGY

## 2023-06-08 PROCEDURE — 84630 ASSAY OF ZINC: CPT | Mod: 90 | Performed by: PATHOLOGY

## 2023-06-08 PROCEDURE — 85025 COMPLETE CBC W/AUTO DIFF WBC: CPT | Performed by: PATHOLOGY

## 2023-06-08 PROCEDURE — G0239 OTH RESP PROC, GROUP: HCPCS | Performed by: REHABILITATION PRACTITIONER

## 2023-06-08 PROCEDURE — 250N000021 HC RX MED A9270 GY (STAT IND- M) 250: Performed by: DERMATOLOGY

## 2023-06-08 PROCEDURE — 90750 HZV VACC RECOMBINANT IM: CPT | Performed by: DERMATOLOGY

## 2023-06-08 PROCEDURE — 99204 OFFICE O/P NEW MOD 45 MIN: CPT | Performed by: DERMATOLOGY

## 2023-06-08 PROCEDURE — 82306 VITAMIN D 25 HYDROXY: CPT | Performed by: DERMATOLOGY

## 2023-06-08 PROCEDURE — 83036 HEMOGLOBIN GLYCOSYLATED A1C: CPT | Mod: 90 | Performed by: PATHOLOGY

## 2023-06-08 PROCEDURE — 86481 TB AG RESPONSE T-CELL SUSP: CPT | Mod: 90 | Performed by: PATHOLOGY

## 2023-06-08 RX ORDER — SULFAMETHOXAZOLE/TRIMETHOPRIM 800-160 MG
1 TABLET ORAL 2 TIMES DAILY
Qty: 60 TABLET | Refills: 3 | Status: SHIPPED | OUTPATIENT
Start: 2023-06-08 | End: 2023-07-08

## 2023-06-08 RX ORDER — CLOBETASOL PROPIONATE 0.5 MG/G
OINTMENT TOPICAL 2 TIMES DAILY
Qty: 60 G | Refills: 0 | Status: SHIPPED | OUTPATIENT
Start: 2023-06-08 | End: 2024-06-13

## 2023-06-08 RX ADMIN — ZOSTER VACCINE RECOMBINANT, ADJUVANTED 0.5 ML: KIT at 11:43

## 2023-06-08 ASSESSMENT — PAIN SCALES - GENERAL: PAINLEVEL: NO PAIN (0)

## 2023-06-08 NOTE — PROGRESS NOTES
[facility] Dermatology Note  Encounter Date: Jun 8, 2023  Office Visit       Dermatology Problem List:  1. Hidradenitis suppurativa with ulcerating/PG-like phenotype in left axilla  2. Chronic hepatitis B (taking Entrcavir)  3. Polymyositis 2005  4. Interstitial lung disease    ___________________________________    Assessment & Plan:  #  Hidradenitis suppurativa   - Start topical clobetasol (1-2 times a day) and continue with bactrim    - Discuss with Rheumatology abnd pulmonary about starting Humira   - Discussed that there is limited data on combining and adalimumab with cellcept but she is on a lower dose. I warned of increased risk of infection. Other risks of adalimumab include MS, lymphoma, skin cancer.   - Labs for today: CBC differential, quantiferon, ESRD, vitamin D, zinc, CRP, hemoglobin A1C    - Order bandages    - Ordered Shingrix vaccine   - Given PCV23 vaccine administration 2/3/2023, get next pneumonia vaccine in a year    - Given rapid tunneling presentation, will pursue more aggressive treatment, but need to manage immune suppression overall given PMH and current medications/ discuss with rheumatology    - Counseled on side effects of long term antibiotics (I.e., clostridium difficile)    - Talked to patient about comfort level for such treatment, especially given current treatment with CellCept, as well as contrast with prednisone long term    - Discussed HS options (e.g., Humira, topical creams for itching / slow progression, new treatments like ERIK inhibitors)   - Talked about how signs look fairly classic for HS   - Discussed HS basics (e.g., when it presents/presentation, not an infection)    - Counseled on PG (ulcers that do not heal); not currently pursuing biopsy for this and will treat as HS for now   - Discussed patient's concern about being on long-term prednisone and implications for HS     Wound Care Order Documentation  Patient presents for evaluation and treatment  of hidradenitis suppurativa (ICD 10: L73.2).  The wound it neither surgical/debrided or related to a burn or pressure injury.  Stage: Full thickness    The patient is in need of wound care and dressing changes for management and healing of their current wound(s).     Name: Angelica Jett  : 1970  INSURER: Payor: MEDICA / Plan: MEDICA VANTAGE PLUS SELF INSURED / Product Type: Indemnity /   Policy ID#:  [id number]        2023    11:00 AM   Bandage Order/Info   Wound Size (Left Axilla) LxWxD  2.5 cm x 2 cm x 0.2 cm   Drainage Amount (Left Axilla) Heavy   Primary Bandage (Left Axilla) Mepilex with border   Bandage Size (Left Axilla) 4x4 in   Change Freq (Left Axilla) BID                       Total for 30 days = 60  90 day supply needed     Follow-up: Phone visit in 2 weeks     Staff and Scribe:   I, Phyllis Nava, am serving as a medical student to document services personally performed by Sanjiv Sotomayor MD based on data collection and the provider's statements to me.     Provider Disclosure:   The documentation recorded by the scribe accurately reflects the services I personally performed and the decisions made by me.    Sanjiv Sotomayor MD, FAAD    Departments of Internal Medicine and Dermatology  [facility]  [phone]    ___________________________________    CC: Consult (Hidradenitis suppura )    HPI:  Ms. Angelica Jett is a(n) 52 year old female with a history of Hepatitis B, IPF, polymyositis, and pulmonary hypertension who presents today as a new patient for HS.       She says involvement started in the left axilla around mid-March, which started as little boils, which swelled up and have drained. She was seen by primary care for this, and they prescribed her tetracycline for a week, which did not provide any relief. Today, she says she has an open wound in the right axilla, which has been an open wound since April and has been growing in size. Since then, she  "describes new involvement in the right axilla, which started 2 weeks ago, as acne-like lesions.  She says these are currently draining, as she can see purulence after removing the bandaid. Initially, it was hard, inflamed, and very painful. At baseline, she feels that the pain is a 5 out of 10. She is not currently taking anything for the pain and has never experienced this before (in the past 6 months before initial presentation, had not had any \"acne-like\" lesions. In the past year, she has only had one such spot, which self-resolved). In 2019, she noticed some involvement in the groin/ inguinal folds, but she was told by the physician ([facility]) that this could be a side effect of the CellCept. She is postmenopausal, but mentioned that lesions did not worsen with her cycles.      She has used a warm towel, bandages, and regularly cleans the lesions, and says this has been helpful. She does not feel that weather impacts her HS, but movement does exacerbate the pain. She is currently doing pulmonary rehabilitation for her PH and has had to discontinue weight bearing exercises because of this. On 5/31, she was prescribed bactrim for the involvement, which she feels has been very helpful and has helped \"dry up\" the lesions. She has not had any new lesions since then. Initially, she was expereincing some numbness and cramping for the first few days in the toes after taking the bactrim, but this has since resolved and is otherwise tolerating the bactrim well.      She has an appointment with wound doctor in July, and describes her hepatitis B as stable. She has not been taking prednisone (last took February 2023, took since 2004) and spiranalactone.       No pertinent family history for similar skin or autoimmune conditions.     She is open to different types of treatments      PMHx  - Chronic hepatitis B (taking Entrcavir)  - Polymyositis 2005  - Interstitial lung disease  - Secondary pulmonary arterial " hypertension, resolved in 2019    Patient is otherwise feeling well, without additional skin concerns.       HS Nurse Assessment        6/8/2023     9:50 AM 6/22/2023     3:20 PM   Nurse Assessment Data   Over the past 30 days how many old lesions flared back up?  2   Over the past 30 days how many new lesions did you get?  0   Over the past week, how many dressing changes do you do each day?  2   Over the past week, has your wound drainage been:  Moderate   Rate your HS overall from 0 - 10 (0 = no disease, 10 = worst) over the past week:   5   Rate your pain score from 0 - 10 (0 = no disease, 10 = worst) for the most painful/symptomatic lesion in the past week:   5 - Moderate Pain   Over the past week, how much has HS influenced your quality of life?  moderately   Total DLQI Score 1 (no effect at all on patient's life)      Depression/Anxiety: No  Sexual Dysfunction: No  IBD: No   Spondyloarthropathy: No  Hypertension: No  Hyperlipidemia: No  DM: Not sure - was on prednisone for so long (borderline)   Obesity: No     Social History:   Lives with partner, who is a physician working in the public health department at the [employer]. She has one child who is in college and is not currently working. Moved to [place] a year ago.    Labs Reviewed:  -Discussed lab culture (5/31) and how this is an elevated level of a normal bacteria     Physical Exam:  Vitals: /74 (BP Location: Left arm, Patient Position: Sitting, Cuff Size: Adult Regular)   Pulse 93   Wt 72.8 kg (160 lb 9 oz)   SpO2 96%   BMI 25.92 kg/m    SKIN: Full skin, which includes the head/face, both arms, thighs, and/or groin buttocks was examined.    HS Data      6/8/2023    11:00 AM   HS Exam Data   LC Type LC1   Clinical Subtypes Regular type   Acne? No   Dissecting Cellulitis? No   Visual analogue score (0-100) 40   Total Lindsay Stage I   Total Inflammatory Nodules 1   Total Abcesses 0   Total Draining Tunnels 1   Total Abscess and Nodule Count 1   IHS4  Score  5   Total  HASI Score 27   HS-PGA 1     Comments: On physical exam red, ulcerated, hemorrahgic open wound 2.5 cm x 2 cm on the left axilla. Two inflammatory nodules, one of which is currently draining with signs of post inflammatory pigmentation, which is 3 cm x 1 cm x 2 mm. No old scarring on the left thigh. 3-4 scarring / old spots on the right thigh. Examined gluteal cleft and found no signs of pilonidal cyst     HS Data      6/8/2023    11:00 AM   HS Exam Data   LC Type LC1   Clinical Subtypes Regular type   Acne? No   Dissecting Cellulitis? No   Visual analogue score (0-100) 40   Total Lindsay Stage I   Total Inflammatory Nodules 1   Total Abcesses 0   Total Draining Tunnels 1   Total Abscess and Nodule Count 1   IHS4 Score  5   Total  HASI Score 27   HS-PGA 1     Medications:  Current Outpatient Medications   Medication     Calcium Citrate-Vitamin D3 315-6.25 MG-MCG TABS     clobetasol (TEMOVATE) 0.05 % external ointment     entecavir (BARACLUDE) 0.5 MG tablet     fluticasone-vilanterol (BREO ELLIPTA) 100-25 MCG/ACT inhaler     ibuprofen (ADVIL/MOTRIN) 600 MG tablet     mycophenolate (GENERIC EQUIVALENT) 500 MG tablet     mycophenolate (GENERIC EQUIVALENT) 500 MG tablet     sulfamethoxazole-trimethoprim (BACTRIM DS) 800-160 MG tablet     Vitamin D3 (VITAMIN D, CHOLECALCIFEROL,) 25 mcg (1000 units) tablet     No current facility-administered medications for this visit.      Past Medical History:   Patient Active Problem List   Diagnosis     Pulmonary hypertension (H)     Hepatitis B virus infection, unspecified chronicity     ILD (interstitial lung disease) (H)     Abscess     Hidradenitis axillaris     No past medical history on file.     DME (Durable Medical Equipment) Orders and Documentation  Orders Placed This Encounter   Procedures     Wound Care Order      The patient was assessed and it was determined the patient is in need of the following listed DME Supplies/Equipment. Please complete supporting  documentation below to demonstrate medical necessity.

## 2023-06-09 LAB — ZINC SERPL-MCNC: 78.8 UG/DL

## 2023-06-10 LAB
GAMMA INTERFERON BACKGROUND BLD IA-ACNC: 0.1 IU/ML
M TB IFN-G BLD-IMP: NEGATIVE
M TB IFN-G CD4+ BCKGRND COR BLD-ACNC: 9.9 IU/ML
MITOGEN IGNF BCKGRD COR BLD-ACNC: 0 IU/ML
MITOGEN IGNF BCKGRD COR BLD-ACNC: 0.06 IU/ML
QUANTIFERON MITOGEN: 10 IU/ML
QUANTIFERON NIL TUBE: 0.1 IU/ML
QUANTIFERON TB1 TUBE: 0.1 IU/ML
QUANTIFERON TB2 TUBE: 0.16

## 2023-06-13 ENCOUNTER — HOSPITAL ENCOUNTER (OUTPATIENT)
Dept: CARDIAC REHAB | Facility: CLINIC | Age: 53
Discharge: HOME | End: 2023-06-13
Attending: INTERNAL MEDICINE
Payer: COMMERCIAL

## 2023-06-13 PROCEDURE — G0239 OTH RESP PROC, GROUP: HCPCS | Performed by: REHABILITATION PRACTITIONER

## 2023-06-15 ENCOUNTER — HOSPITAL ENCOUNTER (OUTPATIENT)
Dept: CARDIAC REHAB | Facility: CLINIC | Age: 53
Discharge: HOME | End: 2023-06-15
Attending: INTERNAL MEDICINE
Payer: COMMERCIAL

## 2023-06-15 PROCEDURE — G0239 OTH RESP PROC, GROUP: HCPCS

## 2023-06-20 ENCOUNTER — HOSPITAL ENCOUNTER (OUTPATIENT)
Dept: CARDIAC REHAB | Facility: CLINIC | Age: 53
Discharge: HOME | End: 2023-06-20
Attending: INTERNAL MEDICINE
Payer: COMMERCIAL

## 2023-06-20 PROCEDURE — G0239 OTH RESP PROC, GROUP: HCPCS | Performed by: REHABILITATION PRACTITIONER

## 2023-06-21 NOTE — PROGRESS NOTES
[facility] Dermatology Note  Encounter Date: Jun 22, 2023  Start 5:55pm- 6:00pm    Dermatology Problem List:  1. Hidradenitis suppurativa with ulcerating/PG-like phenotype in left axilla  2. Chronic hepatitis B (taking Entrcavir)  3. Polymyositis 2005  4. Interstitial lung disease     ___________________________________     Assessment & Plan:  #  Hidradenitis suppurativa   - Continue topical clobetasol (1-2 times a day) and continue with bactrim    - Discussed with Rheumatology abnd pulmonary about starting Humira. They are OK with it. Could also consider ERIK inhibition as well, but would not do cellcept and ERIK injhibition together.  - Discussed that there is limited data on combining and adalimumab with cellcept but she is on a lower dose. I warned of increased risk of infection. Other risks of adalimumab include MS, lymphoma, skin cancer.   - re-Ordered bandages    - She had shingles vaccine at her last visit. Will plan for second dose at follow-up   - Given PCV23 vaccine administration 2/3/2023, get next pneumonia vaccine in a year    - Given rapid tunneling presentation, will pursue more aggressive treatment, I will keep a close eye on her and would lean toward starting humura.   - Counseled on side effects of long term antibiotics (I.e., clostridium difficile)    - Talked to patient about comfort level for such treatment, especially given current treatment with CellCept, as well as contrast with prednisone long term     Follow-up: 8 weeks in person    Staff and Scribe:     Sanjiv Sotomayor MD, FAAD, FACP     Departments of Internal Medicine and Dermatology  [facility]  [phone]    ____________________________________________    CC:   Chief Complaint   Patient presents with     Follow Up     HS follow up. Unable to get bandages due to pharmacy not having prescription from doctor.      HPI:  Ms. Angeilca Jett is a(n) 52 year old female who presents today  as a return patient for HS. Last seen by myself June 8, 2023. Started on topical clobetasol and continued on Bactrim. Discussed with rheum and pulm about possibly starting Humira. She want to hold off on further immunosuppression for now. She is going to stay on bactrim and do clobetasol oinbtment and see if that helps.   HS Nurse Assessment        6/8/2023     9:50 AM 6/22/2023     3:20 PM   Nurse Assessment Data   Over the past 30 days how many old lesions flared back up?  2   Over the past 30 days how many new lesions did you get?  0   Over the past week, how many dressing changes do you do each day?  2   Over the past week, has your wound drainage been:  Moderate   Rate your HS overall from 0 - 10 (0 = no disease, 10 = worst) over the past week:   5   Rate your pain score from 0 - 10 (0 = no disease, 10 = worst) for the most painful/symptomatic lesion in the past week:   5 - Moderate Pain   Over the past week, how much has HS influenced your quality of life?  moderately   Total DLQI Score 1 (no effect at all on patient's life)       Patient is otherwise feeling well, without additional skin concerns.     Medications:  Current Outpatient Medications   Medication     Calcium Citrate-Vitamin D3 315-6.25 MG-MCG TABS     clobetasol (TEMOVATE) 0.05 % external ointment     entecavir (BARACLUDE) 0.5 MG tablet     fluticasone-vilanterol (BREO ELLIPTA) 100-25 MCG/ACT inhaler     ibuprofen (ADVIL/MOTRIN) 600 MG tablet     mycophenolate (GENERIC EQUIVALENT) 500 MG tablet     mycophenolate (GENERIC EQUIVALENT) 500 MG tablet     sulfamethoxazole-trimethoprim (BACTRIM DS) 800-160 MG tablet     Vitamin D3 (VITAMIN D, CHOLECALCIFEROL,) 25 mcg (1000 units) tablet     No current facility-administered medications for this visit.      Past Medical History:   Patient Active Problem List   Diagnosis     Pulmonary hypertension (H)     Hepatitis B virus infection, unspecified chronicity     ILD (interstitial lung disease) (H)      Abscess     Hidradenitis axillaris     CC Lino Godoy, DO  [address] [address]  [place] [address] on close of this encounter.

## 2023-06-22 ENCOUNTER — VIRTUAL VISIT (OUTPATIENT)
Dept: DERMATOLOGY | Facility: CLINIC | Age: 53
End: 2023-06-22
Payer: COMMERCIAL

## 2023-06-22 ENCOUNTER — HOSPITAL ENCOUNTER (OUTPATIENT)
Dept: CARDIAC REHAB | Facility: CLINIC | Age: 53
Discharge: HOME | End: 2023-06-22
Attending: INTERNAL MEDICINE
Payer: COMMERCIAL

## 2023-06-22 ENCOUNTER — TELEPHONE (OUTPATIENT)
Dept: DERMATOLOGY | Facility: CLINIC | Age: 53
End: 2023-06-22

## 2023-06-22 DIAGNOSIS — L73.2 HIDRADENITIS SUPPURATIVA: Primary | ICD-10-CM

## 2023-06-22 PROCEDURE — 99214 OFFICE O/P EST MOD 30 MIN: CPT | Mod: 93 | Performed by: DERMATOLOGY

## 2023-06-22 PROCEDURE — G0239 OTH RESP PROC, GROUP: HCPCS

## 2023-06-22 ASSESSMENT — PAIN SCALES - GENERAL: PAINLEVEL: MODERATE PAIN (5)

## 2023-06-22 NOTE — PATIENT INSTRUCTIONS
Continue bactrim and clobetasol for now  Will consider humira at follow-up  Bandages ordered. If you have any issues with the bandages call Can call [facility] Medical Equipment:  [phone]

## 2023-06-22 NOTE — LETTER
6/22/2023       RE: Angelica Jett  [address]  [id number]  [facility] [address]     Dear Colleague,    Thank you for referring your patient, Angelica Jett, to the [facility] DERMATOLOGY CLINIC [place] at [facility]. Please see a copy of my visit note below.    [facility] Dermatology Note  Encounter Date: Jun 22, 2023  Start 5:55pm- 6:00pm    Dermatology Problem List:  1. Hidradenitis suppurativa with ulcerating/PG-like phenotype in left axilla  2. Chronic hepatitis B (taking Entrcavir)  3. Polymyositis 2005  4. Interstitial lung disease     ___________________________________     Assessment & Plan:  #  Hidradenitis suppurativa   - Continue topical clobetasol (1-2 times a day) and continue with bactrim    - Discussed with Rheumatology abnd pulmonary about starting Humira. They are OK with it. Could also consider ERIK inhibition as well, but would not do cellcept and ERIK injhibition together.  - Discussed that there is limited data on combining and adalimumab with cellcept but she is on a lower dose. I warned of increased risk of infection. Other risks of adalimumab include MS, lymphoma, skin cancer.   - re-Ordered bandages    - She had shingles vaccine at her last visit. Will plan for second dose at follow-up   - Given PCV23 vaccine administration 2/3/2023, get next pneumonia vaccine in a year    - Given rapid tunneling presentation, will pursue more aggressive treatment, I will keep a close eye on her and would lean toward starting humura.   - Counseled on side effects of long term antibiotics (I.e., clostridium difficile)    - Talked to patient about comfort level for such treatment, especially given current treatment with CellCept, as well as contrast with prednisone long term     Follow-up: 8 weeks in person    Staff and Scribe:     Sanjiv Sotomayor MD, FAAD, FACP     Departments of Internal Medicine and  Dermatology  [facility]  [phone]    ____________________________________________    CC:   Chief Complaint   Patient presents with    Follow Up     HS follow up. Unable to get bandages due to pharmacy not having prescription from doctor.      HPI:  Ms. Angelica Jett is a(n) 52 year old female who presents today as a return patient for HS. Last seen by myself June 8, 2023. Started on topical clobetasol and continued on Bactrim. Discussed with rheum and pulm about possibly starting Humira. She want to hold off on further immunosuppression for now. She is going to stay on bactrim and do clobetasol oinbtment and see if that helps.   HS Nurse Assessment        6/8/2023     9:50 AM 6/22/2023     3:20 PM   Nurse Assessment Data   Over the past 30 days how many old lesions flared back up?  2   Over the past 30 days how many new lesions did you get?  0   Over the past week, how many dressing changes do you do each day?  2   Over the past week, has your wound drainage been:  Moderate   Rate your HS overall from 0 - 10 (0 = no disease, 10 = worst) over the past week:   5   Rate your pain score from 0 - 10 (0 = no disease, 10 = worst) for the most painful/symptomatic lesion in the past week:   5 - Moderate Pain   Over the past week, how much has HS influenced your quality of life?  moderately   Total DLQI Score 1 (no effect at all on patient's life)       Patient is otherwise feeling well, without additional skin concerns.     Medications:  Current Outpatient Medications   Medication    Calcium Citrate-Vitamin D3 315-6.25 MG-MCG TABS    clobetasol (TEMOVATE) 0.05 % external ointment    entecavir (BARACLUDE) 0.5 MG tablet    fluticasone-vilanterol (BREO ELLIPTA) 100-25 MCG/ACT inhaler    ibuprofen (ADVIL/MOTRIN) 600 MG tablet    mycophenolate (GENERIC EQUIVALENT) 500 MG tablet    mycophenolate (GENERIC EQUIVALENT) 500 MG tablet    sulfamethoxazole-trimethoprim (BACTRIM DS) 800-160 MG tablet    Vitamin D3  (VITAMIN D, CHOLECALCIFEROL,) 25 mcg (1000 units) tablet     No current facility-administered medications for this visit.      Past Medical History:   Patient Active Problem List   Diagnosis    Pulmonary hypertension (H)    Hepatitis B virus infection, unspecified chronicity    ILD (interstitial lung disease) (H)    Abscess    Hidradenitis axillaris     CC Lino Godoy,   [address]  [place] [address] on close of this encounter.

## 2023-06-22 NOTE — NURSING NOTE
Chief Complaint   Patient presents with     Follow Up     HS follow up. Unable to get bandages due to pharmacy not having prescription from doctor.      Beckie FERNANDEZ CMA

## 2023-06-22 NOTE — TELEPHONE ENCOUNTER
Hello,    [facility] has contact [facility] on a few occasions looking for wound orders. We are still not seeing anything. Can this please get ordered so we can follow up with patient?    Thank You

## 2023-06-29 ENCOUNTER — HOSPITAL ENCOUNTER (OUTPATIENT)
Dept: CARDIAC REHAB | Facility: CLINIC | Age: 53
Discharge: HOME | End: 2023-06-29
Attending: INTERNAL MEDICINE
Payer: COMMERCIAL

## 2023-06-29 PROCEDURE — G0239 OTH RESP PROC, GROUP: HCPCS

## 2023-07-06 ENCOUNTER — HOSPITAL ENCOUNTER (OUTPATIENT)
Dept: CARDIAC REHAB | Facility: CLINIC | Age: 53
Discharge: HOME | End: 2023-07-06
Attending: INTERNAL MEDICINE
Payer: COMMERCIAL

## 2023-07-06 PROCEDURE — G0238 OTH RESP PROC, INDIV: HCPCS | Performed by: REHABILITATION PRACTITIONER

## 2023-07-11 ENCOUNTER — HOSPITAL ENCOUNTER (OUTPATIENT)
Dept: WOUND CARE | Facility: CLINIC | Age: 53
Discharge: HOME | End: 2023-07-11
Attending: FAMILY MEDICINE | Admitting: FAMILY MEDICINE
Payer: COMMERCIAL

## 2023-07-11 VITALS
HEART RATE: 78 BPM | DIASTOLIC BLOOD PRESSURE: 67 MMHG | BODY MASS INDEX: 25.33 KG/M2 | HEIGHT: 66 IN | TEMPERATURE: 97.9 F | SYSTOLIC BLOOD PRESSURE: 116 MMHG | WEIGHT: 157.6 LBS

## 2023-07-11 DIAGNOSIS — T14.8XXA OPEN WOUND: ICD-10-CM

## 2023-07-11 DIAGNOSIS — L98.492 SKIN ULCER OF AXILLA WITH FAT LAYER EXPOSED (H): Primary | ICD-10-CM

## 2023-07-11 DIAGNOSIS — L73.2 HIDRADENITIS AXILLARIS: ICD-10-CM

## 2023-07-11 PROCEDURE — G0463 HOSPITAL OUTPT CLINIC VISIT: HCPCS | Mod: 25

## 2023-07-11 PROCEDURE — 99204 OFFICE O/P NEW MOD 45 MIN: CPT | Performed by: FAMILY MEDICINE

## 2023-07-11 PROCEDURE — 97602 WOUND(S) CARE NON-SELECTIVE: CPT

## 2023-07-11 NOTE — PROGRESS NOTES
Patient arrived for wound care visit. Certified Wound Care Nurse time spent evaluating patient record, completed a full evaluation and documented wound(s) & karina-wound skin; provided recommendation based on treatment plan. Applied dressing, reviewed discharge instructions, patient education, and discussed plan of care with appropriate medical team staff members and patient and/or family members.

## 2023-07-11 NOTE — PROGRESS NOTES
Wound Clinic Note          Visit date: 07/11/2023       Cheif Complaint:     Angelica Jett is a 52 year old  female had concerns including WOUND CARE.  She has bilateral axillary wounds due to hidradenitis suppurativa.      HISTORY OF PRESENT ILLNESS:    Angelica Jett reports the ulcer has been present since March 2023.  The wound began without a clear cause.   Prior to March 2023 she had not had any other difficult to heal wounds in the axilla, groins or buttock area.  She has been referred to a dermatologist, Dr. Sotomayor, who she had a virtual visit with on June 22, 2023.  He had recommended she continue on antibiotics which she continues on now with no symptoms of an adverse reaction.  She is also been applying a clobetasol cream to the periwound areas.  He had also recommended that he apply to the insurance company for approval to use Humira.  However the patient chose to hold off on pursuing that option for now.  According to the note from the dermatologist the patient was expected to follow-up with the dermatologist again in 8 weeks, however there is no follow-up appointment scheduled at this time.    The patient reports she has just been applying a bandage to the left axillary wound and using the clobetasol cream and a Mepilex bandage changed twice a day.  There is been light serous drainage from the wound.  Has not been applying any bandages to the right axillary wound.        The pateint denies fevers or chills.  They report the pain from the wound has been 6/10 and has remained about the same recently.      Today the patient reports maintaining a regular diet without special attention to protein.        She does not have diabetes and does not smoke cigarettes.  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        The patient has recently had some symptoms of wound infection and is currently taking antibiotics which they have been tolerating well with no symptoms of an adverse  "reaction.       Problem List:   No past medical history on file.           Family Hx: family history is not on file.       Surgical Hx:   Past Surgical History:   Procedure Laterality Date     BIOPSY  2005     COLONOSCOPY  05/28/2021          Allergies:    Allergies   Allergen Reactions     Iodine Hives, Rash and Other (See Comments)     Patient reports this allergy is because she is allergic to shrimp       Shellfish-Derived Products Difficulty breathing and Hives     Shrimp       Tetanus Toxoids               Medication History:    Current Outpatient Medications   Medication Sig     Calcium Citrate-Vitamin D3 315-6.25 MG-MCG TABS Take 2 tablets by mouth     clobetasol (TEMOVATE) 0.05 % external ointment Apply topically 2 times daily     entecavir (BARACLUDE) 0.5 MG tablet Take 1 tablet (0.5 mg) by mouth daily     fluticasone-vilanterol (BREO ELLIPTA) 100-25 MCG/ACT inhaler Inhale 1 puff into the lungs daily     ibuprofen (ADVIL/MOTRIN) 600 MG tablet Take 1 tablet by mouth 4 times daily as needed     mycophenolate (GENERIC EQUIVALENT) 500 MG tablet Take 3 tablets (1,500 mg) by mouth daily Labs every 8-12 weeks.     mycophenolate (GENERIC EQUIVALENT) 500 MG tablet Take 1,500 mg by mouth     Vitamin D3 (VITAMIN D, CHOLECALCIFEROL,) 25 mcg (1000 units) tablet Take by mouth daily     No current facility-administered medications for this encounter.         Tobacco History:  reports that she has never smoked. She has never used smokeless tobacco.       REVIEW OF SYMPTOMS:   The review of systems was negative except as noted in the HPI.           PHYSICAL EXAMINATION:     /67 (BP Location: Right arm)   Pulse 78   Temp 97.9  F (36.6  C)   Ht 1.676 m (5' 6\")   Wt 71.5 kg (157 lb 9.6 oz)   BMI 25.44 kg/m             GENERAL: The patient overall appears well and is no acute distress.   HEAD: normocephalic   EYES: Sclera and conjunctiva clear   NECK: no obvious masses   LUNGS: breathing is unlabored.   EXTREMITIES: " No clubbing, cyanosis or edema   SKIN: No rashes or other abnormalities except as noted under the Wound section below.   NEUROLOGICAL: normal motor and sensory function       WOUND/ulcer: The wound appears healthy with no sign of infection.   Wound bed: granulation tissue  Periwound: healthy intact skin  The left axillary wound has a small area of undermining.  The right axillary wound is very superficial.  Neither of the axillary wounds have heaped up periwound inflammation.      Also see below for wound details:     Circumferential volume measures:             No data to display                Ulceration(s)/Wound(s):   Please see the media tab under the chart review for pictures of the wounds.  Nursing staff removed dressings and cleansed wound.    Wound (used by [facility] only) 07/11/23 1423 Left axilla (Active)   Thickness/Stage full thickness 07/11/23 1400   Base red 07/11/23 1400   Periwound intact 07/11/23 1400   Periwound Temperature warm 07/11/23 1400   Periwound Skin Turgor soft 07/11/23 1400   Edges open 07/11/23 1400   Length (cm) 2.8 07/11/23 1400   Width (cm) 1 07/11/23 1400   Depth (cm) 1.4 07/11/23 1400   Wound (cm^2) 2.8 cm^2 07/11/23 1400   Wound Volume (cm^3) 3.92 cm^3 07/11/23 1400   Undermining [Depth (cm)/Location] 1-4 o'clock/0.9 cm 07/11/23 1400   Drainage Characteristics/Odor serosanguineous 07/11/23 1400   Drainage Amount moderate 07/11/23 1400   Care, Wound non-select wound debridement performed 07/11/23 1400       Wound (used by [facility] only) 07/11/23 1425 Right axilla (Active)   Thickness/Stage full thickness 07/11/23 1400   Base pink 07/11/23 1400   Periwound intact 07/11/23 1400   Periwound Temperature warm 07/11/23 1400   Periwound Skin Turgor soft 07/11/23 1400   Edges open 07/11/23 1400   Length (cm) 0.6 07/11/23 1400   Width (cm) 0.8 07/11/23 1400   Depth (cm) 0.2 07/11/23 1400   Wound (cm^2) 0.48 cm^2 07/11/23 1400   Wound Volume (cm^3) 0.1 cm^3 07/11/23 1400   Undermining [Depth  (cm)/Location] 9-3 o'clock / 1.1cm 07/11/23 1400   Drainage Characteristics/Odor serosanguineous 07/11/23 1400   Drainage Amount moderate 07/11/23 1400   Care, Wound non-select wound debridement performed 07/11/23 1400           Recent Labs   Lab Test 06/08/23  1216 02/13/23  1356   A1C 6.2* 5.9*          Recent Labs   Lab Test 06/08/23  1216 05/17/23  0738 04/12/23  0950   ALBUMIN 4.2 4.0 4.3              No debridement performed today.                  ASSESSMENT:   This is a 52 year old  female with bilateral axillary wounds.          PLAN:   We will bandage the areas with Hydrofera Blue and a Mepilex bandage changed every other day.  I have explained to the patient that the treatment of hidradenitis is largely dependent on the dermatologist to treat the underlying disease process.  What we can do with bandages only affects the surface and plays a relatively small role in helping these wounds to heal.  I have strongly encouraged her to continue to work with the dermatologist.  I recommend that she strongly consider receiving the Humira injections.  I have also recommended that she call the dermatologist to schedule a follow-up appointment.  I have explained to the patient the importance of protein intake to wound healing.  I have explained that increasing protein intake will speed wound healing.  We discussed several types of food that are high in protein and the wound care nurse gave the patient a handout that summarizes this information.  In addition to further speed wound healing I have encouraged the patient to take a protein supplement.   The patient will return to the wound clinic in one week to see me again.        45 minutes spent on the date of the encounter doing chart review, history and exam, documentation and further activities per the note      Luis Enrique Yarbrough MD  07/11/2023   3:06 PM   [facility]  [phone]    This note was electronically signed by Luis Enrique Valdez  "Zenon BANKS      Further instructions from your care team       Angelica Jett      1970    A DME order for supplies has been placed to [facility]. If there are any issues with your order including not receiving the order please call [facility] at [phone] option 3. They can also provide a tracking number for you if you had supplies shipped to you.    Follow up with Dermatology. Call to schedule the appointment.     Wound Dressing Change: Left Axilla and Right Axilla  - Wash your hands with soap and water before you begin your dressing change and prepare a clean surface for dressings.  -Okay to go into the shower can have water run over the wounds and then dry areas well  -Apply 1/6 of 4x5\" hydrofera blue ready transfer to Left Axilla and 1/12 of 4x5\" hydrofera blue ready transfer to Right Axilla  -Cover with 1 4x4\" mepilex border dressing to each wound  -Change every other day if drainage is large change daily but then may need to order more mepilex or call us to find different product.     A diet high in protein is important for wound healing, we recommend getting 90 grams of protein per day. Taking protein shakes or bars are a good way to get extra protein in your diet.     Good sources of protein:  Pork 26g per 3 oz  Whey protein powder - 24g per scoop (on average)  Greek yogurt - 23g per 8oz   Chicken or Turkey - 23g per 3oz  Fish - 20-25g per 3oz  Beef - 18-23g per 3oz  Navy beans - 20g per cup  Cottage cheese - 14g per 1/2 cup   Lentils - 13g per 1/4 cup  Beef jerky 13g per 1oz  2% milk - 8g per cup  Peanut butter - 8g per 2 tablespoons  Eggs - 6g per egg  Mixed nuts - 6g per 2oz       Luis Enrique Yarbrough M.D. July 11, 2023    Call us at [phone] if you have any questions about your wounds, have redness or swelling around your wound, have a fever of 101 degrees Fahrenheit or greater or if you have any other problems or concerns. We answer the phone Monday through Friday 8 " am to 4 pm, please leave a message as we check the voicemail frequently throughout the day.     If you had a positive experience please indicate that on your patient satisfaction survey form that [facility] will be sending you.    It was a pleasure meeting with you today.  Thank you for allowing me and my team the privilege of caring for you today.  YOU are the reason we are here, and I truly hope we provided you with the excellent service you deserve.  Please let us know if there is anything else we can do for you so that we can be sure you are leaving completely satisfied with your care experience.      If you have any billing related questions please call the [facility] Business office at [phone]. The clinic staff does not handle billing related matters.    If you are scheduled to have a follow up appointment, you will receive a reminder call the day before your visit. On the appointment day please arrive 15 minutes prior to your appointment time. If you are unable to keep that appointment, please call the clinic to cancel or reschedule. If you are more than 10 minutes late or greater for your scheduled appointment time, the clinic policy is that you may be asked to reschedule.         ,

## 2023-07-11 NOTE — DISCHARGE INSTRUCTIONS
"Angelica Jett      1970    A DME order for supplies has been placed to [facility]. If there are any issues with your order including not receiving the order please call [facility] at [phone] option 3. They can also provide a tracking number for you if you had supplies shipped to you.    Follow up with Dermatology. Call to schedule the appointment.     Wound Dressing Change: Left Axilla and Right Axilla  - Wash your hands with soap and water before you begin your dressing change and prepare a clean surface for dressings.  -Okay to go into the shower can have water run over the wounds and then dry areas well  -Apply 1/6 of 4x5\" hydrofera blue ready transfer to Left Axilla and 1/12 of 4x5\" hydrofera blue ready transfer to Right Axilla  -Cover with 1 4x4\" mepilex border dressing to each wound  -Change every other day if drainage is large change daily but then may need to order more mepilex or call us to find different product.     A diet high in protein is important for wound healing, we recommend getting 90 grams of protein per day. Taking protein shakes or bars are a good way to get extra protein in your diet.     Good sources of protein:  Pork 26g per 3 oz  Whey protein powder - 24g per scoop (on average)  Greek yogurt - 23g per 8oz   Chicken or Turkey - 23g per 3oz  Fish - 20-25g per 3oz  Beef - 18-23g per 3oz  Navy beans - 20g per cup  Cottage cheese - 14g per 1/2 cup   Lentils - 13g per 1/4 cup  Beef jerky 13g per 1oz  2% milk - 8g per cup  Peanut butter - 8g per 2 tablespoons  Eggs - 6g per egg  Mixed nuts - 6g per 2oz       Luis Enrique Yarbrough M.D. July 11, 2023    Call us at [phone] if you have any questions about your wounds, have redness or swelling around your wound, have a fever of 101 degrees Fahrenheit or greater or if you have any other problems or concerns. We answer the phone Monday through Friday 8 am to 4 pm, please leave a message as we check the voicemail " frequently throughout the day.     If you had a positive experience please indicate that on your patient satisfaction survey form that [facility] will be sending you.    It was a pleasure meeting with you today.  Thank you for allowing me and my team the privilege of caring for you today.  YOU are the reason we are here, and I truly hope we provided you with the excellent service you deserve.  Please let us know if there is anything else we can do for you so that we can be sure you are leaving completely satisfied with your care experience.      If you have any billing related questions please call the [facility] Business office at [phone]. The clinic staff does not handle billing related matters.    If you are scheduled to have a follow up appointment, you will receive a reminder call the day before your visit. On the appointment day please arrive 15 minutes prior to your appointment time. If you are unable to keep that appointment, please call the clinic to cancel or reschedule. If you are more than 10 minutes late or greater for your scheduled appointment time, the clinic policy is that you may be asked to reschedule.

## 2023-07-19 ENCOUNTER — TELEPHONE (OUTPATIENT)
Dept: DERMATOLOGY | Facility: CLINIC | Age: 53
End: 2023-07-19
Payer: COMMERCIAL

## 2023-07-19 NOTE — TELEPHONE ENCOUNTER
[facility] Call Center    Phone Message    May a detailed message be left on voicemail: yes     Reason for Call: Other: Patient is upset because Dr Sotomayor cancelled her appt 08/10/2023 and next avail is Oct 26th. Patient has a large wound and was told by wound care that she needs to see Dr Sotomayor for treatment. Patient wants to be seen sooner than 10/26/2023. Please call back [phone] Thank you     Action Taken: Message routed to:  [facility] ([facility] Derm Rheum    Travel Screening: Not Applicable

## 2023-07-20 NOTE — TELEPHONE ENCOUNTER
Pt rescheduled.   General Sunscreen Counseling: I recommended a broad spectrum sunscreen with a SPF of 30 or higher.  I explained that SPF 30 sunscreens block approximately 97 percent of the sun's harmful rays.  Sunscreens should be applied at least 15 minutes prior to expected sun exposure and then every 2 hours after that as long as sun exposure continues. If swimming or exercising sunscreen should be reapplied every 45 minutes to an hour after getting wet or sweating.  One ounce, or the equivalent of a shot glass full of sunscreen, is adequate to protect the skin not covered by a bathing suit. I also recommended a lip balm with a sunscreen as well. Sun protective clothing can be used in lieu of sunscreen but must be worn the entire time you are exposed to the sun's rays. Detail Level: Generalized Products Recommended: Sun Protective Clothing.    Full spectrum. Sunscreen

## 2023-08-01 ENCOUNTER — HOSPITAL ENCOUNTER (OUTPATIENT)
Dept: WOUND CARE | Facility: CLINIC | Age: 53
Discharge: HOME | End: 2023-08-01
Attending: FAMILY MEDICINE
Payer: COMMERCIAL

## 2023-08-01 VITALS — DIASTOLIC BLOOD PRESSURE: 59 MMHG | TEMPERATURE: 96.7 F | SYSTOLIC BLOOD PRESSURE: 100 MMHG | HEART RATE: 78 BPM

## 2023-08-01 DIAGNOSIS — L73.2 HIDRADENITIS AXILLARIS: Primary | ICD-10-CM

## 2023-08-01 DIAGNOSIS — S41.101D: ICD-10-CM

## 2023-08-01 DIAGNOSIS — S41.102D WOUND, OPEN AXILLA, LEFT, SUBSEQUENT ENCOUNTER: ICD-10-CM

## 2023-08-01 PROBLEM — K58.8 OTHER IRRITABLE BOWEL SYNDROME: Chronic | Status: ACTIVE | Noted: 2021-03-26

## 2023-08-01 PROBLEM — D12.6 BENIGN TUBULAR ADENOMA OF LARGE INTESTINE: Chronic | Status: ACTIVE | Noted: 2021-06-04

## 2023-08-01 PROCEDURE — 99214 OFFICE O/P EST MOD 30 MIN: CPT | Performed by: FAMILY MEDICINE

## 2023-08-01 NOTE — PROGRESS NOTES
Wound Clinic Note          Visit date: 08/01/2023       Cheif Complaint:     Angelica Jett is a 53 year old  female had concerns including WOUND CARE.  She has bilateral axillary wounds due to hidradenitis suppurativa.      HISTORY OF PRESENT ILLNESS:    Angelica Jett reports the ulcer has been present since March 2023.  The wound began without a clear cause.   Prior to March 2023 she had not had any other difficult to heal wounds in the axilla, groins or buttock area.  She has been referred to a dermatologist, Dr. Sotomayor, who she had a virtual visit with on June 22, 2023.  He had recommended she continue on antibiotics which she continues on now with no symptoms of an adverse reaction.  She is also been applying a clobetasol cream to the periwound areas.  He had also recommended that he apply to the insurance company for approval to use Humira.  However the patient chose to hold off on pursuing that option for now.  According to the note from the dermatologist the patient was expected to follow-up with the dermatologist again in 8 weeks.  She is now scheduled to see her dermatologist again on September 7, 2023.    Since her last clinic visit with me she has been bandaging the wounds with Hydrofera Blue and a Mepilex bandage changed every other day.  She reports has been little to no drainage from the right axillary wound recently and the wound is nearly healed.  She believes the left axillary wound is smaller but there still has been light serous drainage from this area.        The pateint denies fevers or chills.  They report the pain from the wound has been 0/10 and has remained about the same recently.      Today the patient reports maintaining a high protein diet and taking protein supplements regularly.        She does not have diabetes and does not smoke cigarettes.  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        The patient has recently had some symptoms of wound infection and  is currently taking antibiotics which they have been tolerating well with no symptoms of an adverse reaction.       Problem List:   Past Medical History:   Diagnosis Date    Diastolic dysfunction 2/24/2014              Family Hx: family history is not on file.       Surgical Hx:   Past Surgical History:   Procedure Laterality Date    BIOPSY  2005    COLONOSCOPY  05/28/2021          Allergies:    Allergies   Allergen Reactions    Iodine Hives, Rash and Other (See Comments)     Patient reports this allergy is because she is allergic to shrimp      Shellfish-Derived Products Difficulty breathing and Hives     Shrimp      Tetanus Toxoids               Medication History:    Current Outpatient Medications   Medication Sig    Calcium Citrate-Vitamin D3 315-6.25 MG-MCG TABS Take 2 tablets by mouth    clobetasol (TEMOVATE) 0.05 % external ointment Apply topically 2 times daily    entecavir (BARACLUDE) 0.5 MG tablet Take 1 tablet (0.5 mg) by mouth daily    fluticasone-vilanterol (BREO ELLIPTA) 100-25 MCG/ACT inhaler Inhale 1 puff into the lungs daily    ibuprofen (ADVIL/MOTRIN) 600 MG tablet Take 1 tablet by mouth 4 times daily as needed    mycophenolate (GENERIC EQUIVALENT) 500 MG tablet Take 3 tablets (1,500 mg) by mouth daily Labs every 8-12 weeks.    mycophenolate (GENERIC EQUIVALENT) 500 MG tablet Take 1,500 mg by mouth    Vitamin D3 (VITAMIN D, CHOLECALCIFEROL,) 25 mcg (1000 units) tablet Take by mouth daily     No current facility-administered medications for this encounter.         Tobacco History:  reports that she has never smoked. She has never used smokeless tobacco.       REVIEW OF SYMPTOMS:   The review of systems was negative except as noted in the HPI.           PHYSICAL EXAMINATION:     /59 (BP Location: Left arm, Patient Position: Sitting)   Pulse 78   Temp (!) 96.7  F (35.9  C) (Temporal)            GENERAL: The patient overall appears well and is no acute distress.   HEAD: normocephalic   EYES:  Sclera and conjunctiva clear   NECK: no obvious masses   LUNGS: breathing is unlabored.   EXTREMITIES: No clubbing, cyanosis or edema   SKIN: No rashes or other abnormalities except as noted under the Wound section below.   NEUROLOGICAL: normal motor and sensory function       WOUND/ulcer: The wound appears healthy with no sign of infection.   Wound bed: granulation tissue  Periwound: healthy intact skin  There is just a pinpoint open area remaining at the right axilla.  The left axillary wound is quite a bit smaller, based on my examination.      Also see below for wound details:     Circumferential volume measures:             No data to display                Ulceration(s)/Wound(s):   Please see the media tab under the chart review for pictures of the wounds.  Nursing staff removed dressings and cleansed wound.    Wound (used by OP [facility] only) 07/11/23 1423 Left axilla (Active)   Thickness/Stage full thickness 08/01/23 0928   Base red 08/01/23 0928   Periwound intact 08/01/23 0928   Periwound Temperature warm 08/01/23 0928   Periwound Skin Turgor soft 08/01/23 0928   Edges open 08/01/23 0928   Length (cm) 2.2 08/01/23 0928   Width (cm) 1.6 08/01/23 0928   Depth (cm) 1.6 08/01/23 0928   Wound (cm^2) 3.52 cm^2 08/01/23 0928   Wound Volume (cm^3) 5.63 cm^3 08/01/23 0928   Wound healing % -25.71 08/01/23 0928   Undermining [Depth (cm)/Location] 1cm/1-4 o'clock 08/01/23 0928   Drainage Characteristics/Odor serosanguineous 08/01/23 0928   Drainage Amount moderate 08/01/23 0928   Care, Wound debrided 08/01/23 0928       Wound (used by [facility] only) 07/11/23 1425 Right axilla (Active)   Thickness/Stage full thickness 08/01/23 0928   Base pink 08/01/23 0928   Periwound intact 08/01/23 0928   Periwound Temperature warm 08/01/23 0928   Periwound Skin Turgor soft 08/01/23 0928   Edges open 08/01/23 0928   Length (cm) 0.2 08/01/23 0928   Width (cm) 0.2 08/01/23 0928   Depth (cm) 0.2 08/01/23 0928   Wound (cm^2) 0.04 cm^2  08/01/23 0928   Wound Volume (cm^3) 0.01 cm^3 08/01/23 0928   Wound healing % 91.67 08/01/23 0928   Drainage Characteristics/Odor serosanguineous 08/01/23 0928   Drainage Amount moderate 08/01/23 0928   Care, Wound non-select wound debridement performed 08/01/23 0928             Recent Labs   Lab Test 06/08/23  1216 02/13/23  1356   A1C 6.2* 5.9*          Recent Labs   Lab Test 06/08/23  1216 05/17/23  0738 04/12/23  0950   ALBUMIN 4.2 4.0 4.3              No debridement performed today.                  ASSESSMENT:   This is a 53 year old  female with bilateral axillary wounds.          PLAN:   We will bandage the areas with Hydrofera Blue and a Mepilex bandage changed every other day.  I have again explained to the patient that the treatment of hidradenitis is largely dependent on the dermatologist to treat the underlying disease process.  I have encouraged her to continue to work with her dermatologist.  I have encouraged the patient to continue on their high protein diet to aid in wound healing.   The patient will return to the wound clinic in 2 to 3 weeks to see me again.        30 minutes spent on the date of the encounter doing chart review, history and exam, documentation and further activities per the note      Luis Enrique Yarbrough MD  08/01/2023   10:08 AM   [facility]/Wound  [phone]    This note was electronically signed by Luis Enrique Yarbrough MD        Further instructions from your care team         Angelica Jett      1970    A DME order for supplies has been placed to [facility]. If there are any issues with your order including not receiving the order please call [facility] at [phone] option 3. They can also provide a tracking number for you if you had supplies shipped to you.     Follow up with Dermatology. Call to schedule the appointment.      Wound Dressing Change: Left Axilla and Right Axilla  - Wash your hands with soap and water before  "you begin your dressing change and prepare a clean surface for dressings.  - Okay to go into the shower can have water run over the wounds. Wash with mild soap and water, then dry areas well  - Apply clobetasol to skin around wound as directed by dermatologist  - Pack into wound 1/8 of 4x5\" Hydrofera Blue Ready-Transfer to Left Axilla only  - Cover with 1 4x4\" Cutimed Sorbian Border for each wound  Change daily      Protein: A diet high in protein is important for wound healing, we recommend getting up to 90 grams of protein per day. Taking protein shakes or bars are a good way to get extra protein in your diet.     Luis Enrique Yarbrough M.D. August 1, 2023    Call us at [phone] if you have any questions about your wounds, have redness or swelling around your wound, have a fever of 101 degrees Fahrenheit or greater or if you have any other problems or concerns. We answer the phone Monday through Friday 8 am to 4 pm, please leave a message as we check the voicemail frequently throughout the day.     If you had a positive experience please indicate that on your patient satisfaction survey form that [facility] will be sending you.    It was a pleasure meeting with you today.  Thank you for allowing me and my team the privilege of caring for you today.  YOU are the reason we are here, and I truly hope we provided you with the excellent service you deserve.  Please let us know if there is anything else we can do for you so that we can be sure you are leaving completely satisfied with your care experience.      If you have any billing related questions please call the [facility] Business office at [phone]. The clinic staff does not handle billing related matters.    If you are scheduled to have a follow up appointment, you will receive a reminder call the day before your visit. On the appointment day please arrive 15 minutes prior to your appointment time. If you are unable to keep that appointment, please " call the clinic to cancel or reschedule. If you are more than 10 minutes late or greater for your scheduled appointment time, the clinic policy is that you may be asked to reschedule.        ,

## 2023-08-01 NOTE — DISCHARGE INSTRUCTIONS
"Angelica Jett      1970    A DME order for supplies has been placed to [facility]. If there are any issues with your order including not receiving the order please call [facility] at [phone] option 3. They can also provide a tracking number for you if you had supplies shipped to you.     Follow up with Dermatology. Call to schedule the appointment.      Wound Dressing Change: Left Axilla and Right Axilla  - Wash your hands with soap and water before you begin your dressing change and prepare a clean surface for dressings.  - Okay to go into the shower can have water run over the wounds. Wash with mild soap and water, then dry areas well  - Apply clobetasol to skin around wound as directed by dermatologist  - Pack into wound 1/8 of 4x5\" Hydrofera Blue Ready-Transfer to Left Axilla only  - Cover with 1 4x4\" Cutimed Sorbian Border for each wound  Change daily      Protein: A diet high in protein is important for wound healing, we recommend getting up to 90 grams of protein per day. Taking protein shakes or bars are a good way to get extra protein in your diet.     Luis Enrique Yarbrough M.D. August 1, 2023    Call us at [phone] if you have any questions about your wounds, have redness or swelling around your wound, have a fever of 101 degrees Fahrenheit or greater or if you have any other problems or concerns. We answer the phone Monday through Friday 8 am to 4 pm, please leave a message as we check the voicemail frequently throughout the day.     If you had a positive experience please indicate that on your patient satisfaction survey form that [facility] will be sending you.    It was a pleasure meeting with you today.  Thank you for allowing me and my team the privilege of caring for you today.  YOU are the reason we are here, and I truly hope we provided you with the excellent service you deserve.  Please let us know if there is anything else we can do for you so that we " can be sure you are leaving completely satisfied with your care experience.      If you have any billing related questions please call the [facility] Business office at [phone]. The clinic staff does not handle billing related matters.    If you are scheduled to have a follow up appointment, you will receive a reminder call the day before your visit. On the appointment day please arrive 15 minutes prior to your appointment time. If you are unable to keep that appointment, please call the clinic to cancel or reschedule. If you are more than 10 minutes late or greater for your scheduled appointment time, the clinic policy is that you may be asked to reschedule.

## 2023-08-01 NOTE — ADDENDUM NOTE
Encounter addended by: Oneyda Ng RN on: 8/1/2023 10:23 AM   Actions taken: Diagnosis association updated, Order list changed

## 2023-08-18 ENCOUNTER — LAB (OUTPATIENT)
Dept: LAB | Facility: CLINIC | Age: 53
End: 2023-08-18
Payer: COMMERCIAL

## 2023-08-18 ENCOUNTER — OFFICE VISIT (OUTPATIENT)
Dept: PULMONOLOGY | Facility: CLINIC | Age: 53
End: 2023-08-18
Attending: INTERNAL MEDICINE
Payer: COMMERCIAL

## 2023-08-18 VITALS — OXYGEN SATURATION: 96 % | DIASTOLIC BLOOD PRESSURE: 68 MMHG | SYSTOLIC BLOOD PRESSURE: 108 MMHG | HEART RATE: 77 BPM

## 2023-08-18 DIAGNOSIS — J84.9 ILD (INTERSTITIAL LUNG DISEASE) (H): ICD-10-CM

## 2023-08-18 DIAGNOSIS — J84.9 ILD (INTERSTITIAL LUNG DISEASE) (H): Primary | ICD-10-CM

## 2023-08-18 LAB
6 MIN WALK (FT): 1225 FT
6 MIN WALK (M): 373 M
ANION GAP SERPL CALCULATED.3IONS-SCNC: 10 MMOL/L (ref 7–15)
BUN SERPL-MCNC: 22.2 MG/DL (ref 6–20)
CALCIUM SERPL-MCNC: 9.8 MG/DL (ref 8.6–10)
CHLORIDE SERPL-SCNC: 102 MMOL/L (ref 98–107)
CREAT SERPL-MCNC: 1 MG/DL (ref 0.51–0.95)
CRP SERPL-MCNC: 5.28 MG/L
DEPRECATED HCO3 PLAS-SCNC: 25 MMOL/L (ref 22–29)
DLCOUNC-%PRED-PRE: 49 %
DLCOUNC-PRE: 10.65 ML/MIN/MMHG
DLCOUNC-PRED: 21.3 ML/MIN/MMHG
ERV-%PRED-PRE: 64 %
ERV-PRE: 0.67 L
ERV-PRED: 1.04 L
EXPTIME-PRE: 5.06 SEC
FEF2575-%PRED-PRE: 79 %
FEF2575-PRE: 2 L/SEC
FEF2575-PRED: 2.52 L/SEC
FEFMAX-%PRED-PRE: 80 %
FEFMAX-PRE: 5.55 L/SEC
FEFMAX-PRED: 6.9 L/SEC
FEV1-%PRED-PRE: 59 %
FEV1-PRE: 1.57 L
FEV1FEV6-PRE: 86 %
FEV1FEV6-PRED: 82 %
FEV1FVC-PRE: 86 %
FEV1FVC-PRED: 81 %
FEV1SVC-PRE: 83 %
FEV1SVC-PRED: 69 %
FIFMAX-PRE: 4.56 L/SEC
FRCPLETH-%PRED-PRE: 71 %
FRCPLETH-PRE: 2.08 L
FRCPLETH-PRED: 2.9 L
FVC-%PRED-PRE: 54 %
FVC-PRE: 1.81 L
FVC-PRED: 3.3 L
GFR SERPL CREATININE-BSD FRML MDRD: 67 ML/MIN/1.73M2
GLUCOSE SERPL-MCNC: 95 MG/DL (ref 70–99)
IC-%PRED-PRE: 44 %
IC-PRE: 1.21 L
IC-PRED: 2.7 L
POTASSIUM SERPL-SCNC: 4.4 MMOL/L (ref 3.4–5.3)
RVPLETH-%PRED-PRE: 82 %
RVPLETH-PRE: 1.4 L
RVPLETH-PRED: 1.7 L
SODIUM SERPL-SCNC: 137 MMOL/L (ref 136–145)
TLCPLETH-%PRED-PRE: 59 %
TLCPLETH-PRE: 3.29 L
TLCPLETH-PRED: 5.54 L
VA-%PRED-PRE: 47 %
VA-PRE: 2.4 L
VC-%PRED-PRE: 49 %
VC-PRE: 1.89 L
VC-PRED: 3.84 L

## 2023-08-18 PROCEDURE — 99214 OFFICE O/P EST MOD 30 MIN: CPT | Mod: 25 | Performed by: INTERNAL MEDICINE

## 2023-08-18 PROCEDURE — 36415 COLL VENOUS BLD VENIPUNCTURE: CPT | Performed by: PATHOLOGY

## 2023-08-18 PROCEDURE — 94729 DIFFUSING CAPACITY: CPT | Performed by: INTERNAL MEDICINE

## 2023-08-18 PROCEDURE — 99000 SPECIMEN HANDLING OFFICE-LAB: CPT | Performed by: PATHOLOGY

## 2023-08-18 PROCEDURE — 80048 BASIC METABOLIC PNL TOTAL CA: CPT | Performed by: PATHOLOGY

## 2023-08-18 PROCEDURE — G0463 HOSPITAL OUTPT CLINIC VISIT: HCPCS | Performed by: INTERNAL MEDICINE

## 2023-08-18 PROCEDURE — 94726 PLETHYSMOGRAPHY LUNG VOLUMES: CPT | Performed by: INTERNAL MEDICINE

## 2023-08-18 PROCEDURE — 86140 C-REACTIVE PROTEIN: CPT | Performed by: PATHOLOGY

## 2023-08-18 PROCEDURE — 82085 ASSAY OF ALDOLASE: CPT | Mod: 90 | Performed by: PATHOLOGY

## 2023-08-18 PROCEDURE — 94618 PULMONARY STRESS TESTING: CPT | Performed by: INTERNAL MEDICINE

## 2023-08-18 PROCEDURE — 94375 RESPIRATORY FLOW VOLUME LOOP: CPT | Performed by: INTERNAL MEDICINE

## 2023-08-18 ASSESSMENT — PAIN SCALES - GENERAL: PAINLEVEL: NO PAIN (0)

## 2023-08-18 NOTE — PATIENT INSTRUCTIONS
Video for pulmonary rehab:  Please follow the instructions on this video to the best you can, repeat all exercises 4 times per week.     [email or web address]/[email or web address]    Continue current dose cellcept   We support starting humira for HS skin disease   We will rediscuss Ofev next visit, please find attached educational materials, it should be safe with hepatitis while on treatment     Please call our direct ILD nurses line for questions and concerns: [phone]    For scheduling, please call: [phone]

## 2023-08-18 NOTE — PROGRESS NOTES
[facility]    Reason for Visit  Angelica Jett is a 52 year old year old female who is being seen for known polymyositis related ILD.   HPI  Patient is 52 years old female [place] was in usual state of health until she delivered a healthy child back in 2005 and developed interstitial lung disease and associated muscle pain.  At the time, she reportedly had a lung biopsy in [place] which in association with polymyositis clinical diagnosis [reportedly had thigh MRI], received a diagnosis of ILD associated with polymyositis.  Her main immunosuppressive regimen has been mycophenolate mofetil, which has been decreased back in May 2022 to 1500 mg/day which appears to be due to PFT stability and symptom improvement.  Earlier in the disease, lung transplant was considered as she was needing oxygen, but as she responded to immunosuppression lung transplant has been deferred.  She has also been receiving pulmonary rehab with significant improvement in symptoms.  She admits to some interruption in her mycophenolate and with that her symptoms worsened back in 2022.  She also has known hepatitis B receiving Entecavir with normal liver function as of late 2022.  She works as a  and has no occupational exposures.  She is a lifetime non-smoker and denies secondhand smoke exposure.  She denies triggers for hypersensitivity pneumonitis.  She also had pulmonary hypertension (2013) and is needed Adcirca and revatio, but they were discontinued based on improvement in her pulmonary pressures on right heart cath 2019.    Updates from today 8/18/23  Patient returns for follow up. Has seen rheumatology and dermatology, her most active problem now is Hidradenitis suppurativa with ulceration, received bactrim with no improvement, has been seeing wound clinic and her wound is starting to heal. She was offered humira and we supported this decision but she remains  very reluctant to start it, concerned about pulmonary side effects. Her level of activity is unchanged over the last 6 months. She completed pulmonary rheb and feels benefit. She uses O2 at 2 LPM with exercise. She is currently seeking a job (finance).     Vitals: /68 (BP Location: Right arm, Patient Position: Sitting, Cuff Size: Adult Regular)   Pulse 77   SpO2 96%         Exam:   GENERAL APPEARANCE: Well developed, well nourished, alert, and in no apparent distress.  HEENT: Normal oral mucus membranes, tongue and dentition. Normal nose.   RESP: good air flow throughout.  Fine inspiratory rales more pronounced in the bases.  CV: Normal S1, S2, regular rhythm, normal rate. No murmur.  No LE edema.   MS: extremities normal. No clubbing. No cyanosis.  SKIN: no rash on limited exam.  NEURO: Mentation intact, speech normal, normal gait and stance.  PSYCH: mentation appears normal. and affect normal/bright.    Results:    Chest imaging:  Reviewed high-resolution CT chest images from today 2/1/2023 with the patient and compared to May 2021  Unchanged typical UIP pattern with extensive honeycombing in both lower lobes, and subpleural distribution tracking up the upper lobes.  The CT chest from May 2021 did show mild diffuse groundglass opacities, favoring pulmonary edema at the time.      PFTs 8/18/23        My interpretation: Stable Moderate to severe intrathoracic restrictive disease    PFTs from outside May 2022  FVC 1.91  FEV1 1.61  FEV1/FVC 84  TLC 2.98  DLCO 7.74    PFTs from outside May 2021  FVC 1.86  FEV1 1.51  FEV1/FVC 81  TLC 3.22  DLCO 5.58    PFTs from outside May 2019  FEV1 1.51-59%, FVC 1.92-61%  TLC 2.15-41%  DLCO 31%    Six mins walk on 2 LPM, no desaturation below 89%:      Echo from outside showed mild RV dysfunction and pulmonary artery systolic pressure 20-25  She has history of pulmonary arterial hypertension that followed pregnancy 2005, however a repeat right heart cath in 2019 showed no  residual pulm hypertension    Right heart cath May 2018  RA 3  RV 35/8  PA 36/15 [23] from a previous mean high of 47 in 2011, but PCWP was 27 at the time  PCWP 11  PVR 2.2    Previous pulmonary hypertension therapies  Adcirca and revatio to 2013    Assessment and plan:   ILD in polymyositis  Patient is 53 years old female with known history of polymyositis since 2005 following pregnancy, and lung biopsy at the time with established UIP pattern.  The patient has moved in from [place] in 2022.  Back in May 2022, her local pulmonologist at the time reduced her mycophenolate to 1000 mg in the morning and 500 in the evening, which appears to be due to PFT stability and symptomatic improvement. She does not recall having side effect from mycophenolate. DLCO continues to be stable today. She used to need oxygen at 4 L/min with activity, however she did not need O2 on six mins walk 6 months ago, and today requiring 2 LPM towards the end of the test. I also see marked improvement in DLCO followed by stability, which could be a combination effect of compliance with MMF and pulmonary rehab. She has completed pulmonary rehab recently. For UIP, we checked ANCA and anti-CCP and both were negative. We re-discussed Ofev today and she remains very reluctant given her hepatitis B infection, which is understandable. Her UIP might be stable for now but it can progress in the future.    Plan:  - Continue current MMF dose (1500 mg/d)  - MMF monitoring labs today   - Reconsider Ofev next visit   - Pulmonary rehab video link provided    - PFTs and six mins walk in 6 months, and if starting humira, need to come back in 3 months with PFTs.   - Follow up with rheumatology     2. Hepatitis B on entecavir  Continue follow up with hepatology     3. Previous secondary PAH, revatio and adcirc discontinued in 2019 based on improvements in RHC      4. Immunization  Up to date on vaccines     Return in 6 months

## 2023-08-18 NOTE — LETTER
8/18/2023         RE: Angelica Jett  [address]  [place] [address]        Dear Colleague,    Thank you for referring your patient, Angelica Jett, to the [facility] AND [facility] [place]. Please see a copy of my visit note below.    [facility]    Reason for Visit  Angelica Jett is a 52 year old year old female who is being seen for known polymyositis related ILD.   HPI  Patient is 52 years old female [place] was in usual state of health until she delivered a healthy child back in 2005 and developed interstitial lung disease and associated muscle pain.  At the time, she reportedly had a lung biopsy in [place] which in association with polymyositis clinical diagnosis [reportedly had thigh MRI], received a diagnosis of ILD associated with polymyositis.  Her main immunosuppressive regimen has been mycophenolate mofetil, which has been decreased back in May 2022 to 1500 mg/day which appears to be due to PFT stability and symptom improvement.  Earlier in the disease, lung transplant was considered as she was needing oxygen, but as she responded to immunosuppression lung transplant has been deferred.  She has also been receiving pulmonary rehab with significant improvement in symptoms.  She admits to some interruption in her mycophenolate and with that her symptoms worsened back in 2022.  She also has known hepatitis B receiving Entecavir with normal liver function as of late 2022.  She works as a  and has no occupational exposures.  She is a lifetime non-smoker and denies secondhand smoke exposure.  She denies triggers for hypersensitivity pneumonitis.  She also had pulmonary hypertension (2013) and is needed Adcirca and revatio, but they were discontinued based on improvement in her pulmonary pressures on right heart cath 2019.    Updates from today 8/18/23  Patient returns for follow  up. Has seen rheumatology and dermatology, her most active problem now is Hidradenitis suppurativa with ulceration, received bactrim with no improvement, has been seeing wound clinic and her wound is starting to heal. She was offered humira and we supported this decision but she remains very reluctant to start it, concerned about pulmonary side effects. Her level of activity is unchanged over the last 6 months. She completed pulmonary rheb and feels benefit. She uses O2 at 2 LPM with exercise. She is currently seeking a job (finance).     Vitals: /68 (BP Location: Right arm, Patient Position: Sitting, Cuff Size: Adult Regular)   Pulse 77   SpO2 96%         Exam:   GENERAL APPEARANCE: Well developed, well nourished, alert, and in no apparent distress.  HEENT: Normal oral mucus membranes, tongue and dentition. Normal nose.   RESP: good air flow throughout.  Fine inspiratory rales more pronounced in the bases.  CV: Normal S1, S2, regular rhythm, normal rate. No murmur.  No LE edema.   MS: extremities normal. No clubbing. No cyanosis.  SKIN: no rash on limited exam.  NEURO: Mentation intact, speech normal, normal gait and stance.  PSYCH: mentation appears normal. and affect normal/bright.    Results:    Chest imaging:  Reviewed high-resolution CT chest images from today 2/1/2023 with the patient and compared to May 2021  Unchanged typical UIP pattern with extensive honeycombing in both lower lobes, and subpleural distribution tracking up the upper lobes.  The CT chest from May 2021 did show mild diffuse groundglass opacities, favoring pulmonary edema at the time.      PFTs 8/18/23        My interpretation: Stable Moderate to severe intrathoracic restrictive disease    PFTs from outside May 2022  FVC 1.91  FEV1 1.61  FEV1/FVC 84  TLC 2.98  DLCO 7.74    PFTs from outside May 2021  FVC 1.86  FEV1 1.51  FEV1/FVC 81  TLC 3.22  DLCO 5.58    PFTs from outside May 2019  FEV1 1.51-59%, FVC 1.92-61%  TLC 2.15-41%  DLCO  31%    Six mins walk on 2 LPM, no desaturation below 89%:      Echo from outside showed mild RV dysfunction and pulmonary artery systolic pressure 20-25  She has history of pulmonary arterial hypertension that followed pregnancy 2005, however a repeat right heart cath in 2019 showed no residual pulm hypertension    Right heart cath May 2018  RA 3  RV 35/8  PA 36/15 [23] from a previous mean high of 47 in 2011, but PCWP was 27 at the time  PCWP 11  PVR 2.2    Previous pulmonary hypertension therapies  Adcirca and revatio to 2013    Assessment and plan:   ILD in polymyositis  Patient is 53 years old female with known history of polymyositis since 2005 following pregnancy, and lung biopsy at the time with established UIP pattern.  The patient has moved in from [place] in 2022.  Back in May 2022, her local pulmonologist at the time reduced her mycophenolate to 1000 mg in the morning and 500 in the evening, which appears to be due to PFT stability and symptomatic improvement. She does not recall having side effect from mycophenolate. DLCO continues to be stable today. She used to need oxygen at 4 L/min with activity, however she did not need O2 on six mins walk 6 months ago, and today requiring 2 LPM towards the end of the test. I also see marked improvement in DLCO followed by stability, which could be a combination effect of compliance with MMF and pulmonary rehab. She has completed pulmonary rehab recently. For UIP, we checked ANCA and anti-CCP and both were negative. We re-discussed Ofev today and she remains very reluctant given her hepatitis B infection, which is understandable. Her UIP might be stable for now but it can progress in the future.    Plan:  - Continue current MMF dose (1500 mg/d)  - MMF monitoring labs today   - Reconsider Ofev next visit   - Pulmonary rehab video link provided    - PFTs and six mins walk in 6 months, and if starting humira, need to come back in 3 months with PFTs.   - Follow up with  rheumatology     2. Hepatitis B on entecavir  Continue follow up with hepatology     3. Previous secondary PAH, revatio and adcirc discontinued in 2019 based on improvements in RHC      4. Immunization  Up to date on vaccines     Return in 6 months     Sincerely,        Garrison Bustillo MD

## 2023-08-18 NOTE — NURSING NOTE
Chief Complaint   Patient presents with    Interstitial Lung Disease (ILD)     ILD Follow up      Vitals were taken and medications were reconciled.     Senait Kennedy RMA  8:59 AM

## 2023-08-19 DIAGNOSIS — J84.9 ILD (INTERSTITIAL LUNG DISEASE) (H): ICD-10-CM

## 2023-08-19 LAB — ALDOLASE SERPL-CCNC: 5.8 U/L

## 2023-08-22 ENCOUNTER — TELEPHONE (OUTPATIENT)
Dept: PULMONOLOGY | Facility: CLINIC | Age: 53
End: 2023-08-22

## 2023-08-22 ENCOUNTER — HOSPITAL ENCOUNTER (OUTPATIENT)
Dept: WOUND CARE | Facility: CLINIC | Age: 53
Discharge: HOME | End: 2023-08-22
Attending: FAMILY MEDICINE | Admitting: FAMILY MEDICINE
Payer: COMMERCIAL

## 2023-08-22 VITALS
HEART RATE: 75 BPM | RESPIRATION RATE: 20 BRPM | TEMPERATURE: 96.8 F | SYSTOLIC BLOOD PRESSURE: 114 MMHG | DIASTOLIC BLOOD PRESSURE: 71 MMHG

## 2023-08-22 DIAGNOSIS — S41.102D WOUND, OPEN AXILLA, LEFT, SUBSEQUENT ENCOUNTER: ICD-10-CM

## 2023-08-22 DIAGNOSIS — L98.492 SKIN ULCER OF AXILLA WITH FAT LAYER EXPOSED (H): ICD-10-CM

## 2023-08-22 DIAGNOSIS — S41.101D: Primary | ICD-10-CM

## 2023-08-22 PROCEDURE — 99214 OFFICE O/P EST MOD 30 MIN: CPT | Performed by: FAMILY MEDICINE

## 2023-08-22 PROCEDURE — 97602 WOUND(S) CARE NON-SELECTIVE: CPT

## 2023-08-22 RX ORDER — FLUTICASONE FUROATE AND VILANTEROL TRIFENATATE 100; 25 UG/1; UG/1
1 POWDER RESPIRATORY (INHALATION) DAILY
Qty: 1 EACH | Refills: 3 | Status: SHIPPED | OUTPATIENT
Start: 2023-08-22

## 2023-08-22 RX ORDER — SULFAMETHOXAZOLE/TRIMETHOPRIM 800-160 MG
1 TABLET ORAL
COMMUNITY
Start: 2023-08-13 | End: 2023-09-07

## 2023-08-22 NOTE — TELEPHONE ENCOUNTER
Per pt she would like to continue medication. She will trial to see if she notices if missed dose affects her and update us or provider at next visit. Rx refilled.   Marielos Arnold RN

## 2023-08-22 NOTE — PROGRESS NOTES
Wound Clinic Note          Visit date: 08/22/2023       Che Complaint:     Angelica Jett is a 53 year old  female had concerns including WOUND CARE.  She has bilateral axillary wounds due to hidradenitis suppurativa.      HISTORY OF PRESENT ILLNESS:    Angelica Jett reports the ulcer has been present since March 2023.  The wound began without a clear cause.   Prior to March 2023 she had not had any other difficult to heal wounds in the axilla, groins or buttock area.  She has been referred to a dermatologist, Dr. Sotomayor, who she had a virtual visit with on June 22, 2023.  He had recommended she continue on antibiotics which she continues on now with no symptoms of an adverse reaction.  She is also been applying a clobetasol cream to the periwound areas.  He had also recommended that he apply to the insurance company for approval to use Humira.  However the patient chose to hold off on pursuing that option for now.  She is now scheduled to see her dermatologist again on September 7, 2023.    Since her last clinic visit with me she has been bandaging the wounds with Hydrofera Blue and a Mepilex bandage changed every other day.  She reports has been little to no drainage from the right axillary wound recently and the wound is nearly healed.          The pateint denies fevers or chills.  They report the pain from the wound has been 0/10 and has remained about the same recently.      Today the patient reports maintaining a high protein diet and taking protein supplements regularly.        She does not have diabetes and does not smoke cigarettes.  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        The patient has recently had some symptoms of wound infection and is currently taking antibiotics which they have been tolerating well with no symptoms of an adverse reaction.       Problem List:   Past Medical History:   Diagnosis Date    Diastolic dysfunction 2/24/2014              Family Hx: family  history is not on file.       Surgical Hx:   Past Surgical History:   Procedure Laterality Date    BIOPSY  2005    COLONOSCOPY  05/28/2021          Allergies:    Allergies   Allergen Reactions    Iodine Hives, Rash and Other (See Comments)     Patient reports this allergy is because she is allergic to shrimp      Shellfish-Derived Products Difficulty breathing and Hives     Shrimp      Tetanus Toxoids               Medication History:    Current Outpatient Medications   Medication Sig    sulfamethoxazole-trimethoprim (BACTRIM DS) 800-160 MG tablet Take 1 tablet by mouth 2 times daily    Calcium Citrate-Vitamin D3 315-6.25 MG-MCG TABS Take 2 tablets by mouth    clobetasol (TEMOVATE) 0.05 % external ointment Apply topically 2 times daily    entecavir (BARACLUDE) 0.5 MG tablet Take 1 tablet (0.5 mg) by mouth daily    fluticasone-vilanterol (BREO ELLIPTA) 100-25 MCG/ACT inhaler Inhale 1 puff into the lungs daily    ibuprofen (ADVIL/MOTRIN) 600 MG tablet Take 1 tablet by mouth 4 times daily as needed    mycophenolate (GENERIC EQUIVALENT) 500 MG tablet Take 3 tablets (1,500 mg) by mouth daily Labs every 8-12 weeks.    mycophenolate (GENERIC EQUIVALENT) 500 MG tablet Take 1,500 mg by mouth    Vitamin D3 (VITAMIN D, CHOLECALCIFEROL,) 25 mcg (1000 units) tablet Take by mouth daily     No current facility-administered medications for this encounter.         Tobacco History:  reports that she has never smoked. She has never used smokeless tobacco.       REVIEW OF SYMPTOMS:   The review of systems was negative except as noted in the HPI.           PHYSICAL EXAMINATION:     /71 (BP Location: Right arm, Patient Position: Sitting)   Pulse 75   Temp 96.8  F (36  C) (Temporal)   Resp 20            GENERAL: The patient overall appears well and is no acute distress.   HEAD: normocephalic   EYES: Sclera and conjunctiva clear   NECK: no obvious masses   LUNGS: breathing is unlabored.   EXTREMITIES: No clubbing, cyanosis or  edema   SKIN: No rashes or other abnormalities except as noted under the Wound section below.   NEUROLOGICAL: normal motor and sensory function       WOUND/ulcer: The wound appears healthy with no sign of infection.   Wound bed: granulation tissue  Periwound: healthy intact skin  There is just a pinpoint open area remaining at the right axilla.    The left axillary wound surface area is about the same however the undermining is more compared with her last clinic visit.      Also see below for wound details:     Circumferential volume measures:             No data to display                Ulceration(s)/Wound(s):   Please see the media tab under the chart review for pictures of the wounds.  Nursing staff removed dressings and cleansed wound.    Wound (used by OP [facility] only) 07/11/23 1423 Left axilla other (see comments) (Active)   Thickness/Stage full thickness 08/22/23 0925   Base red 08/22/23 0925   Periwound intact 08/22/23 0925   Periwound Temperature warm 08/22/23 0925   Periwound Skin Turgor soft 08/22/23 0925   Edges open 08/22/23 0925   Length (cm) 0.8 08/22/23 0925   Width (cm) 1.4 08/22/23 0925   Depth (cm) 0.6 08/22/23 0925   Wound (cm^2) 1.12 cm^2 08/22/23 0925   Wound Volume (cm^3) 0.67 cm^3 08/22/23 0925   Wound healing % 60 08/22/23 0925   Undermining [Depth (cm)/Location] 1.7cm @ 1-3 o'clock 08/22/23 0925   Drainage Characteristics/Odor serosanguineous 08/22/23 0925   Drainage Amount moderate 08/22/23 0925   Care, Wound non-select wound debridement performed 08/22/23 0925       Wound (used by OP [facility] only) 07/11/23 1425 Right axilla other (see comments) (Active)   Thickness/Stage full thickness 08/22/23 0925   Base pink 08/22/23 0925   Periwound intact 08/22/23 0925   Periwound Temperature warm 08/22/23 0925   Periwound Skin Turgor soft 08/22/23 0925   Edges open 08/22/23 0925   Length (cm) 0.2 08/22/23 0925   Width (cm) 0.2 08/22/23 0925   Depth (cm) 0.2 08/22/23 0925   Wound (cm^2) 0.04 cm^2 08/22/23  0925   Wound Volume (cm^3) 0.01 cm^3 08/22/23 0925   Wound healing % 91.67 08/22/23 0925   Drainage Characteristics/Odor serosanguineous 08/22/23 0925   Drainage Amount moderate 08/22/23 0925   Care, Wound non-select wound debridement performed 08/22/23 0925             Recent Labs   Lab Test 06/08/23  1216 02/13/23  1356   A1C 6.2* 5.9*          Recent Labs   Lab Test 06/08/23  1216 05/17/23  0738 04/12/23  0950   ALBUMIN 4.2 4.0 4.3              No debridement performed today.                  ASSESSMENT:   This is a 53 year old  female with bilateral axillary wounds.          PLAN:   We will bandage the areas with Hydrofera Blue and a Mepilex bandage changed every other day.  We will continue with the current bandage regiment until she is seen by the dermatologist.  I do think since there is more undermining on the left side that stronger consideration for the Humira treatment would be worthwhile.  If there is no other changes in treatment plan from the dermatologist we will try different bandage regiment at her next clinic visit with me.  I have previously explained to the patient that the treatment of hidradenitis is largely dependent on the dermatologist to treat the underlying disease process.  I have encouraged her to continue to work with her dermatologist.  I have encouraged the patient to continue on their high protein diet to aid in wound healing.   The patient will return to the wound clinic in 3-4 weeks to see me again.        30 minutes spent on the date of the encounter doing chart review, history and exam, documentation and further activities per the note      Luis Enrique Yarbrough MD  08/22/2023   9:50 AM   [facility]  [phone]    This note was electronically signed by Luis Enrique Yarbrough MD        Further instructions from your care team         Angelica Jett      1970    A DME order for supplies has been placed to [facility]. If there are any issues  "with your order including not receiving the order please call [facility] at [phone] option 1. They can also provide a tracking number for you if you had supplies shipped to you.    Dressing changes outside of clinic are being performed by Patient    Follow up with Dermatology. Call to schedule the appointment.      Wound Dressing Change: Left Axilla and Right Axilla  - Wash your hands with soap and water before you begin your dressing change and prepare a clean surface for dressings.  - Okay to go into the shower can have water run over the wounds. Wash with mild soap and water, then dry areas well  - Apply clobetasol to skin around wound as directed by dermatologist  - Pack into wound 1/8 of 4x5\" Hydrofera Blue Ready-Transfer to Left Axilla only  - Cover with 1 4x4\" Cutimed Sorbian Border for each wound  Change daily      A diet high in protein is important for wound healing, we recommend getting 90 grams of protein per day. Taking protein shakes or bars are a good way to get extra protein in your diet.     Good sources of protein:  Pork 26g per 3 oz  Whey protein powder - 24g per scoop (on average)  Greek yogurt - 23g per 8oz   Chicken or Turkey - 23g per 3oz  Fish - 20-25g per 3oz  Beef - 18-23g per 3oz  Navy beans - 20g per cup  Cottage cheese - 14g per 1/2 cup   Lentils - 13g per 1/4 cup  Beef jerky 13g per 1oz  2% milk - 8g per cup  Peanut butter - 8g per 2 tablespoons  Eggs - 6g per egg  Mixed nuts - 6g per 2oz         LuisE nrique Yarbrough M.D. August 22, 2023    Call us at [phone] if you have any questions about your wounds, have redness or swelling around your wound, have a fever of 101 degrees Fahrenheit or greater or if you have any other problems or concerns. We answer the phone Monday through Friday 8 am to 4 pm, please leave a message as we check the voicemail frequently throughout the day.     If you had a positive experience please indicate that on your patient satisfaction survey " form that [facility] will be sending you.    It was a pleasure meeting with you today.  Thank you for allowing me and my team the privilege of caring for you today.  YOU are the reason we are here, and I truly hope we provided you with the excellent service you deserve.  Please let us know if there is anything else we can do for you so that we can be sure you are leaving completely satisfied with your care experience.      If you have any billing related questions please call the [facility] Business office at [phone]. The clinic staff does not handle billing related matters.    If you are scheduled to have a follow up appointment, you will receive a reminder call the day before your visit. On the appointment day please arrive 15 minutes prior to your appointment time. If you are unable to keep that appointment, please call the clinic to cancel or reschedule. If you are more than 10 minutes late or greater for your scheduled appointment time, the clinic policy is that you may be asked to reschedule.         ,

## 2023-08-22 NOTE — DISCHARGE INSTRUCTIONS
"Angelica Jett      1970    A DME order for supplies has been placed to [facility]. If there are any issues with your order including not receiving the order please call [facility] at [phone] option 1. They can also provide a tracking number for you if you had supplies shipped to you.    Dressing changes outside of clinic are being performed by Patient    Follow up with Dermatology. Call to schedule the appointment.      Wound Dressing Change: Left Axilla and Right Axilla  - Wash your hands with soap and water before you begin your dressing change and prepare a clean surface for dressings.  - Okay to go into the shower can have water run over the wounds. Wash with mild soap and water, then dry areas well  - Apply clobetasol to skin around wound as directed by dermatologist  - Pack into wound 1/8 of 4x5\" Hydrofera Blue Ready-Transfer to Left Axilla only  - Cover with 1 4x4\" Cutimed Sorbian Border for each wound  Change daily      A diet high in protein is important for wound healing, we recommend getting 90 grams of protein per day. Taking protein shakes or bars are a good way to get extra protein in your diet.     Good sources of protein:  Pork 26g per 3 oz  Whey protein powder - 24g per scoop (on average)  Greek yogurt - 23g per 8oz   Chicken or Turkey - 23g per 3oz  Fish - 20-25g per 3oz  Beef - 18-23g per 3oz  Navy beans - 20g per cup  Cottage cheese - 14g per 1/2 cup   Lentils - 13g per 1/4 cup  Beef jerky 13g per 1oz  2% milk - 8g per cup  Peanut butter - 8g per 2 tablespoons  Eggs - 6g per egg  Mixed nuts - 6g per 2oz         Luis Enrique Yarbrough M.D. August 22, 2023    Call us at [phone] if you have any questions about your wounds, have redness or swelling around your wound, have a fever of 101 degrees Fahrenheit or greater or if you have any other problems or concerns. We answer the phone Monday through Friday 8 am to 4 pm, please leave a message as we check the " voicemail frequently throughout the day.     If you had a positive experience please indicate that on your patient satisfaction survey form that [facility] will be sending you.    It was a pleasure meeting with you today.  Thank you for allowing me and my team the privilege of caring for you today.  YOU are the reason we are here, and I truly hope we provided you with the excellent service you deserve.  Please let us know if there is anything else we can do for you so that we can be sure you are leaving completely satisfied with your care experience.      If you have any billing related questions please call the [facility] Business office at [phone]. The clinic staff does not handle billing related matters.    If you are scheduled to have a follow up appointment, you will receive a reminder call the day before your visit. On the appointment day please arrive 15 minutes prior to your appointment time. If you are unable to keep that appointment, please call the clinic to cancel or reschedule. If you are more than 10 minutes late or greater for your scheduled appointment time, the clinic policy is that you may be asked to reschedule.

## 2023-08-22 NOTE — TELEPHONE ENCOUNTER
----- Message from Garrison Bustillo MD sent at 8/22/2023  3:46 PM CDT -----  Regarding: RE: refill ok?  Can you ask her if it helps her if it helps her, if it does, she can continue. If she does not find a difference when she misses a dose, she may discontinue. I do not see an objective reason for her to have it based on PFTs but some patients feel better with inhalers.     AK  ----- Message -----  From: Marielos Arnold RN  Sent: 8/22/2023   3:33 PM CDT  To: Garrison Bustillo MD  Subject: refill ok?                                       Wanted to make sure you were ok with pt continuing this medication. She has been on this for several years (started in [place]). It was not mentioned in your last visit note.     Breo 100-25 1 puff daily.     Thanks,     Karlie

## 2023-08-28 PROBLEM — L98.492: Status: ACTIVE | Noted: 2023-08-28

## 2023-08-28 NOTE — ADDENDUM NOTE
Encounter addended by: Wilda Jenkins RN on: 8/28/2023 1:27 PM   Actions taken: Visit diagnoses modified, Problem List modified, Order list changed, Diagnosis association updated

## 2023-09-07 ENCOUNTER — OFFICE VISIT (OUTPATIENT)
Dept: DERMATOLOGY | Facility: CLINIC | Age: 53
End: 2023-09-07
Payer: COMMERCIAL

## 2023-09-07 VITALS — BODY MASS INDEX: 25.74 KG/M2 | WEIGHT: 159.5 LBS

## 2023-09-07 DIAGNOSIS — L73.2 HIDRADENITIS SUPPURATIVA: Primary | ICD-10-CM

## 2023-09-07 PROCEDURE — 99214 OFFICE O/P EST MOD 30 MIN: CPT | Mod: 25 | Performed by: DERMATOLOGY

## 2023-09-07 PROCEDURE — 90471 IMMUNIZATION ADMIN: CPT | Performed by: DERMATOLOGY

## 2023-09-07 PROCEDURE — 11900 INJECT SKIN LESIONS </W 7: CPT | Mod: GC | Performed by: DERMATOLOGY

## 2023-09-07 PROCEDURE — 90750 HZV VACC RECOMBINANT IM: CPT | Performed by: DERMATOLOGY

## 2023-09-07 RX ORDER — TRIAMCINOLONE ACETONIDE 40 MG/ML
40 INJECTION, SUSPENSION INTRA-ARTICULAR; INTRAMUSCULAR ONCE
Status: COMPLETED | OUTPATIENT
Start: 2023-09-07 | End: 2023-09-07

## 2023-09-07 RX ORDER — SULFAMETHOXAZOLE/TRIMETHOPRIM 800-160 MG
1 TABLET ORAL
Qty: 90 TABLET | Refills: 3 | Status: SHIPPED | OUTPATIENT
Start: 2023-09-07 | End: 2024-04-04

## 2023-09-07 RX ADMIN — TRIAMCINOLONE ACETONIDE 40 MG: 40 INJECTION, SUSPENSION INTRA-ARTICULAR; INTRAMUSCULAR at 15:01

## 2023-09-07 NOTE — PROGRESS NOTES
[facility] Dermatology Note  Encounter Date: Sep 7, 2023  Office Visit     Dermatology Problem List:  1. Hidradenitis suppurativa with ulcerating/PG-like phenotype in left axilla  2. Chronic hepatitis B (taking Entrcavir)  3. Polymyositis 2005 on mycophenolate  4. Interstitial lung disease    ____________________________________________    Assessment & Plan:   #  Hidradenitis suppurativa +/- PG vs HS with PG-phenotype  Overall improving on current regimen with the L axilla wound closing up and healing well. Previously discussed potentially starting Humira with rheumatology and pulmonology who were supportive of the idea, although patient is concerned about side effects and immunosuppression. She would prefer to continue Bactrim at this time. Other options include ERIK inhibition as well, but would not do cellcept and ERIK inhibition together. There is limited data on combining adalimumab with cellcept but she is on a lower dose of mycophenolate.  - Discussed treatment options today; will proceed with trial of ILK 40 to the R axilla nodule  - Increase use of topical clobetasol to daily for the L axilla  - Continue Bactrim BID  - Continue mycophenolate 1500 mg daily  - 2nd dose of shingles vaccine today    Follow-up: 3 month(s) in-person, or earlier for new or changing lesions    Staff and Resident:     Attending: Dr. Sotomayor staffed the patient.    Resident: Greg Hopper MD (PGY-4)    Staff Physician Comments:   I saw and evaluated the patient with the resident and I agree with the assessment and plan.  I was present for the entire minor procedure and examination.    Sanjiv Sotomayor MD, FAAD    Departments of Internal Medicine and Dermatology  [facility]  [phone]      ____________________________________________    CC: Derm Problem (Angelica is following up for HS. States things are much better since last visit.)    HPI:  Ms. Angelica Jett is a(n) 53 year old  female who presents today as a return patient for HS. Last seen virtually 6/22/23. Today patient states she is doing better. The wounds under the arms are closing up and getting smaller. Still does note some drainage but not too bad. No pain today. Remains on Bactrim and mycophenolate 1500 mg daily. Also using topical clobetasol oint every other day. No other areas of concern.    HS Nurse Assessment        6/8/2023     9:50 AM 6/22/2023     3:20 PM 9/7/2023     2:14 PM   Nurse Assessment Data   Over the past 30 days how many old lesions flared back up?  2 1   Over the past 30 days how many new lesions did you get?  0 0   Over the past week, how many dressing changes do you do each day?  2 1   Over the past week, has your wound drainage been:  Moderate Mild   Rate your HS overall from 0 - 10 (0 = no disease, 10 = worst) over the past week:   5 0   Rate your pain score from 0 - 10 (0 = no disease, 10 = worst) for the most painful/symptomatic lesion in the past week:   5 - Moderate Pain 0 - No Pain   Over the past week, how much has HS influenced your quality of life?  moderately not at all   Total DLQI Score 1 (no effect at all on patient's life)       Physical Exam:  Vitals: Wt 72.3 kg (159 lb 8 oz)   BMI 25.74 kg/m    SKIN: Focused examination of bilateral axilla was performed.  - L axilla with 1.4 cm x 0.5 cm ulcerated open wound (much improved)  - R axilla with 1 small draining tunnel   - No other lesions of concern on areas examined.     HS Data      6/8/2023    11:00 AM   HS Exam Data   LC Type LC1   Clinical Subtypes Regular type   Acne? No   Dissecting Cellulitis? No   Visual analogue score (0-100) 40   Total Lindsay Stage I   Total Inflammatory Nodules 1   Total Abcesses 0   Total Draining Tunnels 1   Total Abscess and Nodule Count 1   IHS4 Score  5   Total  HASI Score 27   HS-PGA 1         Medications:  Current Outpatient Medications   Medication    Calcium Citrate-Vitamin D3 315-6.25 MG-MCG TABS     clobetasol (TEMOVATE) 0.05 % external ointment    entecavir (BARACLUDE) 0.5 MG tablet    ibuprofen (ADVIL/MOTRIN) 600 MG tablet    mycophenolate (GENERIC EQUIVALENT) 500 MG tablet    mycophenolate (GENERIC EQUIVALENT) 500 MG tablet    sulfamethoxazole-trimethoprim (BACTRIM DS) 800-160 MG tablet    Vitamin D3 (VITAMIN D, CHOLECALCIFEROL,) 25 mcg (1000 units) tablet    fluticasone-vilanterol (BREO ELLIPTA) 100-25 MCG/ACT inhaler     Current Facility-Administered Medications   Medication    triamcinolone (KENALOG-40) injection 40 mg    zoster vaccine recombinant adjuvanted (SHINGRIX) injection 0.5 mL      Past Medical History:   Patient Active Problem List   Diagnosis    Pulmonary hypertension (H)    Hepatitis B virus infection    Interstitial lung disease (H)    Abscess    Hidradenitis axillaris    Benign tubular adenoma of large intestine    Diastolic dysfunction    GERD (gastroesophageal reflux disease)    OP (osteoporosis)    Other irritable bowel syndrome    Polymyositis (H)    Wound, open axilla, right, subsequent encounter    Wound, open axilla, left, subsequent encounter    Skin ulcer of axilla with fat layer exposed (H)     Past Medical History:   Diagnosis Date    Diastolic dysfunction 2/24/2014

## 2023-09-07 NOTE — NURSING NOTE
Dermatology Rooming Note    Angelica Jett's goals for this visit include:   Chief Complaint   Patient presents with    Derm Problem     Angelica is following up for HS. States things are much better since last visit.

## 2023-09-07 NOTE — NURSING NOTE
Drug Administration Record    Prior to injection, verified patient identity using patient's name and date of birth.  Due to injection administration, patient instructed to remain in clinic for 15 minutes  afterwards, and to report any adverse reaction to me immediately.    Drug Name: triamcinolone acetonide(kenalog)  Dose: 1mL of triamcinolone 40mg/mL, 40mg dose  Route administered: ID  NDC #: Kenalog-40 ([address]-4)  Amount of waste(mL):0  Reason for waste: Single use vial    LOT #: [id number]  : amneal  EXPIRATION DATE: 03/2025

## 2023-09-07 NOTE — LETTER
9/7/2023       RE: Angelica Jett  [address] [place] [address]     Dear Colleague,    Thank you for referring your patient, Angelica Jett, to the [facility] [place] at [facility]. Please see a copy of my visit note below.    [facility] Dermatology Note  Encounter Date: Sep 7, 2023  Office Visit     Dermatology Problem List:  1. Hidradenitis suppurativa with ulcerating/PG-like phenotype in left axilla  2. Chronic hepatitis B (taking Entrcavir)  3. Polymyositis 2005 on mycophenolate  4. Interstitial lung disease    ____________________________________________    Assessment & Plan:   #  Hidradenitis suppurativa +/- PG vs HS with PG-phenotype  Overall improving on current regimen with the L axilla wound closing up and healing well. Previously discussed potentially starting Humira with rheumatology and pulmonology who were supportive of the idea, although patient is concerned about side effects and immunosuppression. She would prefer to continue Bactrim at this time. Other options include ERIK inhibition as well, but would not do cellcept and ERIK inhibition together. There is limited data on combining adalimumab with cellcept but she is on a lower dose of mycophenolate.  - Discussed treatment options today; will proceed with trial of ILK 40 to the R axilla nodule  - Increase use of topical clobetasol to daily for the L axilla  - Continue Bactrim BID  - Continue mycophenolate 1500 mg daily  - 2nd dose of shingles vaccine today    Follow-up: 3 month(s) in-person, or earlier for new or changing lesions    Staff and Resident:     Attending: Dr. Sotomayor staffed the patient.    Resident: Greg Hopper MD (PGY-4)    Staff Physician Comments:   I saw and evaluated the patient with the resident and I agree with the assessment and plan.  I was present for the entire minor procedure and examination.    Sanjiv Sotomayor MD,  TIKI    Departments of Internal Medicine and Dermatology  [facility]  [phone]      ____________________________________________    CC: Derm Problem (Angelica is following up for HS. States things are much better since last visit.)    HPI:  Ms. Angelica Jett is a(n) 53 year old female who presents today as a return patient for HS. Last seen virtually 6/22/23. Today patient states she is doing better. The wounds under the arms are closing up and getting smaller. Still does note some drainage but not too bad. No pain today. Remains on Bactrim and mycophenolate 1500 mg daily. Also using topical clobetasol oint every other day. No other areas of concern.    HS Nurse Assessment        6/8/2023     9:50 AM 6/22/2023     3:20 PM 9/7/2023     2:14 PM   Nurse Assessment Data   Over the past 30 days how many old lesions flared back up?  2 1   Over the past 30 days how many new lesions did you get?  0 0   Over the past week, how many dressing changes do you do each day?  2 1   Over the past week, has your wound drainage been:  Moderate Mild   Rate your HS overall from 0 - 10 (0 = no disease, 10 = worst) over the past week:   5 0   Rate your pain score from 0 - 10 (0 = no disease, 10 = worst) for the most painful/symptomatic lesion in the past week:   5 - Moderate Pain 0 - No Pain   Over the past week, how much has HS influenced your quality of life?  moderately not at all   Total DLQI Score 1 (no effect at all on patient's life)       Physical Exam:  Vitals: Wt 72.3 kg (159 lb 8 oz)   BMI 25.74 kg/m    SKIN: Focused examination of bilateral axilla was performed.  - L axilla with 1.4 cm x 0.5 cm ulcerated open wound (much improved)  - R axilla with 1 small draining tunnel   - No other lesions of concern on areas examined.     HS Data      6/8/2023    11:00 AM   HS Exam Data   LC Type LC1   Clinical Subtypes Regular type   Acne? No   Dissecting Cellulitis? No   Visual analogue score  (0-100) 40   Total Lindsay Stage I   Total Inflammatory Nodules 1   Total Abcesses 0   Total Draining Tunnels 1   Total Abscess and Nodule Count 1   IHS4 Score  5   Total  HASI Score 27   HS-PGA 1         Medications:  Current Outpatient Medications   Medication    Calcium Citrate-Vitamin D3 315-6.25 MG-MCG TABS    clobetasol (TEMOVATE) 0.05 % external ointment    entecavir (BARACLUDE) 0.5 MG tablet    ibuprofen (ADVIL/MOTRIN) 600 MG tablet    mycophenolate (GENERIC EQUIVALENT) 500 MG tablet    mycophenolate (GENERIC EQUIVALENT) 500 MG tablet    sulfamethoxazole-trimethoprim (BACTRIM DS) 800-160 MG tablet    Vitamin D3 (VITAMIN D, CHOLECALCIFEROL,) 25 mcg (1000 units) tablet    fluticasone-vilanterol (BREO ELLIPTA) 100-25 MCG/ACT inhaler     Current Facility-Administered Medications   Medication    triamcinolone (KENALOG-40) injection 40 mg    zoster vaccine recombinant adjuvanted (SHINGRIX) injection 0.5 mL      Past Medical History:   Patient Active Problem List   Diagnosis    Pulmonary hypertension (H)    Hepatitis B virus infection    Interstitial lung disease (H)    Abscess    Hidradenitis axillaris    Benign tubular adenoma of large intestine    Diastolic dysfunction    GERD (gastroesophageal reflux disease)    OP (osteoporosis)    Other irritable bowel syndrome    Polymyositis (H)    Wound, open axilla, right, subsequent encounter    Wound, open axilla, left, subsequent encounter    Skin ulcer of axilla with fat layer exposed (H)     Past Medical History:   Diagnosis Date    Diastolic dysfunction 2/24/2014

## 2023-09-19 ENCOUNTER — HOSPITAL ENCOUNTER (OUTPATIENT)
Dept: WOUND CARE | Facility: CLINIC | Age: 53
Discharge: HOME | End: 2023-09-19
Attending: FAMILY MEDICINE | Admitting: FAMILY MEDICINE
Payer: COMMERCIAL

## 2023-09-19 VITALS — TEMPERATURE: 96.9 F | DIASTOLIC BLOOD PRESSURE: 59 MMHG | SYSTOLIC BLOOD PRESSURE: 116 MMHG | HEART RATE: 79 BPM

## 2023-09-19 DIAGNOSIS — S41.101D: Primary | ICD-10-CM

## 2023-09-19 DIAGNOSIS — L98.492 SKIN ULCER OF AXILLA WITH FAT LAYER EXPOSED (H): ICD-10-CM

## 2023-09-19 DIAGNOSIS — S41.102D WOUND, OPEN AXILLA, LEFT, SUBSEQUENT ENCOUNTER: ICD-10-CM

## 2023-09-19 PROCEDURE — 99213 OFFICE O/P EST LOW 20 MIN: CPT | Performed by: FAMILY MEDICINE

## 2023-09-19 PROCEDURE — 97602 WOUND(S) CARE NON-SELECTIVE: CPT

## 2023-09-19 NOTE — DISCHARGE INSTRUCTIONS
"Angelica Jett      1970    For NO STING barrier film only;  A DME order for supplies has been placed to [facility]. If there are any issues with your order including not receiving the order please call [facility] at [phone] option 1. They can also provide a tracking number for you if you had supplies shipped to you.    Dressing changes outside of clinic are being performed by Patient    Follow up with Dermatology. Call to schedule the appointment.-DONE    Wound Dressing Change: Left Axilla and Right Axilla  - Wash your hands with soap and water before you begin your dressing change and prepare a clean surface for dressings.  - Okay to go into the shower can have water run over the wounds. Wash with mild soap and water, then dry areas well  - Apply clobetasol to skin around wound as directed by dermatologist  - apply no sting barrier film surrounding wound where adhesive will adhere  - Pack into wound 1/8 of 4x5\" Hydrofera Blue Ready-Transfer to Left Axilla only  - Cover with one 4x4\" Cutimed Sorbian Border for each wound  Change every other day    A diet high in protein is important for wound healing, we recommend getting 90 grams of protein per day. Taking protein shakes or bars are a good way to get extra protein in your diet.  Good sources of protein:  Pork 26g per 3 oz  Whey protein powder - 24g per scoop (on average)  Greek yogurt - 23g per 8oz  Chicken or Turkey - 23g per 3oz  Fish - 20-25g per 3oz  Beef - 18-23g per 3oz  Navy beans - 20g per cup  Cottage cheese - 14g per 1/2 cup  Lentils - 13g per 1/4 cup  Beef jerky 13g per 1oz  2% milk - 8g per cup  Peanut butter - 8g per 2 tablespoons  Eggs - 6g per egg  Mixed nuts - 6g per 2oz       Luis Enrique Yarbrough M.D. September 19, 2023    Call us at [phone] if you have any questions about your wounds, have redness or swelling around your wound, have a fever of 101 degrees Fahrenheit or greater or if you have any other " problems or concerns. We answer the phone Monday through Friday 8 am to 4 pm, please leave a message as we check the voicemail frequently throughout the day.     If you had a positive experience please indicate that on your patient satisfaction survey form that [facility] will be sending you.    It was a pleasure meeting with you today.  Thank you for allowing me and my team the privilege of caring for you today.  YOU are the reason we are here, and I truly hope we provided you with the excellent service you deserve.  Please let us know if there is anything else we can do for you so that we can be sure you are leaving completely satisfied with your care experience.      If you have any billing related questions please call the [facility] Business office at [phone]. The clinic staff does not handle billing related matters.    If you are scheduled to have a follow up appointment, you will receive a reminder call the day before your visit. On the appointment day please arrive 15 minutes prior to your appointment time. If you are unable to keep that appointment, please call the clinic to cancel or reschedule. If you are more than 10 minutes late or greater for your scheduled appointment time, the clinic policy is that you may be asked to reschedule.

## 2023-09-19 NOTE — PROGRESS NOTES
Wound Clinic Note          Visit date: 09/19/2023       Cheif Complaint:     Angelica Jett is a 53 year old  female had concerns including WOUND CARE.  She has bilateral axillary wounds due to hidradenitis suppurativa.      HISTORY OF PRESENT ILLNESS:    Angelica Jett reports the ulcer has been present since March 2023.  The wound began without a clear cause.   Prior to March 2023 she had not had any other difficult to heal wounds in the axilla, groins or buttock area.  She has been referred to a dermatologist, Dr. Sotomayor, who she had a virtual visit with on June 22, 2023.  He had recommended she continue on antibiotics which she continues on now with no symptoms of an adverse reaction.  She is also been applying a clobetasol cream to the periwound areas.  He had also recommended that he apply to the insurance company for approval to use Humira.  However the patient chose to hold off on pursuing that option for now.      Since her last clinic visit with me she saw the dermatologist again and they recommended continuing on the current course of treatment.    Since her last clinic visit with me she has been bandaging the wounds with Hydrofera Blue and a Mepilex bandage changed every other day.  She reports has been little to no drainage from the right axillary wound recently and the wound is nearly healed.          The pateint denies fevers or chills.  They report the pain from the wound has been 0/10 and has remained about the same recently.      Today the patient reports maintaining a high protein diet and taking protein supplements regularly.        She does not have diabetes and does not smoke cigarettes.  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        The patient has recently had some symptoms of wound infection and is currently taking antibiotics which they have been tolerating well with no symptoms of an adverse reaction.       Problem List:   Past Medical History:   Diagnosis Date     Diastolic dysfunction 2/24/2014              Family Hx: family history is not on file.       Surgical Hx:   Past Surgical History:   Procedure Laterality Date    BIOPSY  2005    COLONOSCOPY  05/28/2021          Allergies:    Allergies   Allergen Reactions    Iodine Hives, Rash and Other (See Comments)     Patient reports this allergy is because she is allergic to shrimp      Shellfish-Derived Products Difficulty breathing and Hives     Shrimp      Tetanus Toxoids               Medication History:    Current Outpatient Medications   Medication Sig    Calcium Citrate-Vitamin D3 315-6.25 MG-MCG TABS Take 2 tablets by mouth    clobetasol (TEMOVATE) 0.05 % external ointment Apply topically 2 times daily    entecavir (BARACLUDE) 0.5 MG tablet Take 1 tablet (0.5 mg) by mouth daily    fluticasone-vilanterol (BREO ELLIPTA) 100-25 MCG/ACT inhaler TAKE 1 PUFF BY MOUTH EVERY DAY (Patient not taking: Reported on 9/7/2023)    ibuprofen (ADVIL/MOTRIN) 600 MG tablet Take 1 tablet by mouth 4 times daily as needed    mycophenolate (GENERIC EQUIVALENT) 500 MG tablet Take 3 tablets (1,500 mg) by mouth daily Labs every 8-12 weeks.    mycophenolate (GENERIC EQUIVALENT) 500 MG tablet Take 1,500 mg by mouth    sulfamethoxazole-trimethoprim (BACTRIM DS) 800-160 MG tablet Take 1 tablet by mouth 2 times daily    Vitamin D3 (VITAMIN D, CHOLECALCIFEROL,) 25 mcg (1000 units) tablet Take by mouth daily     No current facility-administered medications for this encounter.         Tobacco History:  reports that she has never smoked. She has never used smokeless tobacco.       REVIEW OF SYMPTOMS:   The review of systems was negative except as noted in the HPI.           PHYSICAL EXAMINATION:     /59 (BP Location: Left arm, Patient Position: Chair, Cuff Size: Adult Regular)   Pulse 79   Temp 96.9  F (36.1  C) (Temporal)            GENERAL: The patient overall appears well and is no acute distress.   HEAD: normocephalic   EYES: Sclera and  conjunctiva clear   NECK: no obvious masses   LUNGS: breathing is unlabored.   EXTREMITIES: No clubbing, cyanosis or edema   SKIN: No rashes or other abnormalities except as noted under the Wound section below.   NEUROLOGICAL: normal motor and sensory function       WOUND/ulcer: The wound appears healthy with no sign of infection.   Wound bed: granulation tissue  Periwound: healthy intact skin  There is just a pinpoint open area remaining at the right axilla.    Today the left axillary wound surface area is quite a bit less and the undermining is less compared with her last clinic visit.      Also see below for wound details:     Circumferential volume measures:             No data to display                Ulceration(s)/Wound(s):   Please see the media tab under the chart review for pictures of the wounds.  Nursing staff removed dressings and cleansed wound.    Wound (used by OP I only) 07/11/23 1423 Left axilla other (see comments) (Active)   Thickness/Stage full thickness 09/19/23 1042   Base red 09/19/23 1042   Periwound intact 09/19/23 1042   Periwound Temperature warm 09/19/23 1042   Periwound Skin Turgor soft 09/19/23 1042   Edges open 09/19/23 1042   Length (cm) 0.8 09/19/23 1042   Width (cm) 0.4 09/19/23 1042   Depth (cm) 0.5 09/19/23 1042   Wound (cm^2) 0.32 cm^2 09/19/23 1042   Wound Volume (cm^3) 0.16 cm^3 09/19/23 1042   Wound healing % 88.57 09/19/23 1042   Undermining [Depth (cm)/Location] 1-6 oclock/ 1.1 cm 09/19/23 1042   Drainage Characteristics/Odor serosanguineous 09/19/23 1042   Drainage Amount moderate 09/19/23 1042   Care, Wound non-select wound debridement performed 09/19/23 1042       Wound (used by OP I only) 07/11/23 1425 Right axilla other (see comments) (Active)   Thickness/Stage full thickness 09/19/23 1042   Base epithelialization;pink 09/19/23 1042   Periwound intact 09/19/23 1042   Periwound Temperature warm 09/19/23 1042   Periwound Skin Turgor soft 09/19/23 1042   Edges open  09/19/23 1042   Length (cm) 0.1 09/19/23 1042   Width (cm) 0.1 09/19/23 1042   Depth (cm) 0.2 09/19/23 1042   Wound (cm^2) 0.01 cm^2 09/19/23 1042   Wound Volume (cm^3) 0 cm^3 09/19/23 1042   Wound healing % 97.92 09/19/23 1042   Drainage Characteristics/Odor serosanguineous 09/19/23 1042   Drainage Amount moderate 09/19/23 1042   Care, Wound non-select wound debridement performed 09/19/23 1042             Recent Labs   Lab Test 06/08/23  1216 02/13/23  1356   A1C 6.2* 5.9*          Recent Labs   Lab Test 06/08/23  1216 05/17/23  0738 04/12/23  0950   ALBUMIN 4.2 4.0 4.3              No debridement performed today.                  ASSESSMENT:   This is a 53 year old  female with bilateral axillary wounds.          PLAN:   We will bandage the areas with Hydrofera Blue and a Mepilex bandage changed every other day.  Strongly encouraged her to continue to work with the dermatologist to treat the underlying skin condition.  I have encouraged the patient to continue on their high protein diet to aid in wound healing.   The patient will return to the wound clinic in 3-4 weeks to see me again.        20 minutes spent on the date of the encounter doing chart review, history and exam, documentation and further activities per the note, this time excludes any procedure time      Luis Enrique Yarbrough MD  09/19/2023   11:13 AM   [facility] Vascular/Wound  [phone]    This note was electronically signed by Luis Enrique Yarbrough MD        Further instructions from your care team         Angelica Jett      1970    For NO STING barrier film only;  A DME order for supplies has been placed to [employer]. If there are any issues with your order including not receiving the order please call [employer] at [phone] option 1. They can also provide a tracking number for you if you had supplies shipped to you.    Dressing changes outside of clinic are being performed by Patient    Follow up  "with Dermatology. Call to schedule the appointment.-DONE    Wound Dressing Change: Left Axilla and Right Axilla  - Wash your hands with soap and water before you begin your dressing change and prepare a clean surface for dressings.  - Okay to go into the shower can have water run over the wounds. Wash with mild soap and water, then dry areas well  - Apply clobetasol to skin around wound as directed by dermatologist  - apply no sting barrier film surrounding wound where adhesive will adhere  - Pack into wound 1/8 of 4x5\" Hydrofera Blue Ready-Transfer to Left Axilla only  - Cover with one 4x4\" Cutimed Sorbian Border for each wound  Change every other day    A diet high in protein is important for wound healing, we recommend getting 90 grams of protein per day. Taking protein shakes or bars are a good way to get extra protein in your diet.  Good sources of protein:  Pork 26g per 3 oz  Whey protein powder - 24g per scoop (on average)  Greek yogurt - 23g per 8oz  Chicken or Turkey - 23g per 3oz  Fish - 20-25g per 3oz  Beef - 18-23g per 3oz  Navy beans - 20g per cup  Cottage cheese - 14g per 1/2 cup  Lentils - 13g per 1/4 cup  Beef jerky 13g per 1oz  2% milk - 8g per cup  Peanut butter - 8g per 2 tablespoons  Eggs - 6g per egg  Mixed nuts - 6g per 2oz       Luis Enrique Yarbrough M.D. September 19, 2023    Call us at [phone] if you have any questions about your wounds, have redness or swelling around your wound, have a fever of 101 degrees Fahrenheit or greater or if you have any other problems or concerns. We answer the phone Monday through Friday 8 am to 4 pm, please leave a message as we check the voicemail frequently throughout the day.     If you had a positive experience please indicate that on your patient satisfaction survey form that [facility] will be sending you.    It was a pleasure meeting with you today.  Thank you for allowing me and my team the privilege of caring for you today.  YOU are the " reason we are here, and I truly hope we provided you with the excellent service you deserve.  Please let us know if there is anything else we can do for you so that we can be sure you are leaving completely satisfied with your care experience.      If you have any billing related questions please call the [facility] Business office at [phone]. The clinic staff does not handle billing related matters.    If you are scheduled to have a follow up appointment, you will receive a reminder call the day before your visit. On the appointment day please arrive 15 minutes prior to your appointment time. If you are unable to keep that appointment, please call the clinic to cancel or reschedule. If you are more than 10 minutes late or greater for your scheduled appointment time, the clinic policy is that you may be asked to reschedule.        ,

## 2023-10-10 ENCOUNTER — HOSPITAL ENCOUNTER (OUTPATIENT)
Dept: WOUND CARE | Facility: CLINIC | Age: 53
Discharge: HOME | End: 2023-10-10
Attending: FAMILY MEDICINE | Admitting: FAMILY MEDICINE
Payer: COMMERCIAL

## 2023-10-10 VITALS — TEMPERATURE: 96.8 F | DIASTOLIC BLOOD PRESSURE: 65 MMHG | HEART RATE: 76 BPM | SYSTOLIC BLOOD PRESSURE: 115 MMHG

## 2023-10-10 DIAGNOSIS — L98.492 SKIN ULCER OF AXILLA WITH FAT LAYER EXPOSED (H): Primary | ICD-10-CM

## 2023-10-10 DIAGNOSIS — S41.102D WOUND, OPEN AXILLA, LEFT, SUBSEQUENT ENCOUNTER: ICD-10-CM

## 2023-10-10 DIAGNOSIS — S41.101D: ICD-10-CM

## 2023-10-10 PROCEDURE — 97602 WOUND(S) CARE NON-SELECTIVE: CPT

## 2023-10-10 PROCEDURE — 99213 OFFICE O/P EST LOW 20 MIN: CPT | Performed by: FAMILY MEDICINE

## 2023-10-10 NOTE — PROGRESS NOTES
Patient arrived for wound care visit. Wound Care Nurse time spent evaluating patient record, and completed a full evaluation; provided recommendation based on treatment plan. Reviewed discharge instructions, patient education, and discussed plan of care with appropriate medical team staff members and patient and/or family members.    Klarissa Cooney RN

## 2023-10-10 NOTE — DISCHARGE INSTRUCTIONS
Angelica Damonko      1970    A DME order was not completed because supplies were not needed  Dressing changes outside of clinic are being performed by Patient     Plan:  Continue to follow with Dermatology. Next follow up with them in a few months.     Left Axilla and Right Axilla Cares  Monitor sites daily and okay to keep open to air  Continue your same Clobetasol as prescribed by your Dermatologist  Okay to place gauze to sites if drainage noted  If wounds start to reopen or you have other questions or concerns, please call the [facility] at [phone]     Protein:  A diet high in protein is important for wound healing, we recommend getting 90 grams of protein per day. Taking protein shakes or bars are a good way to get extra protein in your diet.  Good sources of protein:  Pork 26g per 3 oz  Whey protein powder - 24g per scoop (on average)  Greek yogurt - 23g per 8oz  Chicken or Turkey - 23g per 3oz  Fish - 20-25g per 3oz  Beef - 18-23g per 3oz  Navy beans - 20g per cup  Cottage cheese - 14g per 1/2 cup  Lentils - 13g per 1/4 cup  Beef jerky 13g per 1oz  2% milk - 8g per cup  Peanut butter - 8g per 2 tablespoons  Eggs - 6g per egg  Mixed nuts - 6g per 2oz      Luis Enrique Yarbrough M.D. October 10, 2023  Call us at [phone] if you have any questions about your wounds, have redness or swelling around your wound, have a fever of 101 degrees Fahrenheit or greater or if you have any other problems or concerns. We answer the phone Monday through Friday 8 am to 4 pm, please leave a message as we check the voicemail frequently throughout the day.     If you had a positive experience please indicate that on your patient satisfaction survey form that [facility] will be sending you.    It was a pleasure meeting with you today.  Thank you for allowing me and my team the privilege of caring for you today.  YOU are the reason we are here, and I truly hope we provided you with the excellent  service you deserve.  Please let us know if there is anything else we can do for you so that we can be sure you are leaving completely satisfied with your care experience.      If you have any billing related questions please call the [facility] Business office at [phone]. The clinic staff does not handle billing related matters.    If you are scheduled to have a follow up appointment, you will receive a reminder call the day before your visit. On the appointment day please arrive 15 minutes prior to your appointment time. If you are unable to keep that appointment, please call the clinic to cancel or reschedule. If you are more than 10 minutes late or greater for your scheduled appointment time, the clinic policy is that you may be asked to reschedule.

## 2023-10-10 NOTE — PROGRESS NOTES
Wound Clinic Note          Visit date: 10/10/2023       Cheif Complaint:     Angelica Jett is a 53 year old  female had concerns including WOUND CARE.  She has bilateral axillary wounds due to hidradenitis suppurativa.      HISTORY OF PRESENT ILLNESS:    Angelica Jett reports the ulcer has been present since March 2023.  The wound began without a clear cause.   Prior to March 2023 she had not had any other difficult to heal wounds in the axilla, groins or buttock area.  She has been referred to a dermatologist, Dr. Sotomayor, who she had a virtual visit with on June 22, 2023.  He had recommended she continue on antibiotics which she continues on now with no symptoms of an adverse reaction.  She is also been applying a clobetasol cream to the periwound areas.  He had also recommended that he apply to the insurance company for approval to use Humira.  However the patient chose to hold off on pursuing that option for now.      Since her last clinic visit with me she is continue to follow the dermatologist recommendations regarding treatment of the hidradenitis suppurativa.  She is continue to take the antibiotics and use the clobetasol cream to the axilla areas.    Recently there has been little to no drainage from either of her axilla and she has just been applying a dry gauze to each area.      The pateint denies fevers or chills.  They report the pain from the wound has been 0/10 and has remained about the same recently.      Today the patient reports maintaining a high protein diet and taking protein supplements regularly.        She does not have diabetes and does not smoke cigarettes.  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        The patient has recently had some symptoms of wound infection and is currently taking antibiotics which they have been tolerating well with no symptoms of an adverse reaction.       Problem List:   Past Medical History:   Diagnosis Date    Diastolic  dysfunction 2/24/2014              Family Hx: family history is not on file.       Surgical Hx:   Past Surgical History:   Procedure Laterality Date    BIOPSY  2005    COLONOSCOPY  05/28/2021          Allergies:    Allergies   Allergen Reactions    Iodine Hives, Rash and Other (See Comments)     Patient reports this allergy is because she is allergic to shrimp      Shellfish-Derived Products Difficulty breathing and Hives     Shrimp      Tetanus Toxoids               Medication History:    Current Outpatient Medications   Medication Sig    Calcium Citrate-Vitamin D3 315-6.25 MG-MCG TABS Take 2 tablets by mouth    clobetasol (TEMOVATE) 0.05 % external ointment Apply topically 2 times daily    entecavir (BARACLUDE) 0.5 MG tablet Take 1 tablet (0.5 mg) by mouth daily    fluticasone-vilanterol (BREO ELLIPTA) 100-25 MCG/ACT inhaler TAKE 1 PUFF BY MOUTH EVERY DAY (Patient not taking: Reported on 9/7/2023)    ibuprofen (ADVIL/MOTRIN) 600 MG tablet Take 1 tablet by mouth 4 times daily as needed    mycophenolate (GENERIC EQUIVALENT) 500 MG tablet Take 3 tablets (1,500 mg) by mouth daily Labs every 8-12 weeks.    mycophenolate (GENERIC EQUIVALENT) 500 MG tablet Take 1,500 mg by mouth    sulfamethoxazole-trimethoprim (BACTRIM DS) 800-160 MG tablet Take 1 tablet by mouth 2 times daily    Vitamin D3 (VITAMIN D, CHOLECALCIFEROL,) 25 mcg (1000 units) tablet Take by mouth daily     No current facility-administered medications for this encounter.         Tobacco History:  reports that she has never smoked. She has never used smokeless tobacco.       REVIEW OF SYMPTOMS:   The review of systems was negative except as noted in the HPI.           PHYSICAL EXAMINATION:     /65 (BP Location: Left arm, Patient Position: Sitting, Cuff Size: Adult Regular)   Pulse 76   Temp 96.8  F (36  C) (Temporal)            GENERAL: The patient overall appears well and is no acute distress.   HEAD: normocephalic   EYES: Sclera and conjunctiva  clear   NECK: no obvious masses   LUNGS: breathing is unlabored.   EXTREMITIES: No clubbing, cyanosis or edema   SKIN: No rashes or other abnormalities except as noted under the Wound section below.   NEUROLOGICAL: normal motor and sensory function       WOUND/ulcer: Healed      Also see below for wound details:     Circumferential volume measures:             No data to display                Ulceration(s)/Wound(s):   Please see the media tab under the chart review for pictures of the wounds.  Nursing staff removed dressings and cleansed wound.    Wound (used by OP I only) 07/11/23 1425 Right medial axilla other (see comments) (Active)   Thickness/Stage full thickness 10/10/23 1042   Base epithelialization;pink 10/10/23 1042   Periwound intact 10/10/23 1042   Periwound Temperature warm 10/10/23 1042   Periwound Skin Turgor soft 10/10/23 1042   Edges open 10/10/23 1042   Length (cm) 0.1 10/10/23 1042   Width (cm) 0.1 10/10/23 1042   Depth (cm) 0.1 10/10/23 1042   Wound (cm^2) 0.01 cm^2 10/10/23 1042   Wound Volume (cm^3) 0 cm^3 10/10/23 1042   Wound healing % 97.92 10/10/23 1042   Drainage Characteristics/Odor serosanguineous 10/10/23 1042   Drainage Amount moderate 10/10/23 1042   Care, Wound non-select wound debridement performed 10/10/23 1042       Wound (used by OP I only) 10/10/23 1047 Right lateral axilla other (see comments) (Active)   Thickness/Stage full thickness 10/10/23 1042   Base pink;white 10/10/23 1042   Periwound intact 10/10/23 1042   Periwound Temperature warm 10/10/23 1042   Periwound Skin Turgor soft 10/10/23 1042   Length (cm) 0.1 10/10/23 1042   Width (cm) 0.1 10/10/23 1042   Depth (cm) 0.1 10/10/23 1042   Wound (cm^2) 0.01 cm^2 10/10/23 1042   Wound Volume (cm^3) 0 cm^3 10/10/23 1042   Drainage Characteristics/Odor serosanguineous 10/10/23 1042   Drainage Amount moderate 10/10/23 1042   Care, Wound non-select wound debridement performed 10/10/23 1042               Recent Labs   Lab Test  06/08/23  1216 02/13/23  1356   A1C 6.2* 5.9*          Recent Labs   Lab Test 06/08/23  1216 05/17/23  0738 04/12/23  0950   ALBUMIN 4.2 4.0 4.3              No debridement performed today.                  ASSESSMENT:   This is a 53 year old  female with healed bilateral axillary wounds.          PLAN:   No further bandages are required.  Strongly encouraged her to continue to work with the dermatologist to treat the underlying skin condition.  I have encouraged the patient to continue on their high protein diet to aid in wound healing.   The patient will return to the wound clinic on an as-needed basis if further wounds develop.        20 minutes spent on the date of the encounter doing chart review, history and exam, documentation and further activities per the note, this time excludes any procedure time      Luis Enrique Yarbrough MD  10/10/2023   11:17 AM   [facility] Vascular/Wound  [phone]    This note was electronically signed by Luis Enrique Yarbrough MD        Further instructions from your care team         Angelica Jett      1970    A DME order was not completed because supplies were not needed  Dressing changes outside of clinic are being performed by Patient     Plan:  Continue to follow with Dermatology. Next follow up with them in a few months.     Left Axilla and Right Axilla Cares  Monitor sites daily and okay to keep open to air  Continue your same Clobetasol as prescribed by your Dermatologist  Okay to place gauze to sites if drainage noted  If wounds start to reopen or you have other questions or concerns, please call the [facility] at [phone]     Protein:  A diet high in protein is important for wound healing, we recommend getting 90 grams of protein per day. Taking protein shakes or bars are a good way to get extra protein in your diet.  Good sources of protein:  Pork 26g per 3 oz  Whey protein powder - 24g per scoop (on average)  Greek yogurt - 23g per  8oz  Chicken or Turkey - 23g per 3oz  Fish - 20-25g per 3oz  Beef - 18-23g per 3oz  Navy beans - 20g per cup  Cottage cheese - 14g per 1/2 cup  Lentils - 13g per 1/4 cup  Beef jerky 13g per 1oz  2% milk - 8g per cup  Peanut butter - 8g per 2 tablespoons  Eggs - 6g per egg  Mixed nuts - 6g per 2oz      Luis Enrique Yarbrough M.D. October 10, 2023  Call us at [phone] if you have any questions about your wounds, have redness or swelling around your wound, have a fever of 101 degrees Fahrenheit or greater or if you have any other problems or concerns. We answer the phone Monday through Friday 8 am to 4 pm, please leave a message as we check the voicemail frequently throughout the day.     If you had a positive experience please indicate that on your patient satisfaction survey form that [facility] will be sending you.    It was a pleasure meeting with you today.  Thank you for allowing me and my team the privilege of caring for you today.  YOU are the reason we are here, and I truly hope we provided you with the excellent service you deserve.  Please let us know if there is anything else we can do for you so that we can be sure you are leaving completely satisfied with your care experience.      If you have any billing related questions please call the [facility] Business office at [phone]. The clinic staff does not handle billing related matters.    If you are scheduled to have a follow up appointment, you will receive a reminder call the day before your visit. On the appointment day please arrive 15 minutes prior to your appointment time. If you are unable to keep that appointment, please call the clinic to cancel or reschedule. If you are more than 10 minutes late or greater for your scheduled appointment time, the clinic policy is that you may be asked to reschedule.        ,

## 2023-10-12 ENCOUNTER — LAB (OUTPATIENT)
Dept: LAB | Facility: CLINIC | Age: 53
End: 2023-10-12
Payer: COMMERCIAL

## 2023-10-12 ENCOUNTER — OFFICE VISIT (OUTPATIENT)
Dept: RHEUMATOLOGY | Facility: CLINIC | Age: 53
End: 2023-10-12
Attending: INTERNAL MEDICINE
Payer: COMMERCIAL

## 2023-10-12 VITALS
DIASTOLIC BLOOD PRESSURE: 70 MMHG | HEART RATE: 72 BPM | BODY MASS INDEX: 25.73 KG/M2 | OXYGEN SATURATION: 97 % | SYSTOLIC BLOOD PRESSURE: 106 MMHG | WEIGHT: 159.4 LBS

## 2023-10-12 DIAGNOSIS — Z51.81 MEDICATION MONITORING ENCOUNTER: ICD-10-CM

## 2023-10-12 DIAGNOSIS — J84.9 ILD (INTERSTITIAL LUNG DISEASE) (H): Primary | ICD-10-CM

## 2023-10-12 DIAGNOSIS — J84.9 ILD (INTERSTITIAL LUNG DISEASE) (H): ICD-10-CM

## 2023-10-12 LAB
ALBUMIN SERPL BCG-MCNC: 4.3 G/DL (ref 3.5–5.2)
ALT SERPL W P-5'-P-CCNC: 20 U/L (ref 0–50)
AST SERPL W P-5'-P-CCNC: 25 U/L (ref 0–45)
BASO+EOS+MONOS # BLD AUTO: ABNORMAL 10*3/UL
BASO+EOS+MONOS NFR BLD AUTO: ABNORMAL %
BASOPHILS # BLD AUTO: 0.1 10E3/UL (ref 0–0.2)
BASOPHILS NFR BLD AUTO: 1 %
CREAT SERPL-MCNC: 1.02 MG/DL (ref 0.51–0.95)
EGFRCR SERPLBLD CKD-EPI 2021: 65 ML/MIN/1.73M2
EOSINOPHIL # BLD AUTO: 0.2 10E3/UL (ref 0–0.7)
EOSINOPHIL NFR BLD AUTO: 2 %
ERYTHROCYTE [DISTWIDTH] IN BLOOD BY AUTOMATED COUNT: 14.3 % (ref 10–15)
HCT VFR BLD AUTO: 46 % (ref 35–47)
HGB BLD-MCNC: 13.9 G/DL (ref 11.7–15.7)
IMM GRANULOCYTES # BLD: 0 10E3/UL
IMM GRANULOCYTES NFR BLD: 0 %
LYMPHOCYTES # BLD AUTO: 3.4 10E3/UL (ref 0.8–5.3)
LYMPHOCYTES NFR BLD AUTO: 37 %
MCH RBC QN AUTO: 24.2 PG (ref 26.5–33)
MCHC RBC AUTO-ENTMCNC: 30.2 G/DL (ref 31.5–36.5)
MCV RBC AUTO: 80 FL (ref 78–100)
MONOCYTES # BLD AUTO: 0.7 10E3/UL (ref 0–1.3)
MONOCYTES NFR BLD AUTO: 8 %
NEUTROPHILS # BLD AUTO: 4.8 10E3/UL (ref 1.6–8.3)
NEUTROPHILS NFR BLD AUTO: 52 %
NRBC # BLD AUTO: 0 10E3/UL
NRBC BLD AUTO-RTO: 0 /100
PLATELET # BLD AUTO: 203 10E3/UL (ref 150–450)
RBC # BLD AUTO: 5.75 10E6/UL (ref 3.8–5.2)
WBC # BLD AUTO: 9.2 10E3/UL (ref 4–11)

## 2023-10-12 PROCEDURE — 84460 ALANINE AMINO (ALT) (SGPT): CPT | Performed by: PATHOLOGY

## 2023-10-12 PROCEDURE — 36415 COLL VENOUS BLD VENIPUNCTURE: CPT | Performed by: PATHOLOGY

## 2023-10-12 PROCEDURE — 85025 COMPLETE CBC W/AUTO DIFF WBC: CPT | Performed by: PATHOLOGY

## 2023-10-12 PROCEDURE — 82565 ASSAY OF CREATININE: CPT | Performed by: PATHOLOGY

## 2023-10-12 PROCEDURE — 99213 OFFICE O/P EST LOW 20 MIN: CPT | Performed by: INTERNAL MEDICINE

## 2023-10-12 PROCEDURE — 99214 OFFICE O/P EST MOD 30 MIN: CPT | Mod: GC | Performed by: INTERNAL MEDICINE

## 2023-10-12 PROCEDURE — 82040 ASSAY OF SERUM ALBUMIN: CPT | Performed by: PATHOLOGY

## 2023-10-12 PROCEDURE — 84450 TRANSFERASE (AST) (SGOT): CPT | Performed by: PATHOLOGY

## 2023-10-12 ASSESSMENT — PAIN SCALES - GENERAL: PAINLEVEL: NO PAIN (0)

## 2023-10-12 NOTE — LETTER
10/12/2023       RE: Angelica Jett  [address] [place] [address]     Dear Colleague,    Thank you for referring your patient, Angelica Jett, to the [facility]HEUMATOLOGY [facility] [place] [facility]. Please see a copy of my visit note below.    Rheumatology Clinic Follow Up  Date of Service 10/12/2023  Last Visit: 4/12/23    Reason for visit: ILD/polymyositis 6 month follow up    HPI:    4/12/23  Angelica Jett is a 52 year old female with a history of  chronic hepatitis B (on Entrcavir), GERD, glucocorticoid induced osteoporosis, polymyositis with interstitial lung disease with irritable bowel syndrome who presents to rheumatology to establish care. [place]. Patient was initially diagnosed with polymyositis and interstitial lung disease in 2005, after presenting with severe bilateral upper and lower extremity proximal muscle weakness. She reports first noticing shortness of breath after giving birth to her baby in 2003 with weakness noted in 2004 after she noticed she could no longer lift her son. She did not have new rashes or changes in mechanics of hands on presentation or later through out the disease course. She had undergone MRI muscles but has not had a muscle biopsy. She was initially under the care of Dr. Alan at [facility] in [place] and later at [facility]. Patient reports initially starting treatment with CellCept 1000 mg twice daily and prednisone 60 mg daily. Since diagnosis in 2005 she was able to taper down her prednisone successfully and was able to stop prednisone 1 mg in February 2023. She recalls one flare of polymyositis on prednisone taper in 2009 but didn't experience any additional flare on continued taper. Patient is currently doing well on Cellcept 1500 mg  Daily. Cellcept dose was decreased given stability in PFTs. She is not having any issues  with muscle weakness. She doesn't feel shortness of breath on regular activity but experiences dyspnea on exertional activity. She sometimes uses supplemental oxygen on exertional activity. Patient was listed on transplant list but her condition stabilized with immunosuppressive therapy. Apparently she has also been receiving pulmonary rehab with improvement in her symptoms. Patient has hx of chronic hepatitis B on Entecavir - she has negative Hep B e antigen with positive hep B e antibody and low titer HBV quant < 10/ ml. She has scheduled to follow up with hepatology. She has normal renal function. Patient has hx osteoporosis secondary to glucocorticoid use. She was previously on Reclast x 3 yrs but off since 2016. Patient reports intermittent hx of paresthesias in her feet. She also had diagnosis of pulmonary hypertension in 2012 and required Adcirca and revatio that was apparently discontinued given improvement in pulmonary pressures on right heart cath in 2019.  She is tolerating Cellcept well. She also has hx of osteoporosis secondary to glucocorticoid us. She was previously on n Reclast for 3 years but has been off all therapy since about 2016. She continues to take calcium and vitamin D. Last DEXA ~5 yrs ago. She hasn't had repeat Dexa scan. She was previously working as . Denies family hx of autoimmune conditions, no smoking or drinking hx in past.     Today 10/12/2023  Doing well on current dose cellcept. No new rashes, weakness, stiffness, or dyspnea. Her BRITTNEY and myositis panel was all negative. Possible pulmonary artery hypertension based on CT read 6 months ago.  Rest of ROS is negative except in HPI     Janett Alan, Medical Student on 10/12/2023 at 10:41 AM        Past Medical History  Reviewed, per HPI  Past Surgical History:   Procedure Laterality Date    BIOPSY  2005    COLONOSCOPY  05/28/2021     Social History     Socioeconomic History    Marital status:      Spouse name:  Not on file    Number of children: Not on file    Years of education: Not on file    Highest education level: Not on file   Occupational History    Not on file   Tobacco Use    Smoking status: Never    Smokeless tobacco: Never   Vaping Use    Vaping Use: Never used   Substance and Sexual Activity    Alcohol use: Never    Drug use: Never    Sexual activity: Yes     Partners: Male     Birth control/protection: None   Other Topics Concern    Parent/sibling w/ CABG, MI or angioplasty before 65F 55M? No   Social History Narrative    Not on file     Social Determinants of Health     Financial Resource Strain: Not on file   Food Insecurity: Not on file   Transportation Needs: Not on file   Physical Activity: Not on file   Stress: Not on file   Social Connections: Not on file   Interpersonal Safety: Not on file   Housing Stability: Not on file     FHx: Reviewed, per HPI    Medications:  Current Outpatient Medications   Medication    Calcium Citrate-Vitamin D3 315-6.25 MG-MCG TABS    clobetasol (TEMOVATE) 0.05 % external ointment    entecavir (BARACLUDE) 0.5 MG tablet    ibuprofen (ADVIL/MOTRIN) 600 MG tablet    mycophenolate (GENERIC EQUIVALENT) 500 MG tablet    mycophenolate (GENERIC EQUIVALENT) 500 MG tablet    sulfamethoxazole-trimethoprim (BACTRIM DS) 800-160 MG tablet    Vitamin D3 (VITAMIN D, CHOLECALCIFEROL,) 25 mcg (1000 units) tablet    fluticasone-vilanterol (BREO ELLIPTA) 100-25 MCG/ACT inhaler     No current facility-administered medications for this visit.       Allergies:     Allergies   Allergen Reactions    Iodine Hives, Rash and Other (See Comments)     Patient reports this allergy is because she is allergic to shrimp      Shellfish-Derived Products Difficulty breathing and Hives     Shrimp      Tetanus Toxoids        /70 (BP Location: Right arm, Patient Position: Sitting, Cuff Size: Adult Regular)   Pulse 72   Wt 72.3 kg (159 lb 6.4 oz)   SpO2 97%   BMI 25.73 kg/m       Physical  Exam:  Constitutional: well-developed, appearing stated age; cooperative,  Skin: no nail pitting, alopecia, rash, nodules or lesions. Healing HS underarms bilaterally, almost completely resolved  Eyes: nl EOM, vision, conjunctiva, sclera,   Resp: fine crackles bilaterally, breathing comfortably on room air  CV: RRR, no murmurs, rubs or gallops, no edema  Lymph: no cervical, supraclavicular, or epitrochlear nodes  Neuro: nl cranial nerves, strength, sensation,   Psych: nl judgement, orientation, memory, affect.      Lab/Imaging: Reviewed recent labs and imaging.     Assessment/Plan:     1. ILD and polymyositis:    Angelica Jett is a 52 year old female with history with hx of polymyositis with ILD presents to establish care woth rheumatology at [facility]. The patient recently moved from [place] where she was following rheumatologist Dr. Trupti Ruiz at the the [facility]. She was diagnosed with ILD (UIP pattern based on lung biopsy) and polymyositis in 2005. She is currently on Cellcept 1500 mg once daily - Cellcept was decreased to this given stability in her pulmonary function.She has stopped taking Prednisone since February 2023. Patient has marked improvement in DLCO- recently followed up with pulmonary at [facility]. She is not requiring oxygen with her regular activities - this improvement could be due to both Cellcept and pulmonary rehab. She doesn't have any evidence of muscle weakness on our assessment. I couldn't find myositis serology in her charts - perhaps those were done at her initial diagnosis and we don't have access to her very old charts. Myositis panel and BRITTNEY negative. Plan to continue current dose of Cellcept as per pulmonary needs since she doesn't have any evidence of myositis other rheumatologic manifestation.      -Discuss Echo and cardiology follow up with pulmonologist   -Labs today and every 6 months  -Follow up in person in 1 year    2. Chronic Hepatitis B on Entecavir - patient has  referral to see hepatology     3. Hx of secondary PAH- previously on Revatio and Adcirc - Discontinued in 2019 based on improvement in her right heart cath in [place]     4. Osteoporosis secondary to steroid use. Now off of steroids. Continue Ca and vit D.      I saw and examined the patient with Dr. Ferrer. I acted as scribe for Dr. Ferrer.    Janett Alan MS    Attending Note: I saw and examined the patient with medical student Janett. This note was written by her, who acted as scribe for me. I agree with findings and recommendations written in this note. The note reflects decisions made by me.    Kimberley Ferrer MD        Plan:    Discuss 2D-Echo and cardiology follow up with your lung doctor    Labs today and every 6 months    Return in a year in person

## 2023-10-12 NOTE — NURSING NOTE
Chief Complaint   Patient presents with    RECHECK     /70 (BP Location: Right arm, Patient Position: Sitting, Cuff Size: Adult Regular)   Pulse 72   Wt 72.3 kg (159 lb 6.4 oz)   SpO2 97%   BMI 25.73 kg/m    Soledad HERNANDEZ

## 2023-10-12 NOTE — PROGRESS NOTES
Rheumatology Clinic Follow Up  Date of Service 10/12/2023  Last Visit: 4/12/23    Reason for visit: ILD/polymyositis 6 month follow up    HPI:    4/12/23  Angelica Jett is a 52 year old female with a history of  chronic hepatitis B (on Entrcavir), GERD, glucocorticoid induced osteoporosis, polymyositis with interstitial lung disease with irritable bowel syndrome who presents to rheumatology to establish care. [place] Patient was initially diagnosed with polymyositis and interstitial lung disease in 2005, after presenting with severe bilateral upper and lower extremity proximal muscle weakness. She reports first noticing shortness of breath after giving birth to her baby in 2003 with weakness noted in 2004 after she noticed she could no longer lift her son. She did not have new rashes or changes in mechanics of hands on presentation or later through out the disease course. She had undergone MRI muscles but has not had a muscle biopsy. She was initially under the care of Dr. Alan at [facility] in [place] and later at [facility]. Patient reports initially starting treatment with CellCept 1000 mg twice daily and prednisone 60 mg daily. Since diagnosis in 2005 she was able to taper down her prednisone successfully and was able to stop prednisone 1 mg in February 2023. She recalls one flare of polymyositis on prednisone taper in 2009 but didn't experience any additional flare on continued taper. Patient is currently doing well on Cellcept 1500 mg  Daily. Cellcept dose was decreased given stability in PFTs. She is not having any issues with muscle weakness. She doesn't feel shortness of breath on regular activity but experiences dyspnea on exertional activity. She sometimes uses supplemental oxygen on exertional activity. Patient was listed on transplant list but her condition stabilized with immunosuppressive therapy. Apparently she has also been  receiving pulmonary rehab with improvement in her symptoms. Patient has hx of chronic hepatitis B on Entecavir - she has negative Hep B e antigen with positive hep B e antibody and low titer HBV quant < 10/ ml. She has scheduled to follow up with hepatology. She has normal renal function. Patient has hx osteoporosis secondary to glucocorticoid use. She was previously on Reclast x 3 yrs but off since 2016. Patient reports intermittent hx of paresthesias in her feet. She also had diagnosis of pulmonary hypertension in 2012 and required Adcirca and revatio that was apparently discontinued given improvement in pulmonary pressures on right heart cath in 2019.  She is tolerating Cellcept well. She also has hx of osteoporosis secondary to glucocorticoid us. She was previously on n Reclast for 3 years but has been off all therapy since about 2016. She continues to take calcium and vitamin D. Last DEXA ~5 yrs ago. She hasn't had repeat Dexa scan. She was previously working as . Denies family hx of autoimmune conditions, no smoking or drinking hx in past.     Today 10/12/2023  Doing well on current dose cellcept. No new rashes, weakness, stiffness, or dyspnea. Her BRITTNEY and myositis panel was all negative. Possible pulmonary artery hypertension based on CT read 6 months ago.  Rest of ROS is negative except in HPI     Janett Alan, Medical Student on 10/12/2023 at 10:41 AM        Past Medical History  Reviewed, per HPI  Past Surgical History:   Procedure Laterality Date     BIOPSY  2005     COLONOSCOPY  05/28/2021     Social History     Socioeconomic History     Marital status:      Spouse name: Not on file     Number of children: Not on file     Years of education: Not on file     Highest education level: Not on file   Occupational History     Not on file   Tobacco Use     Smoking status: Never     Smokeless tobacco: Never   Vaping Use     Vaping Use: Never used   Substance and Sexual Activity      Alcohol use: Never     Drug use: Never     Sexual activity: Yes     Partners: Male     Birth control/protection: None   Other Topics Concern     Parent/sibling w/ CABG, MI or angioplasty before 65F 55M? No   Social History Narrative     Not on file     Social Determinants of Health     Financial Resource Strain: Not on file   Food Insecurity: Not on file   Transportation Needs: Not on file   Physical Activity: Not on file   Stress: Not on file   Social Connections: Not on file   Interpersonal Safety: Not on file   Housing Stability: Not on file     FHx: Reviewed, per HPI    Medications:  Current Outpatient Medications   Medication     Calcium Citrate-Vitamin D3 315-6.25 MG-MCG TABS     clobetasol (TEMOVATE) 0.05 % external ointment     entecavir (BARACLUDE) 0.5 MG tablet     ibuprofen (ADVIL/MOTRIN) 600 MG tablet     mycophenolate (GENERIC EQUIVALENT) 500 MG tablet     mycophenolate (GENERIC EQUIVALENT) 500 MG tablet     sulfamethoxazole-trimethoprim (BACTRIM DS) 800-160 MG tablet     Vitamin D3 (VITAMIN D, CHOLECALCIFEROL,) 25 mcg (1000 units) tablet     fluticasone-vilanterol (BREO ELLIPTA) 100-25 MCG/ACT inhaler     No current facility-administered medications for this visit.       Allergies:     Allergies   Allergen Reactions     Iodine Hives, Rash and Other (See Comments)     Patient reports this allergy is because she is allergic to shrimp       Shellfish-Derived Products Difficulty breathing and Hives     Shrimp       Tetanus Toxoids        /70 (BP Location: Right arm, Patient Position: Sitting, Cuff Size: Adult Regular)   Pulse 72   Wt 72.3 kg (159 lb 6.4 oz)   SpO2 97%   BMI 25.73 kg/m       Physical Exam:  Constitutional: well-developed, appearing stated age; cooperative,  Skin: no nail pitting, alopecia, rash, nodules or lesions. Healing HS underarms bilaterally, almost completely resolved  Eyes: nl EOM, vision, conjunctiva, sclera,   Resp: fine crackles bilaterally, breathing comfortably on  room air  CV: RRR, no murmurs, rubs or gallops, no edema  Lymph: no cervical, supraclavicular, or epitrochlear nodes  Neuro: nl cranial nerves, strength, sensation,   Psych: nl judgement, orientation, memory, affect.      Lab/Imaging: Reviewed recent labs and imaging.     Assessment/Plan:     1. ILD and polymyositis:    Angelica Jett is a 52 year old female with history with hx of polymyositis with ILD presents to establish care woth rheumatology at [facility]. The patient recently moved from [place] where she was following rheumatologist Dr. Trupti Ruiz at the the [facility]. She was diagnosed with ILD (UIP pattern based on lung biopsy) and polymyositis in 2005. She is currently on Cellcept 1500 mg once daily - Cellcept was decreased to this given stability in her pulmonary function.She has stopped taking Prednisone since February 2023. Patient has marked improvement in DLCO- recently followed up with pulmonary at [facility]. She is not requiring oxygen with her regular activities - this improvement could be due to both Cellcept and pulmonary rehab. She doesn't have any evidence of muscle weakness on our assessment. I couldn't find myositis serology in her charts - perhaps those were done at her initial diagnosis and we don't have access to her very old charts. Myositis panel and BRITTNEY negative. Plan to continue current dose of Cellcept as per pulmonary needs since she doesn't have any evidence of myositis other rheumatologic manifestation.      -Discuss Echo and cardiology follow up with pulmonologist   -Labs today and every 6 months  -Follow up in person in 1 year    2. Chronic Hepatitis B on Entecavir - patient has referral to see hepatology     3. Hx of secondary PAH- previously on Revatio and Adcirc - Discontinued in 2019 based on improvement in her right heart cath in [place]     4. Osteoporosis secondary to steroid use. Now off of steroids. Continue Ca and vit D.      I saw and examined the patient with  Dr. Ferrer. I acted as scribe for Dr. Ferrer.    Janett Alan MS    Attending Note: I saw and examined the patient with medical student Janett. This note was written by her, who acted as scribe for me. I agree with findings and recommendations written in this note. The note reflects decisions made by me.    Kimberley Ferrer MD        Plan:    Discuss 2D-Echo and cardiology follow up with your lung doctor    Labs today and every 6 months    Return in a year in person

## 2023-10-12 NOTE — PATIENT INSTRUCTIONS
Discuss 2D-Echo and cardiology follow up with your lung doctor    Labs today and every 6 months    Return in a year in person

## 2023-11-09 DIAGNOSIS — B18.1 CHRONIC VIRAL HEPATITIS B WITHOUT DELTA AGENT AND WITHOUT COMA (H): Primary | ICD-10-CM

## 2023-11-10 ENCOUNTER — OFFICE VISIT (OUTPATIENT)
Dept: PULMONOLOGY | Facility: CLINIC | Age: 53
End: 2023-11-10
Attending: INTERNAL MEDICINE
Payer: COMMERCIAL

## 2023-11-10 ENCOUNTER — LAB (OUTPATIENT)
Dept: LAB | Facility: CLINIC | Age: 53
End: 2023-11-10
Payer: COMMERCIAL

## 2023-11-10 VITALS
HEART RATE: 78 BPM | SYSTOLIC BLOOD PRESSURE: 111 MMHG | OXYGEN SATURATION: 99 % | HEIGHT: 66 IN | DIASTOLIC BLOOD PRESSURE: 71 MMHG | RESPIRATION RATE: 16 BRPM | BODY MASS INDEX: 25.88 KG/M2 | WEIGHT: 161 LBS

## 2023-11-10 DIAGNOSIS — Z23 IMMUNIZATION DUE: ICD-10-CM

## 2023-11-10 DIAGNOSIS — J84.9 ILD (INTERSTITIAL LUNG DISEASE) (H): Primary | ICD-10-CM

## 2023-11-10 DIAGNOSIS — J84.9 ILD (INTERSTITIAL LUNG DISEASE) (H): ICD-10-CM

## 2023-11-10 DIAGNOSIS — N18.2 CKD (CHRONIC KIDNEY DISEASE) STAGE 2, GFR 60-89 ML/MIN: ICD-10-CM

## 2023-11-10 DIAGNOSIS — M33.20 POLYMYOSITIS (H): ICD-10-CM

## 2023-11-10 DIAGNOSIS — B18.1 CHRONIC VIRAL HEPATITIS B WITHOUT DELTA AGENT AND WITHOUT COMA (H): ICD-10-CM

## 2023-11-10 LAB
ALBUMIN SERPL BCG-MCNC: 4.4 G/DL (ref 3.5–5.2)
ALP SERPL-CCNC: 76 U/L (ref 35–104)
ALT SERPL W P-5'-P-CCNC: 16 U/L (ref 0–50)
ANION GAP SERPL CALCULATED.3IONS-SCNC: 10 MMOL/L (ref 7–15)
AST SERPL W P-5'-P-CCNC: 28 U/L (ref 0–45)
BASOPHILS # BLD AUTO: 0 10E3/UL (ref 0–0.2)
BASOPHILS NFR BLD AUTO: 0 %
BILIRUB DIRECT SERPL-MCNC: <0.2 MG/DL (ref 0–0.3)
BILIRUB SERPL-MCNC: 0.3 MG/DL
BUN SERPL-MCNC: 17 MG/DL (ref 6–20)
CALCIUM SERPL-MCNC: 9.6 MG/DL (ref 8.6–10)
CHLORIDE SERPL-SCNC: 101 MMOL/L (ref 98–107)
CREAT SERPL-MCNC: 1.02 MG/DL (ref 0.51–0.95)
CYSTATIN C (ROCHE): 1 MG/L (ref 0.6–1)
DEPRECATED HCO3 PLAS-SCNC: 26 MMOL/L (ref 22–29)
EGFRCR SERPLBLD CKD-EPI 2021: 65 ML/MIN/1.73M2
EOSINOPHIL # BLD AUTO: 0.2 10E3/UL (ref 0–0.7)
EOSINOPHIL NFR BLD AUTO: 2 %
ERYTHROCYTE [DISTWIDTH] IN BLOOD BY AUTOMATED COUNT: 14.1 % (ref 10–15)
GFR SERPL CREATININE-BSD FRML MDRD: 74 ML/MIN/1.73M2
GLUCOSE SERPL-MCNC: 81 MG/DL (ref 70–99)
HBV DNA SERPL NAA+PROBE-ACNC: NOT DETECTED IU/ML
HCT VFR BLD AUTO: 44 % (ref 35–47)
HGB BLD-MCNC: 13.5 G/DL (ref 11.7–15.7)
IMM GRANULOCYTES # BLD: 0.1 10E3/UL
IMM GRANULOCYTES NFR BLD: 1 %
INR PPP: 1.05 (ref 0.85–1.15)
LYMPHOCYTES # BLD AUTO: 2.9 10E3/UL (ref 0.8–5.3)
LYMPHOCYTES NFR BLD AUTO: 36 %
MCH RBC QN AUTO: 24.4 PG (ref 26.5–33)
MCHC RBC AUTO-ENTMCNC: 30.7 G/DL (ref 31.5–36.5)
MCV RBC AUTO: 80 FL (ref 78–100)
MONOCYTES # BLD AUTO: 0.6 10E3/UL (ref 0–1.3)
MONOCYTES NFR BLD AUTO: 8 %
NEUTROPHILS # BLD AUTO: 4.1 10E3/UL (ref 1.6–8.3)
NEUTROPHILS NFR BLD AUTO: 53 %
NRBC # BLD AUTO: 0 10E3/UL
NRBC BLD AUTO-RTO: 0 /100
PLATELET # BLD AUTO: 194 10E3/UL (ref 150–450)
POTASSIUM SERPL-SCNC: 4 MMOL/L (ref 3.4–5.3)
PROT SERPL-MCNC: 8.3 G/DL (ref 6.4–8.3)
RBC # BLD AUTO: 5.53 10E6/UL (ref 3.8–5.2)
SODIUM SERPL-SCNC: 137 MMOL/L (ref 135–145)
WBC # BLD AUTO: 7.8 10E3/UL (ref 4–11)

## 2023-11-10 PROCEDURE — G0008 ADMIN INFLUENZA VIRUS VAC: HCPCS | Performed by: INTERNAL MEDICINE

## 2023-11-10 PROCEDURE — 99000 SPECIMEN HANDLING OFFICE-LAB: CPT | Performed by: PATHOLOGY

## 2023-11-10 PROCEDURE — 36415 COLL VENOUS BLD VENIPUNCTURE: CPT | Performed by: PATHOLOGY

## 2023-11-10 PROCEDURE — 99215 OFFICE O/P EST HI 40 MIN: CPT | Mod: 25 | Performed by: INTERNAL MEDICINE

## 2023-11-10 PROCEDURE — 94729 DIFFUSING CAPACITY: CPT | Performed by: INTERNAL MEDICINE

## 2023-11-10 PROCEDURE — 94726 PLETHYSMOGRAPHY LUNG VOLUMES: CPT | Performed by: INTERNAL MEDICINE

## 2023-11-10 PROCEDURE — 250N000011 HC RX IP 250 OP 636: Performed by: INTERNAL MEDICINE

## 2023-11-10 PROCEDURE — 94375 RESPIRATORY FLOW VOLUME LOOP: CPT | Performed by: INTERNAL MEDICINE

## 2023-11-10 PROCEDURE — 99213 OFFICE O/P EST LOW 20 MIN: CPT | Mod: 25 | Performed by: INTERNAL MEDICINE

## 2023-11-10 PROCEDURE — 85025 COMPLETE CBC W/AUTO DIFF WBC: CPT | Performed by: PATHOLOGY

## 2023-11-10 PROCEDURE — 90686 IIV4 VACC NO PRSV 0.5 ML IM: CPT | Performed by: INTERNAL MEDICINE

## 2023-11-10 PROCEDURE — 85610 PROTHROMBIN TIME: CPT | Performed by: PATHOLOGY

## 2023-11-10 PROCEDURE — 82610 CYSTATIN C: CPT | Performed by: INTERNAL MEDICINE

## 2023-11-10 PROCEDURE — 80053 COMPREHEN METABOLIC PANEL: CPT | Performed by: PATHOLOGY

## 2023-11-10 PROCEDURE — 82248 BILIRUBIN DIRECT: CPT | Performed by: PATHOLOGY

## 2023-11-10 PROCEDURE — 87517 HEPATITIS B DNA QUANT: CPT | Performed by: PHYSICIAN ASSISTANT

## 2023-11-10 RX ADMIN — INFLUENZA A VIRUS A/VICTORIA/4897/2022 IVR-238 (H1N1) ANTIGEN (FORMALDEHYDE INACTIVATED), INFLUENZA A VIRUS A/DARWIN/9/2021 SAN-010 (H3N2) ANTIGEN (FORMALDEHYDE INACTIVATED), INFLUENZA B VIRUS B/PHUKET/3073/2013 ANTIGEN (FORMALDEHYDE INACTIVATED), AND INFLUENZA B VIRUS B/MICHIGAN/01/2021 ANTIGEN (FORMALDEHYDE INACTIVATED) 0.5 ML: 15; 15; 15; 15 INJECTION, SUSPENSION INTRAMUSCULAR at 09:36

## 2023-11-10 ASSESSMENT — PAIN SCALES - GENERAL: PAINLEVEL: NO PAIN (0)

## 2023-11-10 NOTE — LETTER
11/10/2023         RE: Angelica Jett  [address]  [place] [address]        Dear Colleague,    Thank you for referring your patient, Angelica Jett, to the [facility] FOR LUNG SCIENCE AND [facility] CLINIC [place]. Please see a copy of my visit note below.    [facility] Interstitial Lung Disease [facility]    Reason for Visit  Angelica Jett is a 52 year old year old female who is being seen for known polymyositis related ILD.   HPI  Patient is 52 years old female [place] was in usual state of health until she delivered a healthy child back in 2005 and developed interstitial lung disease and associated muscle pain.  At the time, she reportedly had a lung biopsy in [place] which in association with polymyositis clinical diagnosis [reportedly had thigh MRI], received a diagnosis of ILD associated with polymyositis.  Her main immunosuppressive regimen has been mycophenolate mofetil, which has been decreased back in May 2022 to 1500 mg/day which appears to be due to PFT stability and symptom improvement.  Earlier in the disease, lung transplant was considered as she was needing oxygen, but as she responded to immunosuppression lung transplant has been deferred.  She has also been receiving pulmonary rehab with significant improvement in symptoms.  She admits to some interruption in her mycophenolate and with that her symptoms worsened back in 2022.  She also has known hepatitis B receiving Entecavir with normal liver function as of late 2022.  She works as a  and has no occupational exposures.  She is a lifetime non-smoker and denies secondhand smoke exposure.  She denies triggers for hypersensitivity pneumonitis.  She also had pulmonary hypertension (2013) and is needed Adcirca and revatio, but they were discontinued based on improvement in her pulmonary pressures on right heart cath 2019.    Updates from 8/18/23  Patient returns for follow up. Has  "seen rheumatology and dermatology, her most active problem now is Hidradenitis suppurativa with ulceration, received bactrim with no improvement, has been seeing wound clinic and her wound is starting to heal. She was offered humira and we supported this decision but she remains very reluctant to start it, concerned about pulmonary side effects. Her level of activity is unchanged over the last 6 months. She completed pulmonary rheb and feels benefit. She uses O2 at 2 LPM with exercise. She is currently seeking a job (finance).     Update from today 11/10/23:      Vitals: /71   Pulse 78   Resp 16   Ht 1.676 m (5' 6\")   Wt 73 kg (161 lb)   SpO2 99%   BMI 25.99 kg/m            Exam:   GENERAL APPEARANCE: Well developed, well nourished, alert, and in no apparent distress.  HEENT: Normal oral mucus membranes, tongue and dentition. Normal nose.   RESP: good air flow throughout.  Fine inspiratory rales more pronounced in the bases.  CV: Normal S1, S2, regular rhythm, normal rate. No murmur.  No LE edema.   MS: extremities normal. No clubbing. No cyanosis.  SKIN: no rash on limited exam.  NEURO: Mentation intact, speech normal, normal gait and stance.  PSYCH: mentation appears normal. and affect normal/bright.    Results:    Chest imaging:  Reviewed high-resolution CT chest images from today 2/1/2023 with the patient and compared to May 2021  Unchanged typical UIP pattern with extensive honeycombing in both lower lobes, and subpleural distribution tracking up the upper lobes.  The CT chest from May 2021 did show mild diffuse groundglass opacities, favoring pulmonary edema at the time.      PFTs 11/10/23        My interpretation: Stable Moderate to severe intrathoracic restrictive disease    PFTs from outside May 2022  FVC 1.91  FEV1 1.61  FEV1/FVC 84  TLC 2.98  DLCO 7.74    PFTs from outside May 2021  FVC 1.86  FEV1 1.51  FEV1/FVC 81  TLC 3.22  DLCO 5.58    PFTs from outside May 2019  FEV1 1.51-59%, FVC " 1.92-61%  TLC 2.15-41%  DLCO 31%    Six mins walk on 2 LPM, no desaturation below 89%:      Echo from outside showed mild RV dysfunction and pulmonary artery systolic pressure 20-25  She has history of pulmonary arterial hypertension that followed pregnancy 2005, however a repeat right heart cath in 2019 showed no residual pulm hypertension    Right heart cath May 2018  RA 3  RV 35/8  PA 36/15 [23] from a previous mean high of 47 in 2011, but PCWP was 27 at the time  PCWP 11  PVR 2.2    Previous pulmonary hypertension therapies  Adcirca and revatio to 2013    Assessment and plan:   ILD in polymyositis  Patient is 53 years old female with known history of polymyositis since 2005 following pregnancy, and lung biopsy at the time with established UIP pattern.  The patient has moved in from [place] in 2022.  Back in May 2022, her local pulmonologist at the time reduced her mycophenolate to 1000 mg in the morning and 500 in the evening, which appears to be due to PFT stability and symptomatic improvement. She does not recall having side effect from mycophenolate. DLCO continues to be stable today. She used to need oxygen at 4 L/min with activity, however she did not need O2 on six mins walk 6 months ago, and today requiring 2 LPM towards the end of the test. I also see marked improvement in DLCO followed by stability, which could be a combination effect of compliance with MMF and pulmonary rehab. She has completed pulmonary rehab recently. For UIP, we checked ANCA and anti-CCP and both were negative. We re-discussed Ofev today and she remains very reluctant given her hepatitis B infection, which is understandable. Her UIP might be stable for now but it can progress in the future.    Plan:  - Continue current MMF dose (1500 mg/d)  - MMF monitoring labs today   - Reconsider Ofev next visit   - Pulmonary rehab video link provided    - PFTs and six mins walk in 6 months, and if starting humira, need to come back in 3 months  with PFTs.   - Follow up with rheumatology     2. Hepatitis B on entecavir  Continue follow up with hepatology     3. Previous secondary PAH, revatio and adcirc discontinued in 2019 based on improvements in RHC    4. Immunization  Up to date on vaccines     Return in 6 months         Again, thank you for allowing me to participate in the care of your patient.        Sincerely,        Garrison Bustillo MD

## 2023-11-10 NOTE — NURSING NOTE
Chief Complaint   Patient presents with    Interstitial Lung Disease (ILD)     3 month follow up     Az Lynn CMA CMA at 8:58 AM on 11/10/2023

## 2023-11-10 NOTE — PROGRESS NOTES
[facility]    Reason for Visit  Angelica Jett is a 52 year old year old female who is being seen for known polymyositis related ILD.   HPI  Patient is 52 years old female [place] from [place] was in usual state of health until she delivered a healthy child back in 2005 and developed interstitial lung disease and associated muscle pain.  At the time, she reportedly had a lung biopsy in [place] which in association with polymyositis clinical diagnosis [reportedly had thigh MRI], received a diagnosis of ILD associated with polymyositis.  Her main immunosuppressive regimen has been mycophenolate mofetil, which has been decreased back in May 2022 to 1500 mg/day which appears to be due to PFT stability and symptom improvement.  Earlier in the disease, lung transplant was considered as she was needing oxygen, but as she responded to immunosuppression lung transplant has been deferred.  She has also been receiving pulmonary rehab with significant improvement in symptoms.  She admits to some interruption in her mycophenolate and with that her symptoms worsened back in 2022.  She also has known hepatitis B receiving Entecavir with normal liver function as of late 2022.  She works as a  and has no occupational exposures.  She is a lifetime non-smoker and denies secondhand smoke exposure.  She denies triggers for hypersensitivity pneumonitis.  She also had pulmonary hypertension (2013) and is needed Adcirca and revatio, but they were discontinued based on improvement in her pulmonary pressures on right heart cath 2019.    Updates from 8/18/23  Patient returns for follow up. Has seen rheumatology and dermatology, her most active problem now is Hidradenitis suppurativa with ulceration, received bactrim with no improvement, has been seeing wound clinic and her wound is starting to heal. She was offered humira and we supported this decision but she remains very  "reluctant to start it, concerned about pulmonary side effects. Her level of activity is unchanged over the last 6 months. She completed pulmonary rheb and feels benefit. She uses O2 at 2 LPM with exercise. She is currently seeking a job (finance).     Update from today 11/10/23:  Angelica returns today for follow up. She completed pulmonary rehab in July 2023 with marked improvement in exercise capacity. She denies current symptoms of infection or ILD exacerbation. Continues to tolerate MMF dosing at 1500 mg/day, with mild renal impairment that is unchanged (eGFR 65) as of last month. She is also receiving entecavir for hepatitis B. No recent hospitalizations.  She has started Humira around 1-2 months ago.     Vitals: /71   Pulse 78   Resp 16   Ht 1.676 m (5' 6\")   Wt 73 kg (161 lb)   SpO2 99%   BMI 25.99 kg/m            Exam:   GENERAL APPEARANCE: Well developed, well nourished, alert, and in no apparent distress.  HEENT: Normal oral mucus membranes, tongue and dentition. Normal nose.   RESP: Reduced air flow bilaterally.  Course inspiratory rales more pronounced in the bases and expiratory wheezes.  CV: Normal S1, S2, regular rhythm, normal rate. No murmur.  No LE edema.   MS: extremities normal. No clubbing. No cyanosis.  SKIN: no rash on limited exam.  NEURO: Mentation intact, speech normal, normal gait and stance.  PSYCH: mentation appears normal. and affect normal/bright.    Results:    Chest imaging:  Reviewed high-resolution CT chest images from today 2/1/2023 with the patient and compared to May 2021  Unchanged typical UIP pattern with extensive honeycombing in both lower lobes, and subpleural distribution tracking up the upper lobes.  The CT chest from May 2021 did show mild diffuse groundglass opacities, favoring pulmonary edema at the time.      PFTs 11/10/23        My interpretation: Stable Moderate to severe intrathoracic restrictive disease    PFTs from outside May 2022  FVC 1.91  FEV1 " 1.61  FEV1/FVC 84  TLC 2.98  DLCO 7.74    PFTs from outside May 2021  FVC 1.86  FEV1 1.51  FEV1/FVC 81  TLC 3.22  DLCO 5.58    PFTs from outside May 2019  FEV1 1.51-59%, FVC 1.92-61%  TLC 2.15-41%  DLCO 31%    Six mins walk on 2 LPM, no desaturation below 89%:      Echo from outside showed mild RV dysfunction and pulmonary artery systolic pressure 20-25  She has history of pulmonary arterial hypertension that followed pregnancy 2005, however a repeat right heart cath in 2019 showed no residual pulm hypertension    Right heart cath May 2018  RA 3  RV 35/8  PA 36/15 [23] from a previous mean high of 47 in 2011, but PCWP was 27 at the time  PCWP 11  PVR 2.2    Previous pulmonary hypertension therapies  Adcirca and revatio to 2013    Assessment and plan:   ILD in polymyositis on MMF and humira   Patient is 53 years old female with known history of polymyositis since 2005 following pregnancy, and lung biopsy at the time with established UIP pattern.  The patient has moved in from [place] in 2022.  Back in May 2022, her local pulmonologist at the time reduced her mycophenolate to 1000 mg in the morning and 500 in the evening, which appears to be due to PFT stability and symptomatic improvement. She does not recall having side effect from mycophenolate, but has mild renal impairment that we are monitoring. DLCO seems to be decreasing on today's PFT although still not clinically significant, and her symptoms are actually better after completion of pulmonary rehab recently. She used to need oxygen at 4 L/min with activity, however she did not need O2 on six mins walk last year, and 8/2023 requiring 2 LPM towards the end of the six mins walk. For UIP, we checked ANCA and anti-CCP and both were negative. In light of worsening TLC and DLCO, we re-discussed Ofev today and she remains very reluctant given her hepatitis B infection and concerned about (taking another pill). I reached out to hepatology after the visit and they are  ok with ofev with usual monitoring protocol. We also briefly discussed lung transplant referral and she was evaluated for it previously in [place] then she improved so she is wondering if she really needs to see our transplant team. I explained that her PFTs are certainly progressing so it will be helpful to have a lung transplant visit.   Plan:  - Continue current dose MMF 1000 qAM and 500 qHS  - CBC and CMP today then q3 months at local lab, if renal function recovers then we will increase MMF to 1000 BID   - Lung transplant referral   - Information on Ofev provided, instructed patient to call us once she decides on starting it. Hepatology provided their input that we can use Ofev with usual lab monitoring per protocol.   - Pulmonary rehab new referral for 2024 made   - PFTs in six months   - Follow up with rheumatology     2. Hepatitis B on entecavir  Continue follow up with hepatology     3. Previous secondary PAH, revatio and adcirca discontinued in 2019 based on improvements in RHC    4. Immunization  - Flu vaccine today     5. INGRID vs CKD, mild  - Add cystatin C to today's labs  - Nephrology referral     Return in 6 months   Garrison Bustillo MD  Total time spent on the day of the encounter was 45 minutes.

## 2023-11-11 LAB
DLCOUNC-%PRED-PRE: 46 %
DLCOUNC-PRE: 9.96 ML/MIN/MMHG
DLCOUNC-PRED: 21.27 ML/MIN/MMHG
ERV-%PRED-PRE: 52 %
ERV-PRE: 0.64 L
ERV-PRED: 1.22 L
EXPTIME-PRE: 4.54 SEC
FEF2575-%PRED-PRE: 52 %
FEF2575-PRE: 1.31 L/SEC
FEF2575-PRED: 2.5 L/SEC
FEFMAX-%PRED-PRE: 62 %
FEFMAX-PRE: 4.27 L/SEC
FEFMAX-PRED: 6.88 L/SEC
FEV1-%PRED-PRE: 52 %
FEV1-PRE: 1.39 L
FEV1FEV6-PRE: 81 %
FEV1FEV6-PRED: 82 %
FEV1FVC-PRE: 83 %
FEV1FVC-PRED: 81 %
FEV1SVC-PRE: 80 %
FEV1SVC-PRED: 75 %
FIFMAX-PRE: 4.97 L/SEC
FRCPLETH-%PRED-PRE: 66 %
FRCPLETH-PRE: 1.86 L
FRCPLETH-PRED: 2.81 L
FVC-%PRED-PRE: 51 %
FVC-PRE: 1.68 L
FVC-PRED: 3.29 L
IC-%PRED-PRE: 45 %
IC-PRE: 1.11 L
IC-PRED: 2.44 L
RVPLETH-%PRED-PRE: 64 %
RVPLETH-PRE: 1.22 L
RVPLETH-PRED: 1.9 L
TLCPLETH-%PRED-PRE: 56 %
TLCPLETH-PRE: 2.97 L
TLCPLETH-PRED: 5.27 L
VA-%PRED-PRE: 45 %
VA-PRE: 2.3 L
VC-%PRED-PRE: 49 %
VC-PRE: 1.75 L
VC-PRED: 3.53 L

## 2023-11-17 ENCOUNTER — ANCILLARY PROCEDURE (OUTPATIENT)
Dept: ULTRASOUND IMAGING | Facility: CLINIC | Age: 53
End: 2023-11-17
Attending: INTERNAL MEDICINE
Payer: COMMERCIAL

## 2023-11-17 ENCOUNTER — OFFICE VISIT (OUTPATIENT)
Dept: GASTROENTEROLOGY | Facility: CLINIC | Age: 53
End: 2023-11-17
Attending: PHYSICIAN ASSISTANT
Payer: COMMERCIAL

## 2023-11-17 VITALS
HEIGHT: 66 IN | WEIGHT: 159 LBS | HEART RATE: 81 BPM | SYSTOLIC BLOOD PRESSURE: 116 MMHG | BODY MASS INDEX: 25.55 KG/M2 | DIASTOLIC BLOOD PRESSURE: 70 MMHG

## 2023-11-17 DIAGNOSIS — B18.1 CHRONIC VIRAL HEPATITIS B WITHOUT DELTA AGENT AND WITHOUT COMA (H): Primary | ICD-10-CM

## 2023-11-17 DIAGNOSIS — B18.1 CHRONIC VIRAL HEPATITIS B WITHOUT DELTA AGENT AND WITHOUT COMA (H): ICD-10-CM

## 2023-11-17 PROCEDURE — 99213 OFFICE O/P EST LOW 20 MIN: CPT | Performed by: PHYSICIAN ASSISTANT

## 2023-11-17 PROCEDURE — 99214 OFFICE O/P EST MOD 30 MIN: CPT | Performed by: PHYSICIAN ASSISTANT

## 2023-11-17 PROCEDURE — 76700 US EXAM ABDOM COMPLETE: CPT | Mod: GC | Performed by: STUDENT IN AN ORGANIZED HEALTH CARE EDUCATION/TRAINING PROGRAM

## 2023-11-17 ASSESSMENT — PAIN SCALES - GENERAL: PAINLEVEL: NO PAIN (0)

## 2023-11-17 NOTE — PROGRESS NOTES
Hepatology Clinic note  Angelica Jett   Date of Birth 1970         Assessment/plan:   Angelica Jett is a 53 year old female with a PMH of polymyositis related ILD on MMF, chronic hepatitis B on Entecavir, hidradenitis suppurativa, and GERD presents for follow-up.      # Chronic hepatitis B on Entecavir, suppressed:   - Screening negative for HCC. US Abdomen pending at the time of visit   - FibroScan in 2021: showing Stage 0-1 fibrosis, 6.6 kilopascals  - Continue Entecavir treatment.  - Continue HCC screening every 6 months.  - Repeat labs in 6 months. Will add Hepatitis B antigen level to next lab set assess whether spontaneous clearance was achieved to determine if continued HCC screening is necessary. Ongoing antiviral treatment will be required regardless, as patient is immunosuppressed.    # Considering starting Humira for HS  - Approved from hepatology perspective  - Recommend regular monitoring of LFTs    # Follow-up in clinic in 1 year or sooner as needed  # Labs and US in 6 months    Mikayla Mendoza, MS3  [facility]    Justina Dinh PA-C   [facility] Hepatology clinic    Total time for E/M services performed on the date of the encounter 30 minutes.  This included review of previous: clinic visits, hospital records, lab results, imaging studies, and procedural documentation. Time also includes patient visit, documentation and discussion with other providers.  The findings from this review are summarized in the above note.     -----------------------------------------------------       HPI:   Angelica Jett is a 53 year old female with a history of polymyositis related ILD on MMF, hidradenitis suppurativa, and GERD who presents to clinic today for follow-up regarding chronic hepatitis B. She last saw Dr. Banks/Dr. Mclean on 5/17/2023.     Diagnosis in 2005 at [facility] (Dr. Yen Knox). Liver biopsy in 2005 demonstrated severe activity  "with moderate fibrosis. FibroScan 2021 at [facility] showing Stage 0-1, 6.6 kilopascals.     Hepatitis B surface antigen: not available for review  Hepatitis B positive core antibody: Reactive 07/07/21  Hepatitis B surface antibody level: <10.00 07/07/21  Hepatitis B DNA Quant: Not detected 11/10/23, not detected 04/12/23  Hepatitis B/E Antigen: Negative 11/18/21  - Risk for reactivation: Moderate d/t MMF and ?future Humira    -Prior treatments:  Adefovir 2005 - ?2012  Entecavir started 2014    Currently, patient reports feeling well. She has not been hospitalized or presented to the ED since her last hepatology visit in May of this year with Dr. Banks. She is currently being managed by dermatology for hidradenitis suppurativa, who is considering starting Humira for her HS. She expressed some reluctance due to concern for hepatotoxicity. She reports her dermatologist requested input from our team on potentially starting Humira.    Patient denies jaundice, lower extremity edema, abdominal distension or confusion.      Patient also denies melena, hematochezia or hematemesis.    Patient denies weight loss, fevers, sweats or chills.    0-2 BMs per day, soft. No diarrhea.    No alcohol or tobacco use.    Currently looking for a job in finance, moved to [place] last year.  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    Hepatitis B:   Lab Results   Component Value Date    HCVAB Nonreactive 04/12/2023     Lab Results   Component Value Date    HBQRES Not Detected 11/10/2023    HBQRES Not Detected 04/12/2023     No results found for: \"HBEABY\", \"HBEAGN\"    Recent Labs   Lab Test 11/10/23  1017 10/12/23  1038 06/08/23  1216 05/17/23  0738 04/12/23  0950 02/01/23  1002   ALKPHOS 76  --  93 91 97 86   ALT 16 20 23 19 19 17   AST 28 25 31 25 26 37*        Medical hx Surgical hx   Past Medical History:   Diagnosis Date    Diastolic dysfunction 2/24/2014   Polymyositis  ILD  Hidradenitis " "suppurativa  Chronic hepatitis B  Compensated cirrhosis Past Surgical History:   Procedure Laterality Date    BIOPSY  2005    COLONOSCOPY  05/28/2021                 Physical Exam:   VS:  /70   Pulse 81   Ht 1.676 m (5' 6\")   Wt 72.1 kg (159 lb)   BMI 25.66 kg/m        Gen- well, NAD, A+Ox3, normal color  CVS- Regular rate  RS- Occasional cough, nonproductive. No use of accessory muscles.  Abd- Non-distended, non-tender  Extr- hands normal, no YANIRA  Skin- no jaundice  Neuro- no asterixis  Psych- normal mood         Data:   Reviewed in person and significant for slight elevation in Cr to 1.02. Re-affirmed recommendation to visit with nephrology, has already scheduled for May.    Liver function labs are normal. Hepatitis B DNA is undetectable.    Lab Results   Component Value Date     11/10/2023      Lab Results   Component Value Date    POTASSIUM 4.0 11/10/2023     Lab Results   Component Value Date    CHLORIDE 101 11/10/2023     Lab Results   Component Value Date    CO2 26 11/10/2023     Lab Results   Component Value Date    BUN 17.0 11/10/2023     Lab Results   Component Value Date    CR 1.02 11/10/2023       Lab Results   Component Value Date    WBC 7.8 11/10/2023     Lab Results   Component Value Date    HGB 13.5 11/10/2023     Lab Results   Component Value Date    HCT 44.0 11/10/2023     Lab Results   Component Value Date    MCV 80 11/10/2023     Lab Results   Component Value Date     11/10/2023       Lab Results   Component Value Date    AST 28 11/10/2023     Lab Results   Component Value Date    ALT 16 11/10/2023     No results found for: \"BILICONJ\"   Lab Results   Component Value Date    BILITOTAL 0.3 11/10/2023       Lab Results   Component Value Date    ALBUMIN 4.4 11/10/2023     Lab Results   Component Value Date    PROTTOTAL 8.3 11/10/2023      Lab Results   Component Value Date    ALKPHOS 76 11/10/2023       Lab Results   Component Value Date    INR 1.05 11/10/2023         No " results found.    US Abdomen pending at the time of visit

## 2023-11-17 NOTE — LETTER
11/17/2023         RE: Angelica Jett  [address]  [place] [address]        Dear Colleague,    Thank you for referring your patient, Angelica Jett, to the [facility] [place]. Please see a copy of my visit note below.    Hepatology Clinic note  Angelica Jett   Date of Birth 1970         Assessment/plan:   Angelica Jett is a 53 year old female with a PMH of polymyositis related ILD on MMF, chronic hepatitis B on Entecavir, hidradenitis suppurativa, and GERD presents for follow-up.      # Chronic hepatitis B on Entecavir, suppressed:   - Screening negative for HCC. US Abdomen pending at the time of visit   - FibroScan in 2021: showing Stage 0-1 fibrosis, 6.6 kilopascals  - Continue Entecavir treatment.  - Continue HCC screening every 6 months.  - Repeat labs in 6 months. Will add Hepatitis B antigen level to next lab set assess whether spontaneous clearance was achieved to determine if continued HCC screening is necessary. Ongoing antiviral treatment will be required regardless, as patient is immunosuppressed.    # Considering starting Humira for HS  - Approved from hepatology perspective  - Recommend regular monitoring of LFTs    # Follow-up in clinic in 1 year or sooner as needed  # Labs and US in 6 months    Mikayla Mendoza, MS3  [employer]    Justina Dinh PA-C   [facility] Hepatology clinic    Total time for E/M services performed on the date of the encounter 30 minutes.  This included review of previous: clinic visits, hospital records, lab results, imaging studies, and procedural documentation. Time also includes patient visit, documentation and discussion with other providers.  The findings from this review are summarized in the above note.     -----------------------------------------------------       HPI:   Angelica Jett is a 53 year old female with a history of polymyositis related ILD on MMF, hidradenitis  "suppurativa, and GERD who presents to clinic today for follow-up regarding chronic hepatitis B. She last saw Dr. Banks/Dr. Mclean on 5/17/2023.     Diagnosis in 2005 at [facility] (Dr. Yen Knox). Liver biopsy in 2005 demonstrated severe activity with moderate fibrosis. FibroScan 2021 at [facility] showing Stage 0-1, 6.6 kilopascals.     Hepatitis B surface antigen: not available for review  Hepatitis B positive core antibody: Reactive 07/07/21  Hepatitis B surface antibody level: <10.00 07/07/21  Hepatitis B DNA Quant: Not detected 11/10/23, not detected 04/12/23  Hepatitis B/E Antigen: Negative 11/18/21  - Risk for reactivation: Moderate d/t MMF and ?future Humira    -Prior treatments:  Adefovir 2005 - ?2012  Entecavir started 2014    Currently, patient reports feeling well. She has not been hospitalized or presented to the ED since her last hepatology visit in May of this year with Dr. Banks. She is currently being managed by dermatology for hidradenitis suppurativa, who is considering starting Humira for her HS. She expressed some reluctance due to concern for hepatotoxicity. She reports her dermatologist requested input from our team on potentially starting Humira.    Patient denies jaundice, lower extremity edema, abdominal distension or confusion.      Patient also denies melena, hematochezia or hematemesis.    Patient denies weight loss, fevers, sweats or chills.    0-2 BMs per day, soft. No diarrhea.    No alcohol or tobacco use.    Currently looking for a job in finance, moved to [place] last year.  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    Hepatitis B:   Lab Results   Component Value Date    HCVAB Nonreactive 04/12/2023     Lab Results   Component Value Date    HBQRES Not Detected 11/10/2023    HBQRES Not Detected 04/12/2023     No results found for: \"HBEABY\", \"HBEAGN\"    Recent Labs   Lab Test 11/10/23  1017 10/12/23  1038 06/08/23  1216 " "05/17/23  0738 04/12/23  0950 02/01/23  1002   ALKPHOS 76  --  93 91 97 86   ALT 16 20 23 19 19 17   AST 28 25 31 25 26 37*        Medical hx Surgical hx   Past Medical History:   Diagnosis Date    Diastolic dysfunction 2/24/2014   Polymyositis  ILD  Hidradenitis suppurativa  Chronic hepatitis B  Compensated cirrhosis Past Surgical History:   Procedure Laterality Date    BIOPSY  2005    COLONOSCOPY  05/28/2021                 Physical Exam:   VS:  /70   Pulse 81   Ht 1.676 m (5' 6\")   Wt 72.1 kg (159 lb)   BMI 25.66 kg/m        Gen- well, NAD, A+Ox3, normal color  CVS- Regular rate  RS- Occasional cough, nonproductive. No use of accessory muscles.  Abd- Non-distended, non-tender  Extr- hands normal, no YANIRA  Skin- no jaundice  Neuro- no asterixis  Psych- normal mood         Data:   Reviewed in person and significant for slight elevation in Cr to 1.02. Re-affirmed recommendation to visit with nephrology, has already scheduled for May.    Liver function labs are normal. Hepatitis B DNA is undetectable.    Lab Results   Component Value Date     11/10/2023      Lab Results   Component Value Date    POTASSIUM 4.0 11/10/2023     Lab Results   Component Value Date    CHLORIDE 101 11/10/2023     Lab Results   Component Value Date    CO2 26 11/10/2023     Lab Results   Component Value Date    BUN 17.0 11/10/2023     Lab Results   Component Value Date    CR 1.02 11/10/2023       Lab Results   Component Value Date    WBC 7.8 11/10/2023     Lab Results   Component Value Date    HGB 13.5 11/10/2023     Lab Results   Component Value Date    HCT 44.0 11/10/2023     Lab Results   Component Value Date    MCV 80 11/10/2023     Lab Results   Component Value Date     11/10/2023       Lab Results   Component Value Date    AST 28 11/10/2023     Lab Results   Component Value Date    ALT 16 11/10/2023     No results found for: \"BILICONJ\"   Lab Results   Component Value Date    BILITOTAL 0.3 11/10/2023       Lab " Results   Component Value Date    ALBUMIN 4.4 11/10/2023     Lab Results   Component Value Date    PROTTOTAL 8.3 11/10/2023      Lab Results   Component Value Date    ALKPHOS 76 11/10/2023       Lab Results   Component Value Date    INR 1.05 11/10/2023         No results found.    US Abdomen pending at the time of visit           Justina Dinh PA-C

## 2023-11-17 NOTE — NURSING NOTE
"Chief Complaint   Patient presents with    Follow Up     6 month-Liver       /70   Pulse 81   Ht 1.676 m (5' 6\")   Wt 72.1 kg (159 lb)   BMI 25.66 kg/m    Nina Urbina CMA on 11/17/2023 at 1:47 PM    "

## 2023-12-07 ENCOUNTER — OFFICE VISIT (OUTPATIENT)
Dept: DERMATOLOGY | Facility: CLINIC | Age: 53
End: 2023-12-07
Payer: COMMERCIAL

## 2023-12-07 VITALS — WEIGHT: 163.7 LBS | DIASTOLIC BLOOD PRESSURE: 62 MMHG | BODY MASS INDEX: 26.42 KG/M2 | SYSTOLIC BLOOD PRESSURE: 114 MMHG

## 2023-12-07 DIAGNOSIS — L73.2 HIDRADENITIS SUPPURATIVA: Primary | ICD-10-CM

## 2023-12-07 DIAGNOSIS — D84.9 IMMUNOSUPPRESSED STATUS (H): ICD-10-CM

## 2023-12-07 PROCEDURE — 99214 OFFICE O/P EST MOD 30 MIN: CPT | Mod: GC | Performed by: DERMATOLOGY

## 2023-12-07 NOTE — PATIENT INSTRUCTIONS
HIDRADENITIS SUPPURATIVA     What is hidradenitis suppurativa (HS)?  Hidradenitis suppurativa (HS) is a chronic skin disorder affecting the hair follicles. The disease starts with sore red lumps (or boils), typically involving the armpits, breasts, lower abdomen, groin, and buttocks. These lesions appear suddenly, increase in size and then burst or rupture, usually under the surface of the skin. This causes severe pain. Sometimes they rupture to the surface of the skin, draining pus. In some people, they can result in tunnels under the skin that may or may not continue to drain. When the lumps heal, they can leave scars.     Facts:   HS is a chronic skin disease, that comes and goes in flares, and is very painful  HS happens in many people - men and women, young and elderly, all races and ethnicities. But HS is more common in young adults, women  and skin of color  One out of three people with HS has a family member with HS   HS is NOT due to an infection or washing habits  HS is NOT contagious, so it cannot be spread from one person to another person   HS is NOT caused by how well you wash or what soaps you use  HS is NOT caused by smoking or obesity, but quitting smoking and losing weight have helped some people        Causes  The cause of HS remains unclear. It is thought that there is a problem in the hair follicle that makes it weaker and easier to break open. The current theory is that there are three steps that cause an HS lesion to form::   The hair follicle gets plugged   The hair follicle swells and then ruptures, causing pain and inflammation   In some people, the inflammation does not stop and results in tunneling through the skin       Associated Conditions  HS is associated with several other medical conditions and activities including:  Tobacco use  High cholesterol, heart disease and stroke   Type 2 diabetes   Depression and anxiety   Arthritis, which causes stiffness and pain in joints    Inflammatory bowel disease (including Crohn's disease and ulcerative colitis)  Severe acne and pilonidal sinus/cyst  Polycystic ovarian syndrome  Skin cancer in areas of longstanding HS lesions, typically in the groin or buttocks (rare)    It is important to ask your physician to watch out for these conditions.      To help manage mental health issues related to HS and emotional support:  Help finding a mental health specialist: https://www.psychologypfwaterworks.com/us/therapists  Suicide prevention (24/7 free confidential support):   Phone: (283) 342-8302  Website: https://suicidepreventionlifeline.org/    Support groups:    Hope for HS: www.hopeforhs.org  HS Connect: www.hsconnect.org    HS Patient Support Application for your phone: Papaya (developed in collaboration with patient and community HS advocacy groups)    Intimacy support: American Association of Sexuality Educators, Counselors and Therapists (AASECT) website: https://www.aasect.org      For help quitting smoking   Phone:  English: 9-602-UWDL-NOW (1-168.752.3219)  Micronesian: 5-171-MIKNON-YA (1-732.521.9792)    Website:   English: https://smokefree.gov/  Micronesian: espanol.smokefree.gov     Lifestyle Modifications   Quitting smoking or weight loss can help your overall health and may help improve HS symptoms in some people, but not everyone.   It is recommended to eat a well-balanced, healthy diet (https://health.gov/dietaryguidelines) and to maintain a healthy weight. Avoiding dietary triggers can help some people suffering with HS, but will not necessarily help others with HS.    Some people may find that friction, irritation, and rubbing may worsen HS symptoms. Some people do better with loose, breathable clothing and fabrics. Shaving close to the affected areas may also worsen HS in some people. But, not everyone has the same triggers for their HS, so see what works for you!    Some medications may worsen HS such as lithium, testosterone, and some  progesterone-only birth control.  Please discuss this with your provider before stopping medication.     Zinc supplementation has been shown in some studies to help HS. However, every treatment can have a side effects,  so talk to your provider before starting any treatment.     Treatment                    Medicines, procedures and surgeries are offered to treat HS. Treatment can help reduce breakouts and improve quality of life.  Medicines used:  Topical washes (e.g. chlorhexidine, benzoyl peroxide)    Clindamycin on the skin - seems effective for pustules and papules. Probably not helpful for larger chronic lesions.     Oral antibiotics (e.g. doxycycline, clindamycin + rifampin, dapsone) - antibiotics may help some, but not all patients    Hormonal therapies (e.g. spironolactone, oral contraceptives) - primarily used in females though to have a hormonal component to their HS (e.g perimenstrual flares, worsening in pregnancy, polycystic ovarian syndrome)    Immunosuppression (e.g. prednisone)     Injectables (e.g. adalimumab, secukinumab, infliximab) - Adalimumab and Secukinumab are the only federal drug administration (FDA) approved medication for HS  Education for adalimumab injections: https://www.[employer]/Gazemetrix-complete/injection  Adalimumab abassador program:   Website: https://www.[employer]/Gaz[employer]-complete/sign-up/  Phone: 6.386.7GFPIBH (1.936.561.8690)    Procedures:  Intralesional steroid injections - may help areas of acute active inflammation     Some hair removal lasers - If considering this option, we recommend doing this only with a medical professional that has experience treating HS.     Surgeries:  Incision and drainage - Provides fast, short-term relief, but symptoms likely to recur.        Deroofing - This surgery involves opening the lesion and cleaning out the base. This treatment is effective for recurring lesions.  Recurrence rates seem to be similar to excision. These are typically left  open and allowed to heal on their own over 1-3 months      Excision - This involves cutting out chronic lesions.  This may entail cutting out a large affected area referred to as wide local excision, or cutting out single lesions, referred to as localized excisions.  Excision may be done with a scalpel, electric heating device or laser (e.g. carbon dioxide [CO2] laser).  These are either allowed to heal on their own, closed with sutures, or closed with a graft or flap.  Grafts are typically done by taking skin from one site of the body and using it to close another part of the body.  Flaps are done by moving tissue around to close a wound.     Check out this website to help decide the best treatment options for you!     https://www.informed-decisions.org/hidradenitispda.php

## 2023-12-07 NOTE — PROGRESS NOTES
[facility] Dermatology Note  Encounter Date: Dec 7, 2023  Office Visit     Dermatology Problem List:  1. Hidradenitis suppurativa with ulcerating/PG-like phenotype in left axilla  - Current Tx: Bactrim (started 5/31/23), mycophenolate, ILK-40 injection right axilla nodule 9/7/23  - Prior Tx: tetracycline (started 4/12/23), prednisone  - Future Considerations: rheumatology and pulmonology supportive of starting Humira, ERIK inhibitors    MHx: Interstitial lung disease. Chronic hepatitis B (taking Entecavir). Polymyositis 2005 (taking mycophenolate).     __________________________________     Assessment & Plan:   #  Hidradenitis suppurativa +/- PG vs HS with PG-phenotype. Stable, though nodule on right axilla, which is slightly open with less inflammation, and tunnel on left axilla has healed pretty dramatically from prior images  - Discontinue clobetasol as long as tunnel does not open on the left axilla,  - Continue Bactrim 1 tablet BID. Discussed risks and benefits of stopping medication, and ultimately decided to continue on medication. Though her creatinine has slowly increased over the past few months, it is stable compared with prior values in 2020 and 2021.  - Continue mycophenolate 1500 mg daily, 2 500 mg tablets QAM and 1 500 mg tablet QPM.    Follow-up: 6 months in-person, or earlier for new or changing lesions    Staff and Resident:     Flaca Dia MD (PGY-3)  Dermatology Resident       Scribe Disclosure:   I, Marielos Ramirez, am serving as a scribe to document services personally performed by Sanjiv Sotomayor MD based on data collection and the provider's statements to me.       Provider Disclosure:   The documentation recorded by the scribe accurately reflects the services I personally performed and the decisions made by me.    Sanjiv Sotomayor MD, FAAD    Departments of Internal Medicine and Dermatology  [facility]  [phone]  Staff Physician  Comments:   I saw and evaluated the patient with the resident and I agree with the assessment and plan.  I was present for the examination.    Sanjiv Sotomayor MD, FAAD    Departments of Internal Medicine and Dermatology  [facility]  [phone]     ____________________________________________    CC: Derm Problem (Angelica is here for HS follow up. No changes since last visit.)    HPI:  Ms. Angelica Jett is a(n) 53 year old female who presents today as a return patient for HS.     Last seen by me on 9/7/23 where daily topical clobetasol was started for the left axilla. Bactrim BID and mycophenolate 1500 mg daily was continued. ILK-40 was injected into the right axilla.    Today, she reports her symptoms are better than when she initially started following with this clinic. Patient feels her flares, especially in her axillae, have drastically improved. Denies any new spots. Because she is doing well and due to kidney function, specifically creatinine level spike, she would like to discuss possibly stopping Bactrim. Is compliant with mycophenolate, 1000 mg QAM and 500 mg QPM, and topical clobetasol.     Patient is otherwise feeling well, without additional skin concerns.    HS Nurse Assessment        6/22/2023     3:20 PM 9/7/2023     2:14 PM 12/7/2023     1:36 PM   Nurse Assessment Data   Over the past 30 days how many old lesions flared back up? 2 1 0   Over the past 30 days how many new lesions did you get? 0 0 0   Over the past week, how many dressing changes do you do each day? 2 1 0   Over the past week, has your wound drainage been: Moderate Mild Mild   Rate your HS overall from 0 - 10 (0 = no disease, 10 = worst) over the past week:  5 0 0   Rate your pain score from 0 - 10 (0 = no disease, 10 = worst) for the most painful/symptomatic lesion in the past week:  5 - Moderate Pain 0 - No Pain 0 - No Pain   Over the past week, how much has HS influenced your quality of life?  moderately not at all not at all     Labs Reviewed:  Creatinine 6936-6540  eGFR 8942-0549     Physical Exam:  Vitals: /62 (BP Location: Right arm)   Wt 74.3 kg (163 lb 11.2 oz)   BMI 26.42 kg/m    SKIN: Focused examination of axillae was performed.   - No other lesions of concern on areas examined.   - Tunnel in R axilla with small open draining nodule  - Tunnel in L axilla with decreased erythema compared to prior exam    HS Data      6/8/2023    11:00 AM 12/7/2023     2:12 PM   HS Exam Data   LC Type LC1 LC1   Clinical Subtypes Regular type Regular type   Acne? No No   Dissecting Cellulitis? No No   Visual analogue score (0-100) 40    Total Lindsay Stage I    Total Inflammatory Nodules 1 1   Total Abcesses 0 0   Total Draining Tunnels 1 1   Total Abscess and Nodule Count 1 1   IHS4 Score  5 5   Total  HASI Score 27 18   HS-PGA 1      Medications:  Current Outpatient Medications   Medication    Calcium Citrate-Vitamin D3 315-6.25 MG-MCG TABS    clobetasol (TEMOVATE) 0.05 % external ointment    entecavir (BARACLUDE) 0.5 MG tablet    fluticasone-vilanterol (BREO ELLIPTA) 100-25 MCG/ACT inhaler    ibuprofen (ADVIL/MOTRIN) 600 MG tablet    mycophenolate (GENERIC EQUIVALENT) 500 MG tablet    sulfamethoxazole-trimethoprim (BACTRIM DS) 800-160 MG tablet    Vitamin D3 (VITAMIN D, CHOLECALCIFEROL,) 25 mcg (1000 units) tablet     No current facility-administered medications for this visit.      Past Medical History:   Patient Active Problem List   Diagnosis    Pulmonary hypertension (H)    Hepatitis B virus infection    Interstitial lung disease (H)    Abscess    Hidradenitis axillaris    Benign tubular adenoma of large intestine    Diastolic dysfunction    GERD (gastroesophageal reflux disease)    OP (osteoporosis)    Other irritable bowel syndrome    Polymyositis (H)     Past Medical History:   Diagnosis Date    Diastolic dysfunction 2/24/2014

## 2023-12-07 NOTE — LETTER
12/7/2023       RE: Angelica Jett  [address]  [place] [address]     Dear Colleague,    Thank you for referring your patient, Angelica Jett, to the [facility] [place] at [facility]. Please see a copy of my visit note below.    [facility] Dermatology Note  Encounter Date: Dec 7, 2023  Office Visit     Dermatology Problem List:  1. Hidradenitis suppurativa with ulcerating/PG-like phenotype in left axilla  - Current Tx: Bactrim (started 5/31/23), mycophenolate, ILK-40 injection right axilla nodule 9/7/23  - Prior Tx: tetracycline (started 4/12/23), prednisone  - Future Considerations: rheumatology and pulmonology supportive of starting Humira, ERIK inhibitors    MHx: Interstitial lung disease. Chronic hepatitis B (taking Entecavir). Polymyositis 2005 (taking mycophenolate).     __________________________________     Assessment & Plan:   #  Hidradenitis suppurativa +/- PG vs HS with PG-phenotype. Stable, though nodule on right axilla, which is slightly open with less inflammation, and tunnel on left axilla has healed pretty dramatically from prior images  - Discontinue clobetasol as long as tunnel does not open on the left axilla,  - Continue Bactrim 1 tablet BID. Discussed risks and benefits of stopping medication, and ultimately decided to continue on medication. Though her creatinine has slowly increased over the past few months, it is stable compared with prior values in 2020 and 2021.  - Continue mycophenolate 1500 mg daily, 2 500 mg tablets QAM and 1 500 mg tablet QPM.      Follow-up: 6 months in-person, or earlier for new or changing lesions    Staff and Resident:     Flaca Dia MD (PGY-3)  Dermatology Resident       Scribe Disclosure:   Marielos DASILVA, am serving as a scribe to document services personally performed by Sanjiv Sotomayor MD based on data collection and the provider's statements to  me.       Provider Disclosure:   The documentation recorded by the scribe accurately reflects the services I personally performed and the decisions made by me.    Sanjiv Sotomayor MD, FAAD    Departments of Internal Medicine and Dermatology  [facility]  [phone]    ____________________________________________    CC: Derm Problem (Angelica is here for HS follow up. No changes since last visit.)    HPI:  Ms. Angelica Jett is a(n) 53 year old female who presents today as a return patient for HS.     Last seen by me on 9/7/23 where daily topical clobetasol was started for the left axilla. Bactrim BID and mycophenolate 1500 mg daily was continued. ILK-40 was injected into the right axilla.    Today, she reports her symptoms are better than when she initially started following with this clinic. Patient feels her flares, especially in her axillae, have drastically improved. Denies any new spots. Because she is doing well and due to kidney function, specifically creatinine level spike, she would like to discuss possibly stopping Bactrim. Is compliant with mycophenolate, 1000 mg QAM and 500 mg QPM, and topical clobetasol.     Patient is otherwise feeling well, without additional skin concerns.    HS Nurse Assessment        6/22/2023     3:20 PM 9/7/2023     2:14 PM 12/7/2023     1:36 PM   Nurse Assessment Data   Over the past 30 days how many old lesions flared back up? 2 1 0   Over the past 30 days how many new lesions did you get? 0 0 0   Over the past week, how many dressing changes do you do each day? 2 1 0   Over the past week, has your wound drainage been: Moderate Mild Mild   Rate your HS overall from 0 - 10 (0 = no disease, 10 = worst) over the past week:  5 0 0   Rate your pain score from 0 - 10 (0 = no disease, 10 = worst) for the most painful/symptomatic lesion in the past week:  5 - Moderate Pain 0 - No Pain 0 - No Pain   Over the past week, how much has HS influenced your  quality of life? moderately not at all not at all     Labs Reviewed:  Creatinine 4664-3747  eGFR 0126-5729     Physical Exam:  Vitals: /62 (BP Location: Right arm)   Wt 74.3 kg (163 lb 11.2 oz)   BMI 26.42 kg/m    SKIN: Focused examination of axillae was performed.   - No other lesions of concern on areas examined.   - Tunnel in R axilla with small open draining nodule  - Tunnel in L axilla with decreased erythema compared to prior exam    HS Data      6/8/2023    11:00 AM 12/7/2023     2:12 PM   HS Exam Data   LC Type LC1 LC1   Clinical Subtypes Regular type Regular type   Acne? No No   Dissecting Cellulitis? No No   Visual analogue score (0-100) 40    Total Lindsay Stage I    Total Inflammatory Nodules 1 1   Total Abcesses 0 0   Total Draining Tunnels 1 1   Total Abscess and Nodule Count 1 1   IHS4 Score  5 5   Total  HASI Score 27 18   HS-PGA 1      Medications:  Current Outpatient Medications   Medication    Calcium Citrate-Vitamin D3 315-6.25 MG-MCG TABS    clobetasol (TEMOVATE) 0.05 % external ointment    entecavir (BARACLUDE) 0.5 MG tablet    fluticasone-vilanterol (BREO ELLIPTA) 100-25 MCG/ACT inhaler    ibuprofen (ADVIL/MOTRIN) 600 MG tablet    mycophenolate (GENERIC EQUIVALENT) 500 MG tablet    sulfamethoxazole-trimethoprim (BACTRIM DS) 800-160 MG tablet    Vitamin D3 (VITAMIN D, CHOLECALCIFEROL,) 25 mcg (1000 units) tablet     No current facility-administered medications for this visit.      Past Medical History:   Patient Active Problem List   Diagnosis    Pulmonary hypertension (H)    Hepatitis B virus infection    Interstitial lung disease (H)    Abscess    Hidradenitis axillaris    Benign tubular adenoma of large intestine    Diastolic dysfunction    GERD (gastroesophageal reflux disease)    OP (osteoporosis)    Other irritable bowel syndrome    Polymyositis (H)     Past Medical History:   Diagnosis Date    Diastolic dysfunction 2/24/2014

## 2024-01-16 ENCOUNTER — TELEPHONE (OUTPATIENT)
Dept: PULMONOLOGY | Facility: CLINIC | Age: 54
End: 2024-01-16
Payer: COMMERCIAL

## 2024-01-16 NOTE — TELEPHONE ENCOUNTER
Patient Contacted for the patient to call back and schedule the following:    Appointment type: [facility]  Provider: JESUSITA  Return date: 04/18/24  Specialty phone number: [phone]  Additional appointment(s) needed: FPFT  Additonal Notes: N/A

## 2024-02-07 ENCOUNTER — TELEPHONE (OUTPATIENT)
Dept: NEPHROLOGY | Facility: CLINIC | Age: 54
End: 2024-02-07
Payer: COMMERCIAL

## 2024-02-07 DIAGNOSIS — N18.2 CKD (CHRONIC KIDNEY DISEASE) STAGE 2, GFR 60-89 ML/MIN: Primary | ICD-10-CM

## 2024-02-07 NOTE — TELEPHONE ENCOUNTER
Spoke with patient to assist in rescheduling her appointment with Dr Trujillo. Dr Trujillo is out of clinic 3/26. Patient rescheduled with Dr Harvinder Dotson for 5/10 at 10:00.  ZOILA Lopez Care Coordinator  Nephrology

## 2024-02-13 DIAGNOSIS — J84.9 ILD (INTERSTITIAL LUNG DISEASE) (H): ICD-10-CM

## 2024-02-13 DIAGNOSIS — M60.10: ICD-10-CM

## 2024-02-20 RX ORDER — MYCOPHENOLATE MOFETIL 500 MG/1
TABLET ORAL
Qty: 270 TABLET | Refills: 0 | OUTPATIENT
Start: 2024-02-20

## 2024-02-28 ENCOUNTER — TELEPHONE (OUTPATIENT)
Dept: PULMONOLOGY | Facility: CLINIC | Age: 54
End: 2024-02-28
Payer: COMMERCIAL

## 2024-02-28 NOTE — TELEPHONE ENCOUNTER
Left Voicemail (1st Attempt) for the patient to call back and schedule the following:    Appointment type: [facility]  Provider: JESUSITA  Return date: 05/2024  Specialty phone number: [phone]  Additional appointment(s) needed: FPFT  Additonal Notes: N/A

## 2024-03-04 ENCOUNTER — TELEPHONE (OUTPATIENT)
Dept: PULMONOLOGY | Facility: CLINIC | Age: 54
End: 2024-03-04
Payer: COMMERCIAL

## 2024-03-04 NOTE — TELEPHONE ENCOUNTER
Patient Contacted for the patient to call back and schedule the following:    Called patient to reschedule 4/18 appt with Dr. Bustillo, patient already has 5/10 appt scheduled, upset about appointments needing to be rescheduled more than once. Message sent to Rafa to see how to proceed

## 2024-03-10 ENCOUNTER — HEALTH MAINTENANCE LETTER (OUTPATIENT)
Age: 54
End: 2024-03-10

## 2024-03-11 NOTE — CONFIDENTIAL NOTE
DIAGNOSIS:  CKD (chronic kidney disease) stage 2, GFR 60-89 ml/min    DATE RECEIVED: 05.10.2024    NOTES STATUS DETAILS   OFFICE NOTE from referring provider Internal 11.1.2023 Garrison Bustillo MD    OFFICE NOTE from other specialist      *Only VASCULITIS or LUPUS gather office notes for the following     *PULMONARY       *ENT     *DERMATOLOGY     *RHEUMATOLOGY     DISCHARGE SUMMARY from hospital     DISCHARGE REPORT from the ER     MEDICATION LIST Internal    IMAGING  (NEED IMAGES AND REPORTS)     KIDNEY CT SCAN     KIDNEY ULTRASOUND     MR ABDOMEN     NUCLEAR MEDICINE RENAL     LABS     CBC Internal 11.10.2023   CMP Internal 11.10.2023   BMP Internal 08.18.2023   UA     URINE PROTEIN     RENAL PANEL     BIOPSY     KIDNEY BIOPSY

## 2024-03-26 DIAGNOSIS — L73.2 HIDRADENITIS SUPPURATIVA: ICD-10-CM

## 2024-03-29 NOTE — TELEPHONE ENCOUNTER
SULFAMETHOXAZOLE-TMP DS TABLET       Take 1 tablet by mouth 2 times daily - Oral  Last Written Prescription Date:  9-7-23  Last Fill Quantity: 90,   # refills: 3  Last Office Visit : 12-7-23  Future Office visit:  6-13-24    Routing refill request to provider for review/approval because:  Med not on derm protocol

## 2024-04-04 RX ORDER — SULFAMETHOXAZOLE/TRIMETHOPRIM 800-160 MG
1 TABLET ORAL 2 TIMES DAILY
Qty: 180 TABLET | Refills: 3 | Status: SHIPPED | OUTPATIENT
Start: 2024-04-04

## 2024-04-11 ENCOUNTER — HOSPITAL ENCOUNTER (OUTPATIENT)
Dept: MAMMOGRAPHY | Facility: CLINIC | Age: 54
Discharge: HOME | End: 2024-04-11
Attending: FAMILY MEDICINE | Admitting: FAMILY MEDICINE
Payer: COMMERCIAL

## 2024-04-11 DIAGNOSIS — Z12.31 VISIT FOR SCREENING MAMMOGRAM: ICD-10-CM

## 2024-04-11 PROCEDURE — 77063 BREAST TOMOSYNTHESIS BI: CPT

## 2024-04-19 ENCOUNTER — LAB (OUTPATIENT)
Dept: LAB | Facility: CLINIC | Age: 54
End: 2024-04-19
Payer: COMMERCIAL

## 2024-04-19 ENCOUNTER — OFFICE VISIT (OUTPATIENT)
Dept: PULMONOLOGY | Facility: CLINIC | Age: 54
End: 2024-04-19
Payer: COMMERCIAL

## 2024-04-19 ENCOUNTER — OFFICE VISIT (OUTPATIENT)
Dept: PULMONOLOGY | Facility: CLINIC | Age: 54
End: 2024-04-19
Attending: INTERNAL MEDICINE
Payer: COMMERCIAL

## 2024-04-19 ENCOUNTER — TELEPHONE (OUTPATIENT)
Dept: TRANSPLANT | Facility: CLINIC | Age: 54
End: 2024-04-19

## 2024-04-19 VITALS
OXYGEN SATURATION: 96 % | SYSTOLIC BLOOD PRESSURE: 108 MMHG | HEART RATE: 69 BPM | DIASTOLIC BLOOD PRESSURE: 70 MMHG | BODY MASS INDEX: 25.88 KG/M2 | WEIGHT: 161 LBS | HEIGHT: 66 IN

## 2024-04-19 DIAGNOSIS — J84.9 ILD (INTERSTITIAL LUNG DISEASE) (H): ICD-10-CM

## 2024-04-19 DIAGNOSIS — J84.9 ILD (INTERSTITIAL LUNG DISEASE) (H): Primary | ICD-10-CM

## 2024-04-19 DIAGNOSIS — B18.1 CHRONIC VIRAL HEPATITIS B WITHOUT DELTA AGENT AND WITHOUT COMA (H): ICD-10-CM

## 2024-04-19 DIAGNOSIS — I27.29 OTHER SECONDARY PULMONARY HYPERTENSION (H): ICD-10-CM

## 2024-04-19 DIAGNOSIS — Z51.81 MEDICATION MONITORING ENCOUNTER: ICD-10-CM

## 2024-04-19 LAB
ALBUMIN SERPL BCG-MCNC: 4.2 G/DL (ref 3.5–5.2)
ALP SERPL-CCNC: 79 U/L (ref 40–150)
ALT SERPL W P-5'-P-CCNC: 16 U/L (ref 0–50)
ANION GAP SERPL CALCULATED.3IONS-SCNC: 12 MMOL/L (ref 7–15)
AST SERPL W P-5'-P-CCNC: 25 U/L (ref 0–45)
BASOPHILS # BLD AUTO: 0 10E3/UL (ref 0–0.2)
BASOPHILS NFR BLD AUTO: 1 %
BILIRUB DIRECT SERPL-MCNC: <0.2 MG/DL (ref 0–0.3)
BILIRUB SERPL-MCNC: 0.2 MG/DL
BUN SERPL-MCNC: 17 MG/DL (ref 6–20)
CALCIUM SERPL-MCNC: 9.2 MG/DL (ref 8.6–10)
CHLORIDE SERPL-SCNC: 104 MMOL/L (ref 98–107)
CREAT SERPL-MCNC: 0.98 MG/DL (ref 0.51–0.95)
DEPRECATED HCO3 PLAS-SCNC: 23 MMOL/L (ref 22–29)
EGFRCR SERPLBLD CKD-EPI 2021: 68 ML/MIN/1.73M2
EOSINOPHIL # BLD AUTO: 0.3 10E3/UL (ref 0–0.7)
EOSINOPHIL NFR BLD AUTO: 4 %
ERYTHROCYTE [DISTWIDTH] IN BLOOD BY AUTOMATED COUNT: 13.8 % (ref 10–15)
GLUCOSE SERPL-MCNC: 88 MG/DL (ref 70–99)
HCT VFR BLD AUTO: 44.7 % (ref 35–47)
HGB BLD-MCNC: 13.5 G/DL (ref 11.7–15.7)
IMM GRANULOCYTES # BLD: 0 10E3/UL
IMM GRANULOCYTES NFR BLD: 0 %
LYMPHOCYTES # BLD AUTO: 2.7 10E3/UL (ref 0.8–5.3)
LYMPHOCYTES NFR BLD AUTO: 39 %
MCH RBC QN AUTO: 23.9 PG (ref 26.5–33)
MCHC RBC AUTO-ENTMCNC: 30.2 G/DL (ref 31.5–36.5)
MCV RBC AUTO: 79 FL (ref 78–100)
MONOCYTES # BLD AUTO: 0.6 10E3/UL (ref 0–1.3)
MONOCYTES NFR BLD AUTO: 8 %
NEUTROPHILS # BLD AUTO: 3.4 10E3/UL (ref 1.6–8.3)
NEUTROPHILS NFR BLD AUTO: 48 %
NRBC # BLD AUTO: 0 10E3/UL
NRBC BLD AUTO-RTO: 0 /100
PLATELET # BLD AUTO: 207 10E3/UL (ref 150–450)
POTASSIUM SERPL-SCNC: 4.4 MMOL/L (ref 3.4–5.3)
PROT SERPL-MCNC: 8.1 G/DL (ref 6.4–8.3)
RBC # BLD AUTO: 5.65 10E6/UL (ref 3.8–5.2)
SODIUM SERPL-SCNC: 139 MMOL/L (ref 135–145)
WBC # BLD AUTO: 7 10E3/UL (ref 4–11)

## 2024-04-19 PROCEDURE — 82248 BILIRUBIN DIRECT: CPT | Performed by: PATHOLOGY

## 2024-04-19 PROCEDURE — 82105 ALPHA-FETOPROTEIN SERUM: CPT | Performed by: PHYSICIAN ASSISTANT

## 2024-04-19 PROCEDURE — 99213 OFFICE O/P EST LOW 20 MIN: CPT | Performed by: INTERNAL MEDICINE

## 2024-04-19 PROCEDURE — 99000 SPECIMEN HANDLING OFFICE-LAB: CPT | Performed by: PATHOLOGY

## 2024-04-19 PROCEDURE — 85025 COMPLETE CBC W/AUTO DIFF WBC: CPT | Performed by: PATHOLOGY

## 2024-04-19 PROCEDURE — 80053 COMPREHEN METABOLIC PANEL: CPT | Performed by: PATHOLOGY

## 2024-04-19 PROCEDURE — 36415 COLL VENOUS BLD VENIPUNCTURE: CPT | Performed by: PATHOLOGY

## 2024-04-19 PROCEDURE — 82610 CYSTATIN C: CPT | Performed by: PHYSICIAN ASSISTANT

## 2024-04-19 PROCEDURE — 87517 HEPATITIS B DNA QUANT: CPT | Performed by: PHYSICIAN ASSISTANT

## 2024-04-19 PROCEDURE — 99215 OFFICE O/P EST HI 40 MIN: CPT | Performed by: INTERNAL MEDICINE

## 2024-04-19 RX ORDER — MYCOPHENOLATE MOFETIL 500 MG/1
1000 TABLET ORAL 2 TIMES DAILY
Qty: 120 TABLET | Refills: 5 | Status: SHIPPED | OUTPATIENT
Start: 2024-04-19

## 2024-04-19 ASSESSMENT — PAIN SCALES - GENERAL: PAINLEVEL: NO PAIN (0)

## 2024-04-19 NOTE — LETTER
4/19/2024         RE: Angelica Jett  [address]  [place] [address]        Dear Colleague,    Thank you for referring your patient, Angelica Jett, to the [facility] FOR LUNG SCIENCE AND [facility] [place]. Please see a copy of my visit note below.    [facility]    Reason for Visit  Angelica Jett is a 52 year old year old female who is being seen for known polymyositis related ILD.   HPI  Patient is 52 years old female originally from [place] was in usual state of health until she delivered a healthy child back in 2005 and developed interstitial lung disease and associated muscle pain.  At the time, she reportedly had a lung biopsy in [place] which in association with polymyositis clinical diagnosis [reportedly had thigh MRI], received a diagnosis of ILD associated with polymyositis.  Her main immunosuppressive regimen has been mycophenolate mofetil, which has been decreased back in May 2022 to 1500 mg/day which appears to be due to PFT stability and symptom improvement.  Earlier in the disease, lung transplant was considered as she was needing oxygen, but as she responded to immunosuppression lung transplant has been deferred.  She has also been receiving pulmonary rehab with significant improvement in symptoms.  She admits to some interruption in her mycophenolate and with that her symptoms worsened back in 2022.  She also has known hepatitis B receiving Entecavir with normal liver function as of late 2022.  She works as a  and has no occupational exposures.  She is a lifetime non-smoker and denies secondhand smoke exposure.  She denies triggers for hypersensitivity pneumonitis.  She also had pulmonary hypertension (2013) and is needed Adcirca and revatio, but they were discontinued based on improvement in her pulmonary pressures on right heart cath 2019.    Updates from 8/18/23  Patient returns for follow up. Has  "seen rheumatology and dermatology, her most active problem now is Hidradenitis suppurativa with ulceration, received bactrim with no improvement, has been seeing wound clinic and her wound is starting to heal. She was offered humira and we supported this decision but she remains very reluctant to start it, concerned about pulmonary side effects. Her level of activity is unchanged over the last 6 months. She completed pulmonary rheb and feels benefit. She uses O2 at 2 LPM with exercise. She is currently seeking a job (finance).     Update from 11/10/23:  Angelica returns today for follow up. She completed pulmonary rehab in July 2023 with marked improvement in exercise capacity. She denies current symptoms of infection or ILD exacerbation. Continues to tolerate MMF dosing at 1500 mg/day, with mild renal impairment that is unchanged (eGFR 65) as of last month. She is also receiving entecavir for hepatitis B. No recent hospitalizations.  She has started Humira around 1-2 months ago.     Updates from today 4/18/24:  Angelica returns for follow up today. She feels better overall with improved energy and exercise tolerance. She is looking for a job (). She continues to tolerate MMF 1000 mg qAM and 500 mg at bedtime with no notable side effects. She is recovering from common cold symptoms and hears herself wheezing which is unusual for her. No recent fever.     Vitals: /70   Pulse 69   Ht 1.676 m (5' 6\")   Wt 73 kg (161 lb)   SpO2 96%   BMI 25.99 kg/m        Exam:   GENERAL APPEARANCE: Well developed, well nourished, alert, and in no apparent distress.  RESP: Reduced air flow bilaterally.  Course inspiratory rales more pronounced in the bases and expiratory wheezes.  CV: Normal S1, S2, regular rhythm, normal rate. No murmur.  No LE edema.   MS: extremities normal. No clubbing. No cyanosis.  SKIN: no rash on limited exam.  NEURO: Mentation intact, speech normal, normal gait and stance.  PSYCH: mentation " appears normal. and affect normal/bright.    Results:  Chest imaging:  Reviewed high-resolution CT chest images from today 2/1/2023 with the patient and compared to May 2021  Unchanged typical UIP pattern with extensive honeycombing in both lower lobes, and subpleural distribution tracking up the upper lobes.  The CT chest from May 2021 did show mild diffuse groundglass opacities, favoring pulmonary edema at the time.      PFTs today 4/18/24        My interpretation: Stable Moderate to severe intrathoracic restrictive disease    PFTs from outside May 2022  FVC 1.91  FEV1 1.61  FEV1/FVC 84  TLC 2.98  DLCO 7.74    PFTs from outside May 2021  FVC 1.86  FEV1 1.51  FEV1/FVC 81  TLC 3.22  DLCO 5.58    PFTs from outside May 2019  FEV1 1.51-59%, FVC 1.92-61%  TLC 2.15-41%  DLCO 31%    Six mins walk on 2 LPM, no desaturation below 89%:      Echo from outside showed mild RV dysfunction and pulmonary artery systolic pressure 20-25  She has history of pulmonary arterial hypertension that followed pregnancy 2005, however a repeat right heart cath in 2019 showed no residual pulm hypertension    Right heart cath May 2018  RA 3  RV 35/8  PA 36/15 [23] from a previous mean high of 47 in 2011, but PCWP was 27 at the time  PCWP 11  PVR 2.2    Previous pulmonary hypertension therapies  Adcirca and revatio to 2013    Assessment and plan:   ILD in polymyositis on MMF 1500 mg/d  Patient is 53 years old female with known history of polymyositis since 2005 following pregnancy, and lung biopsy at the time with established UIP pattern.  The patient has moved in from [place] in 2022.  Back in May 2022, her local pulmonologist at the time reduced her mycophenolate to 1000 mg in the morning and 500 in the evening, which appears to be due to PFT stability and symptomatic improvement. She does not recall having side effect from mycophenolate, but has mild renal impairment that we are monitoring, thought to be secondary to bactrim that she takes  for HS by dermatology. Cystatin c last visit reflected normal eGFR. DLCO seems to have stabilized. She used to need oxygen at 4 L/min with activity, however she did not need O2 on six mins walk roger 2022, and 8/2023 requiring 2 LPM towards the end of the six mins walk. For UIP, we checked ANCA and anti-CCP and both were negative. In light of worsening TLC and DLCO, we re-discussed Ofev today and now agreeable to try it. I previously reached out to hepatology after the visit and they are ok with ofev with usual monitoring protocol. We also briefly discussed lung transplant referral and she was evaluated for it previously in [place] then she improved. I explained that her PFTs are certainly progressing so it will be helpful to have a lung transplant visit.     Plan:  - Increase MMF to 1000 mg po BID   - CBC and CMP today then q3 months at local lab  - Cystatin c to reconfirm normal eGFR while on bactrim   - Lung transplant referral   - Ofev 100 mg po BID for one month then 150 mg po BID   Hepatology provided their input that we can use Ofev with usual lab monitoring per protocol.   - Pulmonary rehab referral   - PFTs in six months   - Follow up with rheumatology Dr Ferrer     2. Hepatitis B on entecavir  Continue follow up with hepatology     3. Previous secondary PAH, revatio and adcirca discontinued in 2019 based on improvements in RHC    4. Immunization  Up to date     5. INGRID vs CKD, mild  Saw nephrology, Cr elevation attributed to bactrim     6. Chronic hypoxic respiratory failure  - Continue Home O2 at 2 LPM with activity   - 2D Echo r/o PH     Return in 6 months   Garrison Bustillo MD  Total time spent today was 40 minutes

## 2024-04-19 NOTE — PROGRESS NOTES
[facility]    Reason for Visit  Angelica Jett is a 52 year old year old female who is being seen for known polymyositis related ILD.   HPI  Patient is 52 years old female originally [place] was in usual state of health until she delivered a healthy child back in 2005 and developed interstitial lung disease and associated muscle pain.  At the time, she reportedly had a lung biopsy in [place] which in association with polymyositis clinical diagnosis [reportedly had thigh MRI], received a diagnosis of ILD associated with polymyositis.  Her main immunosuppressive regimen has been mycophenolate mofetil, which has been decreased back in May 2022 to 1500 mg/day which appears to be due to PFT stability and symptom improvement.  Earlier in the disease, lung transplant was considered as she was needing oxygen, but as she responded to immunosuppression lung transplant has been deferred.  She has also been receiving pulmonary rehab with significant improvement in symptoms.  She admits to some interruption in her mycophenolate and with that her symptoms worsened back in 2022.  She also has known hepatitis B receiving Entecavir with normal liver function as of late 2022.  She works as a  and has no occupational exposures.  She is a lifetime non-smoker and denies secondhand smoke exposure.  She denies triggers for hypersensitivity pneumonitis.  She also had pulmonary hypertension (2013) and is needed Adcirca and revatio, but they were discontinued based on improvement in her pulmonary pressures on right heart cath 2019.    Updates from 8/18/23  Patient returns for follow up. Has seen rheumatology and dermatology, her most active problem now is Hidradenitis suppurativa with ulceration, received bactrim with no improvement, has been seeing wound clinic and her wound is starting to heal. She was offered humira and we supported this decision but she remains very  "reluctant to start it, concerned about pulmonary side effects. Her level of activity is unchanged over the last 6 months. She completed pulmonary rheb and feels benefit. She uses O2 at 2 LPM with exercise. She is currently seeking a job (finance).     Update from 11/10/23:  Angelica returns today for follow up. She completed pulmonary rehab in July 2023 with marked improvement in exercise capacity. She denies current symptoms of infection or ILD exacerbation. Continues to tolerate MMF dosing at 1500 mg/day, with mild renal impairment that is unchanged (eGFR 65) as of last month. She is also receiving entecavir for hepatitis B. No recent hospitalizations.  She has started Humira around 1-2 months ago.     Updates from today 4/18/24:  Angelica returns for follow up today. She feels better overall with improved energy and exercise tolerance. She is looking for a job (). She continues to tolerate MMF 1000 mg qAM and 500 mg at bedtime with no notable side effects. She is recovering from common cold symptoms and hears herself wheezing which is unusual for her. No recent fever.     Vitals: /70   Pulse 69   Ht 1.676 m (5' 6\")   Wt 73 kg (161 lb)   SpO2 96%   BMI 25.99 kg/m        Exam:   GENERAL APPEARANCE: Well developed, well nourished, alert, and in no apparent distress.  RESP: Reduced air flow bilaterally.  Course inspiratory rales more pronounced in the bases and expiratory wheezes.  CV: Normal S1, S2, regular rhythm, normal rate. No murmur.  No LE edema.   MS: extremities normal. No clubbing. No cyanosis.  SKIN: no rash on limited exam.  NEURO: Mentation intact, speech normal, normal gait and stance.  PSYCH: mentation appears normal. and affect normal/bright.    Results:  Chest imaging:  Reviewed high-resolution CT chest images from today 2/1/2023 with the patient and compared to May 2021  Unchanged typical UIP pattern with extensive honeycombing in both lower lobes, and subpleural distribution " tracking up the upper lobes.  The CT chest from May 2021 did show mild diffuse groundglass opacities, favoring pulmonary edema at the time.      PFTs today 4/18/24        My interpretation: Stable Moderate to severe intrathoracic restrictive disease    PFTs from outside May 2022  FVC 1.91  FEV1 1.61  FEV1/FVC 84  TLC 2.98  DLCO 7.74    PFTs from outside May 2021  FVC 1.86  FEV1 1.51  FEV1/FVC 81  TLC 3.22  DLCO 5.58    PFTs from outside May 2019  FEV1 1.51-59%, FVC 1.92-61%  TLC 2.15-41%  DLCO 31%    Six mins walk on 2 LPM, no desaturation below 89%:      Echo from outside showed mild RV dysfunction and pulmonary artery systolic pressure 20-25  She has history of pulmonary arterial hypertension that followed pregnancy 2005, however a repeat right heart cath in 2019 showed no residual pulm hypertension    Right heart cath May 2018  RA 3  RV 35/8  PA 36/15 [23] from a previous mean high of 47 in 2011, but PCWP was 27 at the time  PCWP 11  PVR 2.2    Previous pulmonary hypertension therapies  Adcirca and revatio to 2013    Assessment and plan:   ILD in polymyositis on MMF 1500 mg/d  Patient is 53 years old female with known history of polymyositis since 2005 following pregnancy, and lung biopsy at the time with established UIP pattern.  The patient has moved in from [place] in 2022.  Back in May 2022, her local pulmonologist at the time reduced her mycophenolate to 1000 mg in the morning and 500 in the evening, which appears to be due to PFT stability and symptomatic improvement. She does not recall having side effect from mycophenolate, but has mild renal impairment that we are monitoring, thought to be secondary to bactrim that she takes for HS by dermatology. Cystatin c last visit reflected normal eGFR. DLCO seems to have stabilized. She used to need oxygen at 4 L/min with activity, however she did not need O2 on six mins walk roger 2022, and 8/2023 requiring 2 LPM towards the end of the six mins walk. For UIP, we  checked ANCA and anti-CCP and both were negative. In light of worsening TLC and DLCO, we re-discussed Ofev today and now agreeable to try it. I previously reached out to hepatology after the visit and they are ok with ofev with usual monitoring protocol. We also briefly discussed lung transplant referral and she was evaluated for it previously in [place] then she improved. I explained that her PFTs are certainly progressing so it will be helpful to have a lung transplant visit.     Plan:  - Increase MMF to 1000 mg po BID   - CBC and CMP today then q3 months at local lab  - Cystatin c to reconfirm normal eGFR while on bactrim   - Lung transplant referral   - Ofev 100 mg po BID for one month then 150 mg po BID   Hepatology provided their input that we can use Ofev with usual lab monitoring per protocol.   - Pulmonary rehab referral   - PFTs in six months   - Follow up with rheumatology Dr Ferrer     2. Hepatitis B on entecavir  Continue follow up with hepatology     3. Previous secondary PAH, revatio and adcirca discontinued in 2019 based on improvements in RHC    4. Immunization  Up to date     5. INGRID vs CKD, mild  Saw nephrology, Cr elevation attributed to bactrim     6. Chronic hypoxic respiratory failure  - Continue Home O2 at 2 LPM with activity   - 2D Echo r/o PH     Return in 6 months   Garrison Bustillo MD  Total time spent today was 40 minutes

## 2024-04-19 NOTE — NURSING NOTE
RN spoke with pt and reviewed medications Ofev and Mycophenolate. Sent pt information via [employer] for her review. Rx updated and sent to pharmacy. Pharmacy liaison notified. Pt has direct nurse number to call if any questions or concerns. RN will fax Pulmonary rehab to [facility].   Marielos Arnold RN BSN

## 2024-04-19 NOTE — PATIENT INSTRUCTIONS
We will call you to confirm changing mycophenolate dose.     Please call our direct ILD nurses line for questions and concerns: [phone]    For scheduling, please call: [phone]

## 2024-04-19 NOTE — TELEPHONE ENCOUNTER
Called and spoke with patient. She states she does not have any specific questions about transplant at this time. Discussed the intake process with her and offered contact information and initial transplant information to review; she requested these be sent via [employer] ([employer]). She expressed appreciation for the call and will begin reviewing materials and call with questions.

## 2024-04-19 NOTE — NURSING NOTE
Chief Complaint   Patient presents with    Interstitial Lung Disease (ILD)     ILD follow up      Vitals were taken and medications were reconciled.    Senait SCOTTA  9:06 AM

## 2024-04-20 LAB
AFP SERPL-MCNC: <1.8 NG/ML
CYSTATIN C (ROCHE): 1 MG/L (ref 0.6–1)
GFR SERPL CREATININE-BSD FRML MDRD: 74 ML/MIN/1.73M2
HBV DNA SERPL NAA+PROBE-ACNC: <10 IU/ML
HBV DNA SERPL NAA+PROBE-LOG IU: <1 {LOG_IU}/ML

## 2024-04-21 LAB
DLCOUNC-%PRED-PRE: 53 %
DLCOUNC-PRE: 11.37 ML/MIN/MMHG
DLCOUNC-PRED: 21.23 ML/MIN/MMHG
ERV-%PRED-PRE: 77 %
ERV-PRE: 0.94 L
ERV-PRED: 1.22 L
EXPTIME-PRE: 4.07 SEC
FEF2575-%PRED-PRE: 48 %
FEF2575-PRE: 1.21 L/SEC
FEF2575-PRED: 2.48 L/SEC
FEFMAX-%PRED-PRE: 65 %
FEFMAX-PRE: 4.47 L/SEC
FEFMAX-PRED: 6.86 L/SEC
FEV1-%PRED-PRE: 52 %
FEV1-PRE: 1.37 L
FEV1FEV6-PRE: 80 %
FEV1FEV6-PRED: 82 %
FEV1FVC-PRE: 80 %
FEV1FVC-PRED: 81 %
FEV1SVC-PRE: 79 %
FEV1SVC-PRED: 74 %
FIFMAX-PRE: 3.74 L/SEC
FVC-%PRED-PRE: 52 %
FVC-PRE: 1.71 L
FVC-PRED: 3.28 L
IC-%PRED-PRE: 32 %
IC-PRE: 0.8 L
IC-PRED: 2.43 L
VA-%PRED-PRE: 48 %
VA-PRE: 2.47 L
VC-%PRED-PRE: 49 %
VC-PRE: 1.74 L
VC-PRED: 3.53 L

## 2024-04-22 ENCOUNTER — TELEPHONE (OUTPATIENT)
Dept: PULMONOLOGY | Facility: CLINIC | Age: 54
End: 2024-04-22
Payer: COMMERCIAL

## 2024-04-22 NOTE — TELEPHONE ENCOUNTER
PA Initiation    Medication: OFEV 150 MG PO CAPS  Insurance Company: [employer] - Phone [phone] Fax [phone]  Pharmacy Filling the Rx: [facility] - [place], [place] - [facility] [address]  Filling Pharmacy Phone:    Filling Pharmacy Fax:    Start Date: 4/22/2024    Key: [id number]

## 2024-04-23 ENCOUNTER — TELEPHONE (OUTPATIENT)
Dept: TRANSPLANT | Facility: CLINIC | Age: 54
End: 2024-04-23
Payer: COMMERCIAL

## 2024-04-23 NOTE — TELEPHONE ENCOUNTER
Patient Call: General  Route to LPN    Reason for call: Angelica returning intakes call, patient stated she's available today for a call back.    Call back needed? Yes    Return Call Needed  Same as documented in contacts section  When to return call?: Same day: [facility]

## 2024-04-25 NOTE — TELEPHONE ENCOUNTER
PRIOR AUTHORIZATION DENIED    Medication: OFEV 150 MG PO CAPS  Insurance Company: [employer] - Phone [phone] Fax [phone]  Denial Date: 4/23/2024  Denial Reason(s): Wrong diagnosis was submitted  Appeal Information: [phone]  Patient Notified: Not yet, resubmitting with correct diagnosis

## 2024-04-25 NOTE — TELEPHONE ENCOUNTER
PA Initiation    Medication: OFEV 150 MG PO CAPS  Insurance Company: [employer] - Phone [phone] Fax [phone]  Pharmacy Filling the Rx: [facility] - [place], [place] - [facility] [address]  Filling Pharmacy Phone:    Filling Pharmacy Fax:    Start Date: 4/25/2024    Key: [id number]

## 2024-04-29 ENCOUNTER — DOCUMENTATION ONLY (OUTPATIENT)
Dept: TRANSPLANT | Facility: CLINIC | Age: 54
End: 2024-04-29
Payer: COMMERCIAL

## 2024-04-30 ENCOUNTER — ANCILLARY PROCEDURE (OUTPATIENT)
Dept: CARDIOLOGY | Facility: CLINIC | Age: 54
End: 2024-04-30
Attending: INTERNAL MEDICINE
Payer: COMMERCIAL

## 2024-04-30 DIAGNOSIS — I27.29 OTHER SECONDARY PULMONARY HYPERTENSION (H): ICD-10-CM

## 2024-04-30 LAB — LVEF ECHO: NORMAL

## 2024-04-30 PROCEDURE — 93306 TTE W/DOPPLER COMPLETE: CPT | Performed by: INTERNAL MEDICINE

## 2024-05-01 DIAGNOSIS — N18.2 CKD (CHRONIC KIDNEY DISEASE) STAGE 2, GFR 60-89 ML/MIN: Primary | ICD-10-CM

## 2024-05-01 NOTE — TELEPHONE ENCOUNTER
COPAY CARD OBTAINED    Medication: OFEV 150 MG PO CAPS    Expected Copay: $ 0  Copay card Monthly Max Amount: $ 11,500  Copay card Annual Amount: $ 138,000

## 2024-05-01 NOTE — TELEPHONE ENCOUNTER
Prior Authorization Approval    Medication: OFEV 150 MG PO CAPS  Authorization Effective Date: 3/27/2024  Authorization Expiration Date: 4/26/2025  Approved Dose/Quantity: #60 per 30 days  Reference #: Key: [id number]   Insurance Company: [employer] - Phone [phone] Fax [phone]  Expected CoPay: $ 0  CoPay Card Available: Yes    Financial Assistance Needed:   Which Pharmacy is filling the prescription: [facility] - [place], [place] - [address]  Pharmacy Notified: Yes  Patient Notified: Yes

## 2024-05-06 DIAGNOSIS — J84.9 ILD (INTERSTITIAL LUNG DISEASE) (H): ICD-10-CM

## 2024-05-06 DIAGNOSIS — M60.10: ICD-10-CM

## 2024-05-09 ENCOUNTER — TELEPHONE (OUTPATIENT)
Dept: TRANSPLANT | Facility: CLINIC | Age: 54
End: 2024-05-09
Payer: COMMERCIAL

## 2024-05-09 NOTE — TELEPHONE ENCOUNTER
Patient Call: General  Route to LPN    Reason for call: Patient returning call back.     Call back needed? Yes    Return Call Needed  Same as documented in contacts section  When to return call?: Same day: [facility]

## 2024-05-10 ENCOUNTER — LAB (OUTPATIENT)
Dept: LAB | Facility: CLINIC | Age: 54
End: 2024-05-10
Payer: COMMERCIAL

## 2024-05-10 ENCOUNTER — OFFICE VISIT (OUTPATIENT)
Dept: NEPHROLOGY | Facility: CLINIC | Age: 54
End: 2024-05-10
Payer: COMMERCIAL

## 2024-05-10 ENCOUNTER — PRE VISIT (OUTPATIENT)
Dept: NEPHROLOGY | Facility: CLINIC | Age: 54
End: 2024-05-10

## 2024-05-10 VITALS
BODY MASS INDEX: 26.1 KG/M2 | HEART RATE: 66 BPM | SYSTOLIC BLOOD PRESSURE: 116 MMHG | DIASTOLIC BLOOD PRESSURE: 74 MMHG | HEIGHT: 66 IN | WEIGHT: 162.4 LBS

## 2024-05-10 DIAGNOSIS — B19.10 HEPATITIS B INFECTION WITHOUT DELTA AGENT WITHOUT HEPATIC COMA, UNSPECIFIED CHRONICITY: ICD-10-CM

## 2024-05-10 DIAGNOSIS — R79.89 ELEVATED SERUM CREATININE: Primary | ICD-10-CM

## 2024-05-10 DIAGNOSIS — J84.9 ILD (INTERSTITIAL LUNG DISEASE) (H): ICD-10-CM

## 2024-05-10 DIAGNOSIS — Z51.81 MEDICATION MONITORING ENCOUNTER: ICD-10-CM

## 2024-05-10 DIAGNOSIS — N18.2 CKD (CHRONIC KIDNEY DISEASE) STAGE 2, GFR 60-89 ML/MIN: ICD-10-CM

## 2024-05-10 LAB
ALBUMIN MFR UR ELPH: 6.1 MG/DL
ALBUMIN SERPL BCG-MCNC: 4.2 G/DL (ref 3.5–5.2)
ALBUMIN UR-MCNC: NEGATIVE MG/DL
ALT SERPL W P-5'-P-CCNC: 13 U/L (ref 0–50)
ANION GAP SERPL CALCULATED.3IONS-SCNC: 10 MMOL/L (ref 7–15)
APPEARANCE UR: CLEAR
AST SERPL W P-5'-P-CCNC: 26 U/L (ref 0–45)
BASOPHILS # BLD AUTO: 0 10E3/UL (ref 0–0.2)
BASOPHILS NFR BLD AUTO: 1 %
BILIRUB UR QL STRIP: NEGATIVE
BUN SERPL-MCNC: 13.3 MG/DL (ref 6–20)
CALCIUM SERPL-MCNC: 9.3 MG/DL (ref 8.6–10)
CHLORIDE SERPL-SCNC: 104 MMOL/L (ref 98–107)
COLOR UR AUTO: NORMAL
CREAT SERPL-MCNC: 1.08 MG/DL (ref 0.51–0.95)
CREAT UR-MCNC: 62.7 MG/DL
CREAT UR-MCNC: 64.2 MG/DL
DEPRECATED HCO3 PLAS-SCNC: 24 MMOL/L (ref 22–29)
EGFRCR SERPLBLD CKD-EPI 2021: 61 ML/MIN/1.73M2
EOSINOPHIL # BLD AUTO: 0.3 10E3/UL (ref 0–0.7)
EOSINOPHIL NFR BLD AUTO: 5 %
ERYTHROCYTE [DISTWIDTH] IN BLOOD BY AUTOMATED COUNT: 14 % (ref 10–15)
GLUCOSE SERPL-MCNC: 91 MG/DL (ref 70–99)
GLUCOSE UR STRIP-MCNC: NEGATIVE MG/DL
HCT VFR BLD AUTO: 42.4 % (ref 35–47)
HGB BLD-MCNC: 13.3 G/DL (ref 11.7–15.7)
HGB UR QL STRIP: NEGATIVE
IMM GRANULOCYTES # BLD: 0 10E3/UL
IMM GRANULOCYTES NFR BLD: 0 %
KETONES UR STRIP-MCNC: NEGATIVE MG/DL
LEUKOCYTE ESTERASE UR QL STRIP: NEGATIVE
LYMPHOCYTES # BLD AUTO: 2.7 10E3/UL (ref 0.8–5.3)
LYMPHOCYTES NFR BLD AUTO: 37 %
MCH RBC QN AUTO: 24.3 PG (ref 26.5–33)
MCHC RBC AUTO-ENTMCNC: 31.4 G/DL (ref 31.5–36.5)
MCV RBC AUTO: 77 FL (ref 78–100)
MICROALBUMIN UR-MCNC: <12 MG/L
MICROALBUMIN/CREAT UR: NORMAL MG/G{CREAT}
MONOCYTES # BLD AUTO: 0.7 10E3/UL (ref 0–1.3)
MONOCYTES NFR BLD AUTO: 9 %
NEUTROPHILS # BLD AUTO: 3.5 10E3/UL (ref 1.6–8.3)
NEUTROPHILS NFR BLD AUTO: 48 %
NITRATE UR QL: NEGATIVE
NRBC # BLD AUTO: 0 10E3/UL
NRBC BLD AUTO-RTO: 0 /100
PH UR STRIP: 5.5 [PH] (ref 5–7)
PHOSPHATE SERPL-MCNC: 3.4 MG/DL (ref 2.5–4.5)
PLATELET # BLD AUTO: 205 10E3/UL (ref 150–450)
POTASSIUM SERPL-SCNC: 4.6 MMOL/L (ref 3.4–5.3)
PROT/CREAT 24H UR: 0.1 MG/MG CR (ref 0–0.2)
RBC # BLD AUTO: 5.48 10E6/UL (ref 3.8–5.2)
RBC URINE: <1 /HPF
SODIUM SERPL-SCNC: 138 MMOL/L (ref 135–145)
SP GR UR STRIP: 1.01 (ref 1–1.03)
UROBILINOGEN UR STRIP-MCNC: NORMAL MG/DL
WBC # BLD AUTO: 7.3 10E3/UL (ref 4–11)
WBC URINE: <1 /HPF

## 2024-05-10 PROCEDURE — 81001 URINALYSIS AUTO W/SCOPE: CPT | Performed by: PATHOLOGY

## 2024-05-10 PROCEDURE — 85025 COMPLETE CBC W/AUTO DIFF WBC: CPT | Performed by: PATHOLOGY

## 2024-05-10 PROCEDURE — 84450 TRANSFERASE (AST) (SGOT): CPT | Performed by: PATHOLOGY

## 2024-05-10 PROCEDURE — 36415 COLL VENOUS BLD VENIPUNCTURE: CPT | Performed by: PATHOLOGY

## 2024-05-10 PROCEDURE — 82043 UR ALBUMIN QUANTITATIVE: CPT | Performed by: INTERNAL MEDICINE

## 2024-05-10 PROCEDURE — 80069 RENAL FUNCTION PANEL: CPT | Performed by: PATHOLOGY

## 2024-05-10 PROCEDURE — 99213 OFFICE O/P EST LOW 20 MIN: CPT | Performed by: INTERNAL MEDICINE

## 2024-05-10 PROCEDURE — 84460 ALANINE AMINO (ALT) (SGPT): CPT | Performed by: PATHOLOGY

## 2024-05-10 PROCEDURE — 99000 SPECIMEN HANDLING OFFICE-LAB: CPT | Performed by: PATHOLOGY

## 2024-05-10 PROCEDURE — 99204 OFFICE O/P NEW MOD 45 MIN: CPT | Mod: GC | Performed by: INTERNAL MEDICINE

## 2024-05-10 PROCEDURE — 84156 ASSAY OF PROTEIN URINE: CPT | Performed by: PATHOLOGY

## 2024-05-10 ASSESSMENT — PAIN SCALES - GENERAL: PAINLEVEL: NO PAIN (0)

## 2024-05-10 NOTE — PATIENT INSTRUCTIONS
Monitor kidney function twice per year and if creatinine above 1.3 then ok to refer back to Nephrology

## 2024-05-10 NOTE — PROGRESS NOTES
Assessment and Plan:    # Creatinine elevation  Baseline creatinine 0.7-0.8 mg/dl and now elevated to 0.9-1.0 since August 2023. Her GFR with creatinine is 61. She had a Cystatin performed that resulted at 1.0 with GFR 74. Her UA is bland, no proteinuria. She doesn't have major renal risk factors. We have discussed that her creatinine elevation might me secondary to Bactrim use due to action at the level of the proximal tubule where creatinine secretion occurs. For this reason there is a creatinine elevation without an actual decrease in GFR. However, we did discuss that Bactrim may cause AIN but this is not common and unlikely with a bland urinary sediment.   - Continue to monitor renal function twice per year   - We are happy to see her back in clinic if her creatinine elevates more than 1.3 mg /dl    Assessment and plan was discussed with patient and she voiced her understanding and agreement.    Consult:  Angelica Jett was seen in consultation, self referred for elevated creatinine.    Reason for Visit:  Angelica Jett is a 53 year old female with creatinine elevation, who presents for evaluation.    HPI:  Ms. Angelica Hernandes is a 53 y.o with a PMH of ILD on chronic immunosupression, Hep B on entecavir and polymyositis.     She was told in August 2023 that she had a creatinine elevation and concern for kidney dysfunction. Her creatinine has remained between 0.9-1.0. She had a Cystatin performed that resulted at 1.0 with GFR 74. No urinary symptoms. No stones, no UTIs.     No family history of kidney disease.     SH: not currently working. No smoking, no drinking.     ROS:   A comprehensive review of systems was obtained and negative, except as noted in the HPI or PMH.    Active Medical Problems:  Patient Active Problem List   Diagnosis    Pulmonary hypertension (H)    Hepatitis B virus infection    Interstitial lung disease (H)    Abscess    Hidradenitis axillaris    Benign tubular adenoma of large  intestine    Diastolic dysfunction    GERD (gastroesophageal reflux disease)    OP (osteoporosis)    Other irritable bowel syndrome    Polymyositis (H)     PMH:   Medical record was reviewed and PMH was discussed with patient and noted below.  Past Medical History:   Diagnosis Date    Diastolic dysfunction 2/24/2014     PSH:   Past Surgical History:   Procedure Laterality Date    BIOPSY  2005    COLONOSCOPY  05/28/2021       Family Hx:   No family history on file.  Personal Hx:   Social History     Tobacco Use    Smoking status: Never    Smokeless tobacco: Never   Substance Use Topics    Alcohol use: Never       Allergies:  Allergies   Allergen Reactions    Iodine Hives, Rash and Other (See Comments)     Patient reports this allergy is because she is allergic to shrimp      Shellfish-Derived Products Difficulty breathing and Hives     Shrimp      Tetanus Toxoids        Medications:  Current Outpatient Medications   Medication Sig Dispense Refill    Calcium Citrate-Vitamin D3 315-6.25 MG-MCG TABS Take 2 tablets by mouth      clobetasol (TEMOVATE) 0.05 % external ointment Apply topically 2 times daily 60 g 0    entecavir (BARACLUDE) 0.5 MG tablet Take 1 tablet (0.5 mg) by mouth daily 90 tablet 3    fluticasone-vilanterol (BREO ELLIPTA) 100-25 MCG/ACT inhaler TAKE 1 PUFF BY MOUTH EVERY DAY 1 each 3    ibuprofen (ADVIL/MOTRIN) 600 MG tablet Take 1 tablet by mouth 4 times daily as needed      mycophenolate (GENERIC EQUIVALENT) 500 MG tablet Take 2 tablets (1,000 mg) by mouth 2 times daily 120 tablet 5    mycophenolate (GENERIC EQUIVALENT) 500 MG tablet 2 tabs qam, 1 tab qpm, total 3 tabs a day 270 tablet 0    nintedanib (OFEV) 150 MG capsule Take 1 capsule (150 mg) by mouth 2 times daily 60 capsule 11    sulfamethoxazole-trimethoprim (BACTRIM DS) 800-160 MG tablet TAKE 1 TABLET BY MOUTH TWICE A  tablet 3    Vitamin D3 (VITAMIN D, CHOLECALCIFEROL,) 25 mcg (1000 units) tablet Take by mouth daily       No current  "facility-administered medications for this visit.      Vitals:  /74   Pulse 66   Ht 1.676 m (5' 6\")   Wt 73.7 kg (162 lb 6.4 oz)   BMI 26.21 kg/m      Exam:  GENERAL APPEARANCE: alert and no distress  HENT: no obvious external lesions  RESP: normal breathing pattern  EDEMA: no LE edema bilaterally  MS: extremities normal - no gross deformities noted  SKIN: no rash  NEURO: AAO x3    LABS:   CMP  Recent Labs   Lab Test 05/10/24  0937 04/19/24  1018 11/10/23  1017 10/12/23  1038 08/18/23  1016    139 137  --  137   POTASSIUM 4.6 4.4 4.0  --  4.4   CHLORIDE 104 104 101  --  102   CO2 24 23 26  --  25   ANIONGAP 10 12 10  --  10   GLC 91 88 81  --  95   BUN 13.3 17.0 17.0  --  22.2*   CR 1.08* 0.98* 1.02* 1.02* 1.00*   GFRESTIMATED 61 68 65 65 67   TIERAR 9.3 9.2 9.6  --  9.8     Recent Labs   Lab Test 05/10/24  0937 04/19/24  1018 11/10/23  1017 10/12/23  1038 06/08/23  1216 05/17/23  0738   BILITOTAL  --  0.2 0.3  --  0.2 0.2   ALKPHOS  --  79 76  --  93 91   ALT 13 16 16 20 23 19   AST 26 25 28 25 31 25     CBC  Recent Labs   Lab Test 05/10/24  0937 04/19/24  1018 11/10/23  1017 10/12/23  1038   HGB 13.3 13.5 13.5 13.9   WBC 7.3 7.0 7.8 9.2   RBC 5.48* 5.65* 5.53* 5.75*   HCT 42.4 44.7 44.0 46.0   MCV 77* 79 80 80   MCH 24.3* 23.9* 24.4* 24.2*   MCHC 31.4* 30.2* 30.7* 30.2*   RDW 14.0 13.8 14.1 14.3    207 194 203     URINE STUDIES  Recent Labs   Lab Test 05/10/24  0953   COLOR Straw   APPEARANCE Clear   URINEGLC Negative   URINEBILI Negative   URINEKETONE Negative   SG 1.011   UBLD Negative   URINEPH 5.5   PROTEIN Negative   NITRITE Negative   LEUKEST Negative   RBCU <1   WBCU <1     No lab results found.  PTH  No lab results found.  IRON STUDIES  No lab results found.      Milena Dotson MD  "

## 2024-05-10 NOTE — NURSING NOTE
"Chief Complaint   Patient presents with    Consult     Establish care with CKD     /74   Pulse 66   Ht 1.676 m (5' 6\")   Wt 73.7 kg (162 lb 6.4 oz)   BMI 26.21 kg/m    Odalys Polanco, Lead CMA  5/10/2024 10:03 AM    "

## 2024-05-15 RX ORDER — MYCOPHENOLATE MOFETIL 500 MG/1
TABLET ORAL
Qty: 270 TABLET | Refills: 1 | Status: SHIPPED | OUTPATIENT
Start: 2024-05-15

## 2024-05-15 NOTE — TELEPHONE ENCOUNTER
mycophenolate (GENERIC EQUIVALENT) 500 MG tablet 120 tablet 5 4/19/2024 -- No   Sig - Route: Take 2 tablets (1,000 mg) by mouth 2 times daily     ----------------------  Last Office Visit : 10/12/23 Mckenzie Ferrer  Future Office visit:  10/17/24 Mckenzie Ferrer  ----------------------      Routing refill request to provider for review/approval because:  MMF not on protocol

## 2024-05-17 ENCOUNTER — ANCILLARY PROCEDURE (OUTPATIENT)
Dept: ULTRASOUND IMAGING | Facility: CLINIC | Age: 54
End: 2024-05-17
Attending: PHYSICIAN ASSISTANT
Payer: COMMERCIAL

## 2024-05-17 DIAGNOSIS — B18.1 CHRONIC VIRAL HEPATITIS B WITHOUT DELTA AGENT AND WITHOUT COMA (H): ICD-10-CM

## 2024-05-17 PROCEDURE — 76705 ECHO EXAM OF ABDOMEN: CPT | Mod: TC | Performed by: RADIOLOGY

## 2024-05-17 RX ORDER — ENTECAVIR 0.5 MG/1
0.5 TABLET, FILM COATED ORAL DAILY
Qty: 90 TABLET | Refills: 1 | Status: SHIPPED | OUTPATIENT
Start: 2024-05-17

## 2024-06-13 ENCOUNTER — OFFICE VISIT (OUTPATIENT)
Dept: DERMATOLOGY | Facility: CLINIC | Age: 54
End: 2024-06-13
Attending: DERMATOLOGY
Payer: COMMERCIAL

## 2024-06-13 VITALS
BODY MASS INDEX: 26.18 KG/M2 | HEART RATE: 82 BPM | DIASTOLIC BLOOD PRESSURE: 69 MMHG | SYSTOLIC BLOOD PRESSURE: 103 MMHG | WEIGHT: 162.2 LBS

## 2024-06-13 DIAGNOSIS — L73.2 HIDRADENITIS SUPPURATIVA: ICD-10-CM

## 2024-06-13 PROCEDURE — 99214 OFFICE O/P EST MOD 30 MIN: CPT | Performed by: DERMATOLOGY

## 2024-06-13 RX ORDER — RESORCINOL
POWDER (GRAM) MISCELLANEOUS
Qty: 30 G | Refills: 11 | Status: SHIPPED | OUTPATIENT
Start: 2024-06-13 | End: 2024-06-13

## 2024-06-13 RX ORDER — RESORCINOL
POWDER (GRAM) MISCELLANEOUS
Qty: 30 G | Refills: 11 | Status: SHIPPED | OUTPATIENT
Start: 2024-06-13

## 2024-06-13 RX ORDER — CLOBETASOL PROPIONATE 0.5 MG/G
OINTMENT TOPICAL 2 TIMES DAILY
Qty: 60 G | Refills: 0 | Status: SHIPPED | OUTPATIENT
Start: 2024-06-13

## 2024-06-13 ASSESSMENT — PAIN SCALES - GENERAL: PAINLEVEL: NO PAIN (0)

## 2024-06-13 NOTE — NURSING NOTE
Dermatology Rooming Note    Angelica Jett's goals for this visit include:   Chief Complaint   Patient presents with    Derm Problem     HS follow-up: Stable overall since last seen       Elva Álvarez LPN

## 2024-06-13 NOTE — LETTER
6/13/2024       RE: Angelica Jett  [address] [place] [address]     Dear Colleague,    Thank you for referring your patient, Angelica Jett, to the [facility] [place] at [facility]. Please see a copy of my visit note below.    [facility] Dermatology Note  Encounter Date: Jun 13, 2024  Office Visit     Dermatology Problem List:  1. Hidradenitis suppurativa with ulcerating/PG-like phenotype in left axilla  - Current Tx: Bactrim (started 5/31/23), mycophenolate, ILK-40 injection right axilla nodule 9/7/23; resorcinol, clobetasol ointment  - Prior Tx: tetracycline (started 4/12/23), prednisone  - Future Considerations: rheumatology and pulmonology supportive of starting Humira, ERIK inhibitors     MHx: Interstitial lung disease. Chronic hepatitis B (taking Entecavir). Polymyositis 2005 (taking mycophenolate).    ____________________________________________    Assessment & Plan:  #  Hidradenitis suppurativa +/- PG vs HS with PG-phenotype.   - Stable  - Discontinue clobetasol as long as tunnel does not open on the left axilla,  - Will try to stop Bactrim 1 tablet BID.   Flare Plan  - Restart Bactrim twice a day if flaring  - Start resorcinol powder mixed with vanicream twice  day (risk of hypopigmentation)  [facility]  Address: [address] [place] [address]  Phone: [phone]   - Start clobetasol ointment twice a day    Follow-up: 8 week(s) virtually (telephone with photos), or earlier for new or changing lesions    Staff and Scribe:     Scribe Disclosure:   I, Sloane Pryor, am serving as a scribe; to document services personally performed by Sanjiv Sotomayor MD -based on data collection and the provider's statements to me.     Provider Disclosure:  I agree with above History, Review of Systems, Physical exam and Plan.  I have reviewed the content of the documentation and have edited it as  needed. I have personally performed the services documented here and the documentation accurately represents those services and the decisions I have made.      Electronically signed by:  Sanjiv Sotomayor MD, FAAD, FACP     Departments of Internal Medicine and Dermatology  [facility]    ____________________________________________    CC: Derm Problem (HS follow-up: Stable overall since last seen/)    HPI:  Ms. Angelica Jett is a(n) 53 year old female who presents today for follow-up  for HS  Last seen in clinic on 12/7/2023  She reports, that her HS is stable. Reports, that she is not getting any new HS flaring areas. Felicianet noticed one area was draining   Patient notes that HS has been stable. Wants to try to stop bactrim  HS Data      6/8/2023    11:00 AM 12/7/2023     2:12 PM 6/13/2024     1:20 PM   HS Exam Data   LC Type LC1 LC1 LC1   Clinical Subtypes Regular type Regular type Regular type   Acne? No No No   Dissecting Cellulitis? No No No   Visual analogue score (0-100) 40     Total Lindsay Stage I  II   Total Inflammatory Nodules 1 1 1   Total Abcesses 0 0 0   Total Draining Tunnels 1 1 2   Total Abscess and Nodule Count 1 1 1   IHS4 Score  5 5 9   Total  HASI Score 27 18 12   HS-PGA 1 3 3       Patient is otherwise feeling well, without additional skin concerns.      Physical Exam:  Vitals: /69   Pulse 82   Wt 73.6 kg (162 lb 3.2 oz)   BMI 26.18 kg/m    SKIN: Focused examination of axillary was performed.  - No other lesions of concern on areas examined.     HS Nurse Assessment        9/7/2023     2:14 PM 12/7/2023     1:36 PM 6/13/2024    12:44 PM   Nurse Assessment Data   Over the past 30 days how many old lesions flared back up? 1 0 1   Over the past 30 days how many new lesions did you get? 0 0 0   Over the past week, how many dressing changes do you do each day? 1 0 0   Over the past week, has your wound drainage been: Mild Mild Mild   Rate your HS overall from 0  - 10 (0 = no disease, 10 = worst) over the past week:  0 0 1   Rate your pain score from 0 - 10 (0 = no disease, 10 = worst) for the most painful/symptomatic lesion in the past week:  0 - No Pain 0 - No Pain 0 - No Pain   Over the past week, how much has HS influenced your quality of life? not at all not at all not at all             Medications:  Current Outpatient Medications   Medication Sig Dispense Refill    Calcium Citrate-Vitamin D3 315-6.25 MG-MCG TABS Take 2 tablets by mouth      clobetasol (TEMOVATE) 0.05 % external ointment Apply topically 2 times daily 60 g 0    entecavir (BARACLUDE) 0.5 MG tablet TAKE ONE TABLET BY MOUTH ONCE DAILY 90 tablet 1    fluticasone-vilanterol (BREO ELLIPTA) 100-25 MCG/ACT inhaler TAKE 1 PUFF BY MOUTH EVERY DAY 1 each 3    ibuprofen (ADVIL/MOTRIN) 600 MG tablet Take 1 tablet by mouth 4 times daily as needed      mycophenolate (GENERIC EQUIVALENT) 500 MG tablet TAKE 2 TABLETS BY MOUTH EVERY MORNING, THEN 1 TABLET EVERY NIGHT (TOTAL 3 TABS A DAY) 270 tablet 1    mycophenolate (GENERIC EQUIVALENT) 500 MG tablet Take 2 tablets (1,000 mg) by mouth 2 times daily 120 tablet 5    nintedanib (OFEV) 150 MG capsule Take 1 capsule (150 mg) by mouth 2 times daily 60 capsule 11    sulfamethoxazole-trimethoprim (BACTRIM DS) 800-160 MG tablet TAKE 1 TABLET BY MOUTH TWICE A  tablet 3    Vitamin D3 (VITAMIN D, CHOLECALCIFEROL,) 25 mcg (1000 units) tablet Take by mouth daily       No current facility-administered medications for this visit.      Past Medical History:   Patient Active Problem List   Diagnosis    Pulmonary hypertension (H)    Hepatitis B virus infection    Interstitial lung disease (H)    Abscess    Hidradenitis axillaris    Benign tubular adenoma of large intestine    Diastolic dysfunction    GERD (gastroesophageal reflux disease)    OP (osteoporosis)    Other irritable bowel syndrome    Polymyositis (H)     Past Medical History:   Diagnosis Date    Diastolic dysfunction  2/24/2014        CC Sanjiv Sotomayor MD  [address] [place] [address]

## 2024-06-13 NOTE — PATIENT INSTRUCTIONS
Will try tp stop bactrim  Flare plan    - Restart bactrim twice a day    - Clobetasol ointment twice a day for ulcerations  - Resorcinol cream twice a day as needed  [facility]  Address: [address] [place] [address], [place] [address]  Phone: [phone]

## 2024-06-13 NOTE — PROGRESS NOTES
[facility] Dermatology Note  Encounter Date: Jun 13, 2024  Office Visit     Dermatology Problem List:  1. Hidradenitis suppurativa with ulcerating/PG-like phenotype in left axilla  - Current Tx: Bactrim (started 5/31/23), mycophenolate, ILK-40 injection right axilla nodule 9/7/23; resorcinol, clobetasol ointment  - Prior Tx: tetracycline (started 4/12/23), prednisone  - Future Considerations: rheumatology and pulmonology supportive of starting Humira, ERIK inhibitors     MHx: Interstitial lung disease. Chronic hepatitis B (taking Entecavir). Polymyositis 2005 (taking mycophenolate).    ____________________________________________    Assessment & Plan:  #  Hidradenitis suppurativa +/- PG vs HS with PG-phenotype.   - Stable  - Discontinue clobetasol as long as tunnel does not open on the left axilla,  - Will try to stop Bactrim 1 tablet BID.   Flare Plan  - Restart Bactrim twice a day if flaring  - Start resorcinol powder mixed with vanicream twice  day (risk of hypopigmentation)  [facility]  Address: [address] [place] [address]  Phone: [phone]   - Start clobetasol ointment twice a day    Follow-up: 8 week(s) virtually (telephone with photos), or earlier for new or changing lesions    Staff and Scribe:     Scribe Disclosure:   I, Sloane Pryor, am serving as a scribe; to document services personally performed by Sanjiv Sotomayor MD -based on data collection and the provider's statements to me.     Provider Disclosure:  I agree with above History, Review of Systems, Physical exam and Plan.  I have reviewed the content of the documentation and have edited it as needed. I have personally performed the services documented here and the documentation accurately represents those services and the decisions I have made.      Electronically signed by:  Sanjiv Sotomayor MD, FAAD, FACP     Departments of Internal Medicine and Dermatology  [facility]  [place]    ____________________________________________    CC: Derm Problem (HS follow-up: Stable overall since last seen/)    HPI:  Ms. Angelica Jett is a(n) 53 year old female who presents today for follow-up  for HS  Last seen in clinic on 12/7/2023  She reports, that her HS is stable. Reports, that she is not getting any new HS flaring areas. Elias noticed one area was draining   Patient notes that HS has been stable. Wants to try to stop bactrim  HS Data      6/8/2023    11:00 AM 12/7/2023     2:12 PM 6/13/2024     1:20 PM   HS Exam Data   LC Type LC1 LC1 LC1   Clinical Subtypes Regular type Regular type Regular type   Acne? No No No   Dissecting Cellulitis? No No No   Visual analogue score (0-100) 40     Total Lindsay Stage I  II   Total Inflammatory Nodules 1 1 1   Total Abcesses 0 0 0   Total Draining Tunnels 1 1 2   Total Abscess and Nodule Count 1 1 1   IHS4 Score  5 5 9   Total  HASI Score 27 18 12   HS-PGA 1 3 3       Patient is otherwise feeling well, without additional skin concerns.      Physical Exam:  Vitals: /69   Pulse 82   Wt 73.6 kg (162 lb 3.2 oz)   BMI 26.18 kg/m    SKIN: Focused examination of axillary was performed.  - No other lesions of concern on areas examined.     HS Nurse Assessment        9/7/2023     2:14 PM 12/7/2023     1:36 PM 6/13/2024    12:44 PM   Nurse Assessment Data   Over the past 30 days how many old lesions flared back up? 1 0 1   Over the past 30 days how many new lesions did you get? 0 0 0   Over the past week, how many dressing changes do you do each day? 1 0 0   Over the past week, has your wound drainage been: Mild Mild Mild   Rate your HS overall from 0 - 10 (0 = no disease, 10 = worst) over the past week:  0 0 1   Rate your pain score from 0 - 10 (0 = no disease, 10 = worst) for the most painful/symptomatic lesion in the past week:  0 - No Pain 0 - No Pain 0 - No Pain   Over the past week, how much has HS influenced your quality of life? not at all  not at all not at all             Medications:  Current Outpatient Medications   Medication Sig Dispense Refill    Calcium Citrate-Vitamin D3 315-6.25 MG-MCG TABS Take 2 tablets by mouth      clobetasol (TEMOVATE) 0.05 % external ointment Apply topically 2 times daily 60 g 0    entecavir (BARACLUDE) 0.5 MG tablet TAKE ONE TABLET BY MOUTH ONCE DAILY 90 tablet 1    fluticasone-vilanterol (BREO ELLIPTA) 100-25 MCG/ACT inhaler TAKE 1 PUFF BY MOUTH EVERY DAY 1 each 3    ibuprofen (ADVIL/MOTRIN) 600 MG tablet Take 1 tablet by mouth 4 times daily as needed      mycophenolate (GENERIC EQUIVALENT) 500 MG tablet TAKE 2 TABLETS BY MOUTH EVERY MORNING, THEN 1 TABLET EVERY NIGHT (TOTAL 3 TABS A DAY) 270 tablet 1    mycophenolate (GENERIC EQUIVALENT) 500 MG tablet Take 2 tablets (1,000 mg) by mouth 2 times daily 120 tablet 5    nintedanib (OFEV) 150 MG capsule Take 1 capsule (150 mg) by mouth 2 times daily 60 capsule 11    sulfamethoxazole-trimethoprim (BACTRIM DS) 800-160 MG tablet TAKE 1 TABLET BY MOUTH TWICE A  tablet 3    Vitamin D3 (VITAMIN D, CHOLECALCIFEROL,) 25 mcg (1000 units) tablet Take by mouth daily       No current facility-administered medications for this visit.      Past Medical History:   Patient Active Problem List   Diagnosis    Pulmonary hypertension (H)    Hepatitis B virus infection    Interstitial lung disease (H)    Abscess    Hidradenitis axillaris    Benign tubular adenoma of large intestine    Diastolic dysfunction    GERD (gastroesophageal reflux disease)    OP (osteoporosis)    Other irritable bowel syndrome    Polymyositis (H)     Past Medical History:   Diagnosis Date    Diastolic dysfunction 2/24/2014        CC Sanjiv Sotomayor MD  [address] [place] [address] on close of this encounter.

## 2024-06-17 ENCOUNTER — TELEPHONE (OUTPATIENT)
Dept: RHEUMATOLOGY | Facility: CLINIC | Age: 54
End: 2024-06-17
Payer: COMMERCIAL

## 2024-06-17 NOTE — TELEPHONE ENCOUNTER
Patient confirmed scheduled appointment:  Date: October 25th, 2024  Time: 4pm  Visit type: Return Rheumatology  Provider: Dr. Ferrer  Location[place] [facility]  Testing/imaging: NA  Additional notes: Rescheduled October 17th, 2024 appt

## 2024-07-01 ENCOUNTER — PATIENT OUTREACH (OUTPATIENT)
Dept: CARE COORDINATION | Facility: CLINIC | Age: 54
End: 2024-07-01
Payer: COMMERCIAL

## 2024-08-01 DIAGNOSIS — J84.9 ILD (INTERSTITIAL LUNG DISEASE) (H): Primary | ICD-10-CM

## 2024-08-03 ENCOUNTER — LAB (OUTPATIENT)
Dept: LAB | Facility: CLINIC | Age: 54
End: 2024-08-03
Payer: COMMERCIAL

## 2024-08-03 DIAGNOSIS — J84.9 ILD (INTERSTITIAL LUNG DISEASE) (H): ICD-10-CM

## 2024-08-03 LAB
ALBUMIN SERPL BCG-MCNC: 4.2 G/DL (ref 3.5–5.2)
ALP SERPL-CCNC: 73 U/L (ref 40–150)
ALT SERPL W P-5'-P-CCNC: 18 U/L (ref 0–50)
AST SERPL W P-5'-P-CCNC: 31 U/L (ref 0–45)
BILIRUB DIRECT SERPL-MCNC: <0.2 MG/DL (ref 0–0.3)
BILIRUB SERPL-MCNC: 0.3 MG/DL
PROT SERPL-MCNC: 8.3 G/DL (ref 6.4–8.3)

## 2024-08-03 PROCEDURE — 36415 COLL VENOUS BLD VENIPUNCTURE: CPT

## 2024-08-03 PROCEDURE — 80076 HEPATIC FUNCTION PANEL: CPT

## 2024-08-07 ENCOUNTER — OFFICE VISIT (OUTPATIENT)
Dept: FAMILY MEDICINE | Facility: CLINIC | Age: 54
End: 2024-08-07
Payer: COMMERCIAL

## 2024-08-07 VITALS
WEIGHT: 161 LBS | BODY MASS INDEX: 25.88 KG/M2 | HEART RATE: 86 BPM | DIASTOLIC BLOOD PRESSURE: 66 MMHG | TEMPERATURE: 98.3 F | HEIGHT: 66 IN | SYSTOLIC BLOOD PRESSURE: 100 MMHG | OXYGEN SATURATION: 96 % | RESPIRATION RATE: 20 BRPM

## 2024-08-07 DIAGNOSIS — L84 CALLUS OF FOOT: ICD-10-CM

## 2024-08-07 DIAGNOSIS — Z12.4 CERVICAL CANCER SCREENING: Primary | ICD-10-CM

## 2024-08-07 DIAGNOSIS — B18.1 CHRONIC VIRAL HEPATITIS B WITHOUT DELTA AGENT AND WITHOUT COMA (H): ICD-10-CM

## 2024-08-07 DIAGNOSIS — M33.20 POLYMYOSITIS (H): ICD-10-CM

## 2024-08-07 DIAGNOSIS — Z13.29 SCREENING FOR THYROID DISORDER: ICD-10-CM

## 2024-08-07 DIAGNOSIS — Z13.1 SCREENING FOR DIABETES MELLITUS: ICD-10-CM

## 2024-08-07 DIAGNOSIS — J84.9 ILD (INTERSTITIAL LUNG DISEASE) (H): ICD-10-CM

## 2024-08-07 DIAGNOSIS — Z00.00 ROUTINE GENERAL MEDICAL EXAMINATION AT A HEALTH CARE FACILITY: ICD-10-CM

## 2024-08-07 DIAGNOSIS — D84.9 IMMUNOSUPPRESSED STATUS (H): ICD-10-CM

## 2024-08-07 DIAGNOSIS — Z78.9 NON-SMOKER: ICD-10-CM

## 2024-08-07 LAB
HBA1C MFR BLD: 5.8 % (ref 0–5.6)
TSH SERPL DL<=0.005 MIU/L-ACNC: 1.86 UIU/ML (ref 0.3–4.2)
VIT B12 SERPL-MCNC: 810 PG/ML (ref 232–1245)

## 2024-08-07 PROCEDURE — 36415 COLL VENOUS BLD VENIPUNCTURE: CPT | Performed by: STUDENT IN AN ORGANIZED HEALTH CARE EDUCATION/TRAINING PROGRAM

## 2024-08-07 PROCEDURE — 82607 VITAMIN B-12: CPT | Performed by: STUDENT IN AN ORGANIZED HEALTH CARE EDUCATION/TRAINING PROGRAM

## 2024-08-07 PROCEDURE — G0145 SCR C/V CYTO,THINLAYER,RESCR: HCPCS | Performed by: STUDENT IN AN ORGANIZED HEALTH CARE EDUCATION/TRAINING PROGRAM

## 2024-08-07 PROCEDURE — 84443 ASSAY THYROID STIM HORMONE: CPT | Performed by: STUDENT IN AN ORGANIZED HEALTH CARE EDUCATION/TRAINING PROGRAM

## 2024-08-07 PROCEDURE — 83036 HEMOGLOBIN GLYCOSYLATED A1C: CPT | Performed by: STUDENT IN AN ORGANIZED HEALTH CARE EDUCATION/TRAINING PROGRAM

## 2024-08-07 PROCEDURE — 99396 PREV VISIT EST AGE 40-64: CPT | Performed by: STUDENT IN AN ORGANIZED HEALTH CARE EDUCATION/TRAINING PROGRAM

## 2024-08-07 PROCEDURE — 87624 HPV HI-RISK TYP POOLED RSLT: CPT | Performed by: STUDENT IN AN ORGANIZED HEALTH CARE EDUCATION/TRAINING PROGRAM

## 2024-08-07 PROCEDURE — 99459 PELVIC EXAMINATION: CPT | Performed by: STUDENT IN AN ORGANIZED HEALTH CARE EDUCATION/TRAINING PROGRAM

## 2024-08-07 SDOH — HEALTH STABILITY: PHYSICAL HEALTH: ON AVERAGE, HOW MANY DAYS PER WEEK DO YOU ENGAGE IN MODERATE TO STRENUOUS EXERCISE (LIKE A BRISK WALK)?: 5 DAYS

## 2024-08-07 ASSESSMENT — SOCIAL DETERMINANTS OF HEALTH (SDOH): HOW OFTEN DO YOU GET TOGETHER WITH FRIENDS OR RELATIVES?: ONCE A WEEK

## 2024-08-07 NOTE — PATIENT INSTRUCTIONS
Patient Education   Preventive Care Advice   This is general advice given by our system to help you stay healthy. However, your care team may have specific advice just for you. Please talk to your care team about your preventive care needs.  Nutrition  Eat 5 or more servings of fruits and vegetables each day.  Try wheat bread, brown rice and whole grain pasta (instead of white bread, rice, and pasta).  Get enough calcium and vitamin D. Check the label on foods and aim for 100% of the RDA (recommended daily allowance).  Lifestyle  Exercise at least 150 minutes each week  (30 minutes a day, 5 days a week).  Do muscle strengthening activities 2 days a week. These help control your weight and prevent disease.  No smoking.  Wear sunscreen to prevent skin cancer.  Have a dental exam and cleaning every 6 months.  Yearly exams  See your health care team every year to talk about:  Any changes in your health.  Any medicines your care team has prescribed.  Preventive care, family planning, and ways to prevent chronic diseases.  Shots (vaccines)   HPV shots (up to age 26), if you've never had them before.  Hepatitis B shots (up to age 59), if you've never had them before.  COVID-19 shot: Get this shot when it's due.  Flu shot: Get a flu shot every year.  Tetanus shot: Get a tetanus shot every 10 years.  Pneumococcal, hepatitis A, and RSV shots: Ask your care team if you need these based on your risk.  Shingles shot (for age 50 and up)  General health tests  Diabetes screening:  Starting at age 35, Get screened for diabetes at least every 3 years.  If you are younger than age 35, ask your care team if you should be screened for diabetes.  Cholesterol test: At age 39, start having a cholesterol test every 5 years, or more often if advised.  Bone density scan (DEXA): At age 50, ask your care team if you should have this scan for osteoporosis (brittle bones).  Hepatitis C: Get tested at least once in your life.  STIs (sexually  transmitted infections)  Before age 24: Ask your care team if you should be screened for STIs.  After age 24: Get screened for STIs if you're at risk. You are at risk for STIs (including HIV) if:  You are sexually active with more than one person.  You don't use condoms every time.  You or a partner was diagnosed with a sexually transmitted infection.  If you are at risk for HIV, ask about PrEP medicine to prevent HIV.  Get tested for HIV at least once in your life, whether you are at risk for HIV or not.  Cancer screening tests  Cervical cancer screening: If you have a cervix, begin getting regular cervical cancer screening tests starting at age 21.  Breast cancer scan (mammogram): If you've ever had breasts, begin having regular mammograms starting at age 40. This is a scan to check for breast cancer.  Colon cancer screening: It is important to start screening for colon cancer at age 45.  Have a colonoscopy test every 10 years (or more often if you're at risk) Or, ask your provider about stool tests like a FIT test every year or Cologuard test every 3 years.  To learn more about your testing options, visit:   .  For help making a decision, visit:   https://bit.ly/ap17071.  Prostate cancer screening test: If you have a prostate, ask your care team if a prostate cancer screening test (PSA) at age 55 is right for you.  Lung cancer screening: If you are a current or former smoker ages 50 to 80, ask your care team if ongoing lung cancer screenings are right for you.  For informational purposes only. Not to replace the advice of your health care provider. Copyright   2023 [employer]. All rights reserved. Clinically reviewed by the [facility] Transitions Program. [employer] 325541 - REV 01/24.

## 2024-08-07 NOTE — PROGRESS NOTES
"Preventive Care Visit  [facility] TADEO Alvarado MD, Family Medicine  Aug 7, 2024      Assessment & Plan     Routine general medical examination at a health care facility    - Vitamin B12; Future  - Vitamin B12    Immunosuppressed status (H24)      ILD (interstitial lung disease) (H)  On mycophenolate and Ofev, managed by pulmonology, managed by rheumatology    Polymyositis (H)  stable    Chronic viral hepatitis B without delta agent and without coma (H)  On Entecavir managed by gastroenterology    Screening for diabetes mellitus    - Hemoglobin A1c; Future  - Hemoglobin A1c    Callus of foot  Patient will continue to monitor    Cervical cancer screening    - Pap Screen with HPV - Recommended Age 30 - 65 Years    Screening for thyroid disorder    - TSH with free T4 reflex; Future  - TSH with free T4 reflex    Non-smoker      Patient has been advised of split billing requirements and indicates understanding: Yes        BMI  Estimated body mass index is 25.99 kg/m  as calculated from the following:    Height as of this encounter: 1.676 m (5' 6\").    Weight as of this encounter: 73 kg (161 lb).   Weight management plan: Discussed healthy diet and exercise guidelines    Counseling  Appropriate preventive services were addressed with this patient via screening, questionnaire, or discussion as appropriate for fall prevention, nutrition, physical activity, Tobacco-use cessation, weight loss and cognition.  Checklist reviewing preventive services available has been given to the patient.  Reviewed patient's diet, addressing concerns and/or questions.           Ana Luisa Dominguez is a 54 year old, presenting for the following:  Physical        8/7/2024     8:41 AM   Additional Questions   Roomed by Wendy ROOT         8/7/2024     8:41 AM   Patient Reported Additional Medications   Patient reports taking the following new medications None        Health Care Directive  Patient does not have a " Health Care Directive or Living Will: Discussed advance care planning with patient; information given to patient to review.    HPI  Patient is presenting today to get established.  Patient with a history of polymyositis, chronic hepatitis B and interstitial lung disease for which she is on medications.  She follows up with rheumatologist, gastroenterologist and pulmonologist.  Patient has chronic intermittent tingling of the toes ongoing for a year.  She will schedule another appointment to discuss it further.  She also has empirically on drainage of right foot.      8/7/2024   General Health   How would you rate your overall physical health? Good   Feel stress (tense, anxious, or unable to sleep) Not at all            8/7/2024   Nutrition   Three or more servings of calcium each day? Yes   Diet: Regular (no restrictions)   How many servings of fruit and vegetables per day? (!) 2-3   How many sweetened beverages each day? 0-1            8/7/2024   Exercise   Days per week of moderate/strenous exercise 5 days            8/7/2024   Social Factors   Frequency of gathering with friends or relatives Once a week   Worry food won't last until get money to buy more No   Food not last or not have enough money for food? No   Do you have housing? (Housing is defined as stable permanent housing and does not include staying ouside in a car, in a tent, in an abandoned building, in an overnight shelter, or couch-surfing.) Yes   Are you worried about losing your housing? No   Lack of transportation? No   Unable to get utilities (heat,electricity)? No            8/7/2024   Fall Risk   Fallen 2 or more times in the past year? No   Trouble with walking or balance? No             8/7/2024   Dental   Dentist two times every year? Yes                 Today's PHQ-2 Score:       4/19/2024     9:05 AM   PHQ-2 ( 1999 [employer])   Q1: Little interest or pleasure in doing things 0   Q2: Feeling down, depressed or hopeless 0   PHQ-2 Score 0          2024   Substance Use   Alcohol more than 3/day or more than 7/wk Not Applicable   Do you use any other substances recreationally? No        Social History     Tobacco Use    Smoking status: Never     Passive exposure: Never    Smokeless tobacco: Never   Vaping Use    Vaping status: Never Used   Substance Use Topics    Alcohol use: Never    Drug use: Never           2024   LAST FHS-7 RESULTS   1st degree relative breast or ovarian cancer No   Any relative bilateral breast cancer No   Any male have breast cancer No   Any ONE woman have BOTH breast AND ovarian cancer No   Any woman with breast cancer before 50yrs No   2 or more relatives with breast AND/OR ovarian cancer No   2 or more relatives with breast AND/OR bowel cancer No          Mammogram Screening - Mammogram every 1-2 years updated in Health Maintenance based on mutual decision making        2024   STI Screening   New sexual partner(s) since last STI/HIV test? No        History of abnormal Pap smear: No - age 30- 64 PAP with HPV every 5 years recommended       ASCVD Risk   The 10-year ASCVD risk score (Darron TORRES, et al., 2019) is: 1.1%    Values used to calculate the score:      Age: 54 years      Sex: Female      Is Non- : Yes      Diabetic: No      Tobacco smoker: No      Systolic Blood Pressure: 100 mmHg      Is BP treated: No      HDL Cholesterol: 51 mg/dL      Total Cholesterol: 142 mg/dL          Reviewed and updated as needed this visit by Provider                    Past Medical History:   Diagnosis Date    Diastolic dysfunction 2014     Past Surgical History:   Procedure Laterality Date    BIOPSY      COLONOSCOPY  2021     OB History    Para Term  AB Living   1 0 0 0 0 1   SAB IAB Ectopic Multiple Live Births   0 0 0 0 0      # Outcome Date GA Lbr Dudley/2nd Weight Sex Type Anes PTL Lv   1                   Review of Systems  Constitutional, neuro, ENT, endocrine,  "pulmonary, cardiac, gastrointestinal, genitourinary, musculoskeletal, integument and psychiatric systems are negative, except as otherwise noted.     Objective    Exam  /66 (BP Location: Left arm, Patient Position: Sitting, Cuff Size: Adult Regular)   Pulse 86   Temp 98.3  F (36.8  C) (Oral)   Resp 20   Ht 1.676 m (5' 6\")   Wt 73 kg (161 lb)   SpO2 96%   BMI 25.99 kg/m     Estimated body mass index is 25.99 kg/m  as calculated from the following:    Height as of this encounter: 1.676 m (5' 6\").    Weight as of this encounter: 73 kg (161 lb).    Physical Exam  GENERAL: alert and no distress  EYES: Eyes grossly normal to inspection, PERRL and conjunctivae and sclerae normal  HENT: ear canals and TM's normal, nose and mouth without ulcers or lesions  NECK: no adenopathy, no asymmetry, masses, or scars  RESP: lungs clear to auscultation - no rales, rhonchi or wheezes  CV: regular rate and rhythm, normal S1 S2, no S3 or S4, no murmur, click or rub, no peripheral edema  ABDOMEN: soft, nontender, no hepatosplenomegaly, no masses and bowel sounds normal  MS: there is callus at the base of the 5th metatarsal no gross musculoskeletal defects noted, no edema  SKIN: no suspicious lesions or rashes  NEURO: Normal strength and tone, mentation intact and speech normal  PSYCH: mentation appears normal, affect normal/bright   (female): normal female external genitalia, normal urethral meatus, vaginal mucosa pink, moist, vulva is absent, normal cervix/adnexa/uterus without masses or discharge,non-erythematous,  A Pap smear was performed.Perineum was normal on inspection.        Signed Electronically by: Ilana Alvarado MD    "

## 2024-08-08 ENCOUNTER — PATIENT OUTREACH (OUTPATIENT)
Dept: GASTROENTEROLOGY | Facility: CLINIC | Age: 54
End: 2024-08-08
Payer: COMMERCIAL

## 2024-08-09 LAB
HPV HR 12 DNA CVX QL NAA+PROBE: NEGATIVE
HPV16 DNA CVX QL NAA+PROBE: NEGATIVE
HPV18 DNA CVX QL NAA+PROBE: NEGATIVE
HUMAN PAPILLOMA VIRUS FINAL DIAGNOSIS: NORMAL

## 2024-08-13 PROBLEM — Z12.4 SCREENING FOR CERVICAL CANCER: Status: ACTIVE | Noted: 2024-08-13

## 2024-08-13 LAB
BKR LAB AP GYN ADEQUACY: NORMAL
BKR LAB AP GYN INTERPRETATION: NORMAL
BKR LAB AP PREVIOUS ABNORMAL: NORMAL
PATH REPORT.COMMENTS IMP SPEC: NORMAL
PATH REPORT.COMMENTS IMP SPEC: NORMAL
PATH REPORT.RELEVANT HX SPEC: NORMAL

## 2024-09-06 ENCOUNTER — LAB (OUTPATIENT)
Dept: LAB | Facility: CLINIC | Age: 54
End: 2024-09-06
Payer: COMMERCIAL

## 2024-09-06 ENCOUNTER — MYC MEDICAL ADVICE (OUTPATIENT)
Dept: PULMONOLOGY | Facility: CLINIC | Age: 54
End: 2024-09-06

## 2024-09-06 DIAGNOSIS — J84.9 ILD (INTERSTITIAL LUNG DISEASE) (H): ICD-10-CM

## 2024-09-06 LAB
ALBUMIN SERPL BCG-MCNC: 4.2 G/DL (ref 3.5–5.2)
ALP SERPL-CCNC: 74 U/L (ref 40–150)
ALT SERPL W P-5'-P-CCNC: 20 U/L (ref 0–50)
ANION GAP SERPL CALCULATED.3IONS-SCNC: 13 MMOL/L (ref 7–15)
AST SERPL W P-5'-P-CCNC: 27 U/L (ref 0–45)
BASOPHILS # BLD AUTO: 0 10E3/UL (ref 0–0.2)
BASOPHILS NFR BLD AUTO: 0 %
BILIRUB DIRECT SERPL-MCNC: <0.2 MG/DL (ref 0–0.3)
BILIRUB SERPL-MCNC: 0.2 MG/DL
BUN SERPL-MCNC: 14 MG/DL (ref 6–20)
CALCIUM SERPL-MCNC: 9.6 MG/DL (ref 8.8–10.4)
CHLORIDE SERPL-SCNC: 105 MMOL/L (ref 98–107)
CREAT SERPL-MCNC: 1.04 MG/DL (ref 0.51–0.95)
EGFRCR SERPLBLD CKD-EPI 2021: 63 ML/MIN/1.73M2
EOSINOPHIL # BLD AUTO: 0.3 10E3/UL (ref 0–0.7)
EOSINOPHIL NFR BLD AUTO: 3 %
ERYTHROCYTE [DISTWIDTH] IN BLOOD BY AUTOMATED COUNT: 13.9 % (ref 10–15)
GLUCOSE SERPL-MCNC: 91 MG/DL (ref 70–99)
HCO3 SERPL-SCNC: 21 MMOL/L (ref 22–29)
HCT VFR BLD AUTO: 45.6 % (ref 35–47)
HGB BLD-MCNC: 14 G/DL (ref 11.7–15.7)
IMM GRANULOCYTES # BLD: 0 10E3/UL
IMM GRANULOCYTES NFR BLD: 0 %
LYMPHOCYTES # BLD AUTO: 3.2 10E3/UL (ref 0.8–5.3)
LYMPHOCYTES NFR BLD AUTO: 36 %
MCH RBC QN AUTO: 24.4 PG (ref 26.5–33)
MCHC RBC AUTO-ENTMCNC: 30.7 G/DL (ref 31.5–36.5)
MCV RBC AUTO: 79 FL (ref 78–100)
MONOCYTES # BLD AUTO: 0.7 10E3/UL (ref 0–1.3)
MONOCYTES NFR BLD AUTO: 8 %
NEUTROPHILS # BLD AUTO: 4.6 10E3/UL (ref 1.6–8.3)
NEUTROPHILS NFR BLD AUTO: 52 %
PLATELET # BLD AUTO: 220 10E3/UL (ref 150–450)
POTASSIUM SERPL-SCNC: 4.5 MMOL/L (ref 3.4–5.3)
PROT SERPL-MCNC: 8.4 G/DL (ref 6.4–8.3)
RBC # BLD AUTO: 5.74 10E6/UL (ref 3.8–5.2)
SODIUM SERPL-SCNC: 139 MMOL/L (ref 135–145)
WBC # BLD AUTO: 8.8 10E3/UL (ref 4–11)

## 2024-09-06 PROCEDURE — 85025 COMPLETE CBC W/AUTO DIFF WBC: CPT

## 2024-09-06 PROCEDURE — 36415 COLL VENOUS BLD VENIPUNCTURE: CPT

## 2024-09-06 PROCEDURE — 82248 BILIRUBIN DIRECT: CPT

## 2024-09-06 PROCEDURE — 80053 COMPREHEN METABOLIC PANEL: CPT

## 2024-09-09 ENCOUNTER — TELEPHONE (OUTPATIENT)
Dept: GASTROENTEROLOGY | Facility: CLINIC | Age: 54
End: 2024-09-09
Payer: COMMERCIAL

## 2024-09-12 ENCOUNTER — VIRTUAL VISIT (OUTPATIENT)
Dept: DERMATOLOGY | Facility: CLINIC | Age: 54
End: 2024-09-12
Payer: COMMERCIAL

## 2024-09-12 DIAGNOSIS — L73.2 HIDRADENITIS SUPPURATIVA: Primary | ICD-10-CM

## 2024-09-12 PROCEDURE — 99207 PR NO CHARGE LOS: CPT | Mod: 93 | Performed by: DERMATOLOGY

## 2024-09-12 ASSESSMENT — PAIN SCALES - GENERAL: PAINLEVEL: NO PAIN (0)

## 2024-09-12 NOTE — LETTER
9/12/2024       RE: Angelica Jett  [address]  [place] [address]     Dear Colleague,    Thank you for referring your patient, Angelica Jett, to the [facility]. Please see a copy of my visit note below.    Unable to get a hold of the patient. Will scheduel for in person      Again, thank you for allowing me to participate in the care of your patient.      Sincerely,    Sanjiv Sotomayor MD

## 2024-09-12 NOTE — NURSING NOTE
Dermatology Rooming Note    Angelica Jett's goals for this visit include:   Chief Complaint   Patient presents with    Derm Problem     HS: Same as last visit      Elva Álvarez LPN

## 2024-09-19 ENCOUNTER — TELEPHONE (OUTPATIENT)
Dept: TRANSPLANT | Facility: CLINIC | Age: 54
End: 2024-09-19
Payer: COMMERCIAL

## 2024-10-10 ENCOUNTER — OFFICE VISIT (OUTPATIENT)
Dept: DERMATOLOGY | Facility: CLINIC | Age: 54
End: 2024-10-10
Payer: COMMERCIAL

## 2024-10-10 VITALS — BODY MASS INDEX: 26.74 KG/M2 | WEIGHT: 165.7 LBS | DIASTOLIC BLOOD PRESSURE: 62 MMHG | SYSTOLIC BLOOD PRESSURE: 117 MMHG

## 2024-10-10 DIAGNOSIS — L73.2 HIDRADENITIS SUPPURATIVA: Primary | ICD-10-CM

## 2024-10-10 PROCEDURE — 99214 OFFICE O/P EST MOD 30 MIN: CPT | Mod: GC | Performed by: DERMATOLOGY

## 2024-10-10 RX ORDER — PREDNISONE 20 MG/1
TABLET ORAL
Qty: 15 TABLET | Refills: 0 | Status: SHIPPED | OUTPATIENT
Start: 2024-10-10 | End: 2024-10-22

## 2024-10-10 RX ORDER — SULFAMETHOXAZOLE AND TRIMETHOPRIM 800; 160 MG/1; MG/1
1 TABLET ORAL 2 TIMES DAILY
Qty: 180 TABLET | Refills: 0 | Status: SHIPPED | OUTPATIENT
Start: 2024-10-10 | End: 2025-01-08

## 2024-10-10 NOTE — PATIENT INSTRUCTIONS
Restart bactrim twice a day   Prednisone taper over 12 days  Bandages ordered. If you have any issues with the bandages call Can call [facility]:  [phone]  Follow-up in 8 weeks in clinic

## 2024-10-10 NOTE — PROGRESS NOTES
[facility] Dermatology Note  Encounter Date: Oct 10, 2024  Office Visit     Dermatology Problem List:  1. Hidradenitis suppurativa with ulcerating/PG-like phenotype in left axilla  - Current Tx: Bactrim (started 5/31/23), mycophenolate, ILK-40 injection right axilla nodule 9/7/23; resorcinol, clobetasol ointment  - Prior Tx: tetracycline (started 4/12/23), prednisone  - Future Considerations: rheumatology and pulmonology supportive of starting Humira, ERIK inhibitors     MHx: Interstitial lung disease. Chronic hepatitis B (taking Entecavir). Polymyositis 2005 (taking mycophenolate).  ____________________________________________    Assessment & Plan:  #  Hidradenitis suppurativa +/- PG vs HS with PG-phenotype.   - Stable  - Discontinued clobetasol and Bactrim at last visit, but left axilla worsened with new nodule appearing in right axilla. Pt noticing improvement after restarting bactrim. Will continue with bactrim in addition to starting Resorcinol, prednisone taper, and restarting clobetasol for flare. In the long term, Humira has been discussed and approved by Pulm and Rheum. Will monitor improvement and possibly discuss humira at next visit pending results.   Flare Plan  - Restart Bactrim 800-160 BID x 14 days   > 90 day refill given   - Start resorcinol powder mixed with vanicream twice  day (risk of hypopigmentation)  - Prednisone taper: 40mg x3 days followed by 30mg x3 days followed by 20mg x3 days then end with 10mg x 3 days.   [facility]  Address: [address] [place] [address], [place] [address]  Phone: [phone]   - Restart clobetasol ointment twice a day    Wound Care Order Documentation    Patient presents for evaluation and treatment of hidradenitis suppurativa (ICD 10: L73.2).  The wound it neither surgical/debrided or related to a burn or pressure injury.  Stage: full thickness    The patient is in need of wound care and dressing changes for management and healing of their current  wound(s).     Name: Angelica Jett  : 1970  INSURER: Payor: MEDICA / Plan: MED[employer] VANTAGE PLUS SELF INSURED / Product Type: Indemnity /   Policy ID#:  [id number]        10/10/2024     4:14 PM   Bandage Order/Info   Wound Size (Left Axilla) LxWxD  [id number]   Drainage Amount (Left Axilla) Moderate   Primary Bandage (Left Axilla) Mepilex with border   Bandage Size (Left Axilla) 4x4 in   Change Freq (Left Axilla) TID     30 day = 90    90 day supply needed.      Follow-up: 8wk in person    This pt was seen and staffed with Dr. Bran Purvis MD  Internal Medicine PGY3    Staff Physician:  I was present with the medical student who participated in the service and in the documentation of the note. I have verified the history and personally performed the physical exam and medical decision making. I agree with the assessment and plan of care as documented in the note.     Sanjiv Sotomayor MD    Department of Dermatology  [facility]note 3[facility]: Phone: [phone], Fax: [phone]  10/18/2024        ____________________________________________    CC:   Chief Complaint   Patient presents with    Derm Problem     Angelica is here today for HS follow up. Currently flaring in underarms.     HPI:  Ms. Angelica Jett is a(n) 54 year old female who presents today for follow-up  for HS.    Last seen in clinic by me on 24. HS was stable, so clobetasol was discontinued as long as left axilla tunnel did not open, and was also to try to stop Bactrim 1 tablet BID. Flare plan was to restart Bactrim BID when flaring, along with starting Resorcinol powder mixed with Vanicream BID and clobetasol ointment BID. Was to follow up virtually on 24, but could not reach patient, and recommended in-person follow-up.    Today, pt is presenting with left axilla tunnel now re-opened and painful nodule on right axilla that began  2 days ago. States that she has restarted her bactrim and has been noticing improvement in pain and size of Right axilla nodule.     Patient is otherwise feeling well, without additional skin concerns.    HS Nurse Assessment        6/13/2024    12:44 PM 9/12/2024     4:10 PM 10/10/2024     4:14 PM   Nurse Assessment Data   Over the past 30 days how many old lesions flared back up? 1 1 1   Over the past 30 days how many new lesions did you get? 0 0 1   Over the past week, how many dressing changes do you do each day? 0 0 3+   Over the past week, has your wound drainage been: Mild None Moderate   Rate your HS overall from 0 - 10 (0 = no disease, 10 = worst) over the past week:  1 1 5   Rate your pain score from 0 - 10 (0 = no disease, 10 = worst) for the most painful/symptomatic lesion in the past week:  0 - No Pain 1 8   Over the past week, how much has HS influenced your quality of life? not at all not at all slightly     Physical Exam:  Vitals:/62   Wt 75.2 kg (165 lb 11.2 oz)   BMI 26.74 kg/m    SKIN: Focused examination of Rt and Left axilla was performed.  - Left Axilla: Draining tunnel with ulceration present [id number] x0.2cm  - Right Axilla: Inflammatory Nodule  and draining tunnel present   - No other lesions of concern on areas examined.   HS Data      12/7/2023     2:12 PM 6/13/2024     1:20 PM 10/10/2024     4:14 PM   HS Exam Data   LC Type LC1 LC1 LC1   Clinical Subtypes Regular type Regular type Regular type   Acne? No No No   Dissecting Cellulitis? No No No   Total Lindsay Stage  II II   Total Inflammatory Nodules 1 1 1   Total Abcesses 0 0 0   Total Draining Tunnels 1 2 2   Total Abscess and Nodule Count 1 1 1   IHS4 Score  5 9 9   Total  HASI Score 18 12    HS-PGA 3 3 3       Medications:  Current Outpatient Medications   Medication Sig Dispense Refill    Calcium Citrate-Vitamin D3 315-6.25 MG-MCG TABS Take 2 tablets by mouth      clobetasol (TEMOVATE) 0.05 % external ointment Apply topically 2  times daily 60 g 0    entecavir (BARACLUDE) 0.5 MG tablet TAKE ONE TABLET BY MOUTH ONCE DAILY 90 tablet 1    fluticasone-vilanterol (BREO ELLIPTA) 100-25 MCG/ACT inhaler TAKE 1 PUFF BY MOUTH EVERY DAY 1 each 3    ibuprofen (ADVIL/MOTRIN) 600 MG tablet Take 1 tablet by mouth 4 times daily as needed (Patient not taking: Reported on 9/12/2024)      mycophenolate (GENERIC EQUIVALENT) 500 MG tablet TAKE 2 TABLETS BY MOUTH EVERY MORNING, THEN 1 TABLET EVERY NIGHT (TOTAL 3 TABS A DAY) (Patient not taking: Reported on 9/12/2024) 270 tablet 1    mycophenolate (GENERIC EQUIVALENT) 500 MG tablet Take 2 tablets (1,000 mg) by mouth 2 times daily 120 tablet 5    nintedanib (OFEV) 150 MG capsule Take 1 capsule (150 mg) by mouth 2 times daily 60 capsule 11    Resorcinol POWD Resorcinol 15% in vanicream twice day as needed for flares (Patient not taking: Reported on 9/12/2024) 30 g 11    sulfamethoxazole-trimethoprim (BACTRIM DS) 800-160 MG tablet TAKE 1 TABLET BY MOUTH TWICE A DAY (Patient not taking: Reported on 9/12/2024) 180 tablet 3    Vitamin D3 (VITAMIN D, CHOLECALCIFEROL,) 25 mcg (1000 units) tablet Take by mouth daily       No current facility-administered medications for this visit.      Past Medical History:   Patient Active Problem List   Diagnosis    Pulmonary hypertension (H)    Chronic viral hepatitis B without delta agent and without coma (H)    Interstitial lung disease (H)    Abscess    Hidradenitis axillaris    Benign tubular adenoma of large intestine    Diastolic dysfunction    GERD (gastroesophageal reflux disease)    OP (osteoporosis)    Other irritable bowel syndrome    Polymyositis (H)    Immunosuppressed status (H)    Screening for cervical cancer     Past Medical History:   Diagnosis Date    Diastolic dysfunction 2/24/2014

## 2024-10-10 NOTE — NURSING NOTE
Dermatology Rooming Note    Angelica Jett's goals for this visit include:   Chief Complaint   Patient presents with    Derm Problem     Angelica is here today for HS follow up. Currently flaring in underarms.

## 2024-10-10 NOTE — LETTER
10/10/2024       RE: Angelica Jett  [address] [place] [address]     Dear Colleague,    Thank you for referring your patient, Angelica Jett, to the [facility] [place] at [facility]. Please see a copy of my visit note below.    [facility] Dermatology Note  Encounter Date: Oct 10, 2024  Office Visit     Dermatology Problem List:  1. Hidradenitis suppurativa with ulcerating/PG-like phenotype in left axilla  - Current Tx: Bactrim (started 5/31/23), mycophenolate, ILK-40 injection right axilla nodule 9/7/23; resorcinol, clobetasol ointment  - Prior Tx: tetracycline (started 4/12/23), prednisone  - Future Considerations: rheumatology and pulmonology supportive of starting Humira, ERIK inhibitors     MHx: Interstitial lung disease. Chronic hepatitis B (taking Entecavir). Polymyositis 2005 (taking mycophenolate).  ____________________________________________    Assessment & Plan:  #  Hidradenitis suppurativa +/- PG vs HS with PG-phenotype.   - Stable  - Discontinued clobetasol and Bactrim at last visit, but left axilla worsened with new nodule appearing in right axilla. Pt noticing improvement after restarting bactrim. Will continue with bactrim in addition to starting Resorcinol, prednisone taper, and restarting clobetasol for flare. In the long term, Humira has been discussed and approved by Pulm and Rheum. Will monitor improvement and possibly discuss humira at next visit pending results.   Flare Plan  - Restart Bactrim 800-160 BID x 14 days   > 90 day refill given   - Start resorcinol powder mixed with vanicream twice  day (risk of hypopigmentation)  - Prednisone taper: 40mg x3 days followed by 30mg x3 days followed by 20mg x3 days then end with 10mg x 3 days.   [facility]  Address: [address] [place] [address], [place] [id number]  Phone: [phone]   - Restart clobetasol ointment twice a day    Wound Care Order  Documentation    Patient presents for evaluation and treatment of hidradenitis suppurativa (ICD 10: L73.2).  The wound it neither surgical/debrided or related to a burn or pressure injury.  Stage: full thickness    The patient is in need of wound care and dressing changes for management and healing of their current wound(s).     Name: Angelica Jett  : 1970  INSURER: Payor: MEDICA / Plan: MED[employer] VANTAGE PLUS SELF INSURED / Product Type: Indemnity /   Policy ID#:  [id number]        10/10/2024     4:14 PM   Bandage Order/Info   Wound Size (Left Axilla) LxWxD  [id number]   Drainage Amount (Left Axilla) Moderate   Primary Bandage (Left Axilla) Mepilex with border   Bandage Size (Left Axilla) 4x4 in   Change Freq (Left Axilla) TID     30 day = 90    90 day supply needed.      Follow-up: 8wk in person    This pt was seen and staffed with Dr. Bran Purvis MD  Internal Medicine PGY3    Staff Physician:  I was present with the medical student who participated in the service and in the documentation of the note. I have verified the history and personally performed the physical exam and medical decision making. I agree with the assessment and plan of care as documented in the note.     Sanjiv Sotomayor MD    Department of Dermatology  [facility]: Phone: [phone], Fax: [phone]  10/18/2024        ____________________________________________    CC:   Chief Complaint   Patient presents with     Derm Problem     Angelica is here today for HS follow up. Currently flaring in underarms.     HPI:  Ms. Angelica Jett is a(n) 54 year old female who presents today for follow-up  for HS.    Last seen in clinic by me on 24. HS was stable, so clobetasol was discontinued as long as left axilla tunnel did not open, and was also to try to stop Bactrim 1 tablet BID. Flare plan was to restart Bactrim BID  when flaring, along with starting Resorcinol powder mixed with Vanicream BID and clobetasol ointment BID. Was to follow up virtually on 9/12/24, but could not reach patient, and recommended in-person follow-up.    Today, pt is presenting with left axilla tunnel now re-opened and painful nodule on right axilla that began 2 days ago. States that she has restarted her bactrim and has been noticing improvement in pain and size of Right axilla nodule.     Patient is otherwise feeling well, without additional skin concerns.    HS Nurse Assessment        6/13/2024    12:44 PM 9/12/2024     4:10 PM 10/10/2024     4:14 PM   Nurse Assessment Data   Over the past 30 days how many old lesions flared back up? 1 1 1   Over the past 30 days how many new lesions did you get? 0 0 1   Over the past week, how many dressing changes do you do each day? 0 0 3+   Over the past week, has your wound drainage been: Mild None Moderate   Rate your HS overall from 0 - 10 (0 = no disease, 10 = worst) over the past week:  1 1 5   Rate your pain score from 0 - 10 (0 = no disease, 10 = worst) for the most painful/symptomatic lesion in the past week:  0 - No Pain 1 8   Over the past week, how much has HS influenced your quality of life? not at all not at all slightly     Physical Exam:  Vitals:/62   Wt 75.2 kg (165 lb 11.2 oz)   BMI 26.74 kg/m    SKIN: Focused examination of Rt and Left axilla was performed.  - Left Axilla: Draining tunnel with ulceration present [id number] x0.2cm  - Right Axilla: Inflammatory Nodule  and draining tunnel present   - No other lesions of concern on areas examined.   HS Data      12/7/2023     2:12 PM 6/13/2024     1:20 PM 10/10/2024     4:14 PM   HS Exam Data   LC Type LC1 LC1 LC1   Clinical Subtypes Regular type Regular type Regular type   Acne? No No No   Dissecting Cellulitis? No No No   Total Lindsay Stage  II II   Total Inflammatory Nodules 1 1 1   Total Abcesses 0 0 0   Total Draining Tunnels 1 2 2   Total  Abscess and Nodule Count 1 1 1   IHS4 Score  5 9 9   Total  HASI Score 18 12    HS-PGA 3 3 3       Medications:  Current Outpatient Medications   Medication Sig Dispense Refill     Calcium Citrate-Vitamin D3 315-6.25 MG-MCG TABS Take 2 tablets by mouth       clobetasol (TEMOVATE) 0.05 % external ointment Apply topically 2 times daily 60 g 0     entecavir (BARACLUDE) 0.5 MG tablet TAKE ONE TABLET BY MOUTH ONCE DAILY 90 tablet 1     fluticasone-vilanterol (BREO ELLIPTA) 100-25 MCG/ACT inhaler TAKE 1 PUFF BY MOUTH EVERY DAY 1 each 3     ibuprofen (ADVIL/MOTRIN) 600 MG tablet Take 1 tablet by mouth 4 times daily as needed (Patient not taking: Reported on 9/12/2024)       mycophenolate (GENERIC EQUIVALENT) 500 MG tablet TAKE 2 TABLETS BY MOUTH EVERY MORNING, THEN 1 TABLET EVERY NIGHT (TOTAL 3 TABS A DAY) (Patient not taking: Reported on 9/12/2024) 270 tablet 1     mycophenolate (GENERIC EQUIVALENT) 500 MG tablet Take 2 tablets (1,000 mg) by mouth 2 times daily 120 tablet 5     nintedanib (OFEV) 150 MG capsule Take 1 capsule (150 mg) by mouth 2 times daily 60 capsule 11     Resorcinol POWD Resorcinol 15% in vanicream twice day as needed for flares (Patient not taking: Reported on 9/12/2024) 30 g 11     sulfamethoxazole-trimethoprim (BACTRIM DS) 800-160 MG tablet TAKE 1 TABLET BY MOUTH TWICE A DAY (Patient not taking: Reported on 9/12/2024) 180 tablet 3     Vitamin D3 (VITAMIN D, CHOLECALCIFEROL,) 25 mcg (1000 units) tablet Take by mouth daily       No current facility-administered medications for this visit.      Past Medical History:   Patient Active Problem List   Diagnosis     Pulmonary hypertension (H)     Chronic viral hepatitis B without delta agent and without coma (H)     Interstitial lung disease (H)     Abscess     Hidradenitis axillaris     Benign tubular adenoma of large intestine     Diastolic dysfunction     GERD (gastroesophageal reflux disease)     OP (osteoporosis)     Other irritable bowel syndrome      Polymyositis (H)     Immunosuppressed status (H)     Screening for cervical cancer     Past Medical History:   Diagnosis Date     Diastolic dysfunction 2/24/2014            Again, thank you for allowing me to participate in the care of your patient.      Sincerely,    Sanjiv Sotomayor MD

## 2024-10-11 ENCOUNTER — LAB (OUTPATIENT)
Dept: LAB | Facility: CLINIC | Age: 54
End: 2024-10-11
Payer: COMMERCIAL

## 2024-10-11 ENCOUNTER — OFFICE VISIT (OUTPATIENT)
Dept: PULMONOLOGY | Facility: CLINIC | Age: 54
End: 2024-10-11
Attending: INTERNAL MEDICINE
Payer: COMMERCIAL

## 2024-10-11 VITALS
WEIGHT: 163 LBS | OXYGEN SATURATION: 97 % | HEIGHT: 66 IN | HEART RATE: 78 BPM | BODY MASS INDEX: 26.2 KG/M2 | DIASTOLIC BLOOD PRESSURE: 77 MMHG | SYSTOLIC BLOOD PRESSURE: 115 MMHG

## 2024-10-11 DIAGNOSIS — M35.9 POLYMYOSITIS ASSOCIATED WITH AUTOIMMUNE DISEASE (H): Primary | ICD-10-CM

## 2024-10-11 DIAGNOSIS — J84.9 ILD (INTERSTITIAL LUNG DISEASE) (H): ICD-10-CM

## 2024-10-11 DIAGNOSIS — B18.1 CHRONIC VIRAL HEPATITIS B WITHOUT DELTA AGENT AND WITHOUT COMA (H): ICD-10-CM

## 2024-10-11 DIAGNOSIS — M33.20 POLYMYOSITIS ASSOCIATED WITH AUTOIMMUNE DISEASE (H): Primary | ICD-10-CM

## 2024-10-11 LAB
6 MIN WALK (FT): 1300 FT
6 MIN WALK (M): 396 M
AFP SERPL-MCNC: <1.8 NG/ML
ALBUMIN SERPL BCG-MCNC: 4.3 G/DL (ref 3.5–5.2)
ALP SERPL-CCNC: 75 U/L (ref 40–150)
ALT SERPL W P-5'-P-CCNC: 22 U/L (ref 0–50)
ANION GAP SERPL CALCULATED.3IONS-SCNC: 11 MMOL/L (ref 7–15)
AST SERPL W P-5'-P-CCNC: 30 U/L (ref 0–45)
BASOPHILS # BLD AUTO: 0 10E3/UL (ref 0–0.2)
BASOPHILS NFR BLD AUTO: 0 %
BILIRUB DIRECT SERPL-MCNC: <0.2 MG/DL (ref 0–0.3)
BILIRUB SERPL-MCNC: 0.3 MG/DL
BUN SERPL-MCNC: 8.8 MG/DL (ref 6–20)
CALCIUM SERPL-MCNC: 9.8 MG/DL (ref 8.8–10.4)
CHLORIDE SERPL-SCNC: 103 MMOL/L (ref 98–107)
CREAT SERPL-MCNC: 1.12 MG/DL (ref 0.51–0.95)
EGFRCR SERPLBLD CKD-EPI 2021: 58 ML/MIN/1.73M2
EOSINOPHIL # BLD AUTO: 0.2 10E3/UL (ref 0–0.7)
EOSINOPHIL NFR BLD AUTO: 3 %
ERYTHROCYTE [DISTWIDTH] IN BLOOD BY AUTOMATED COUNT: 14.1 % (ref 10–15)
GLUCOSE SERPL-MCNC: 83 MG/DL (ref 70–99)
HCO3 SERPL-SCNC: 26 MMOL/L (ref 22–29)
HCT VFR BLD AUTO: 45.7 % (ref 35–47)
HGB BLD-MCNC: 13.9 G/DL (ref 11.7–15.7)
IMM GRANULOCYTES # BLD: 0 10E3/UL
IMM GRANULOCYTES NFR BLD: 0 %
LYMPHOCYTES # BLD AUTO: 2.4 10E3/UL (ref 0.8–5.3)
LYMPHOCYTES NFR BLD AUTO: 36 %
MCH RBC QN AUTO: 24.4 PG (ref 26.5–33)
MCHC RBC AUTO-ENTMCNC: 30.4 G/DL (ref 31.5–36.5)
MCV RBC AUTO: 80 FL (ref 78–100)
MONOCYTES # BLD AUTO: 0.7 10E3/UL (ref 0–1.3)
MONOCYTES NFR BLD AUTO: 10 %
NEUTROPHILS # BLD AUTO: 3.4 10E3/UL (ref 1.6–8.3)
NEUTROPHILS NFR BLD AUTO: 51 %
NRBC # BLD AUTO: 0 10E3/UL
NRBC BLD AUTO-RTO: 0 /100
PLATELET # BLD AUTO: 209 10E3/UL (ref 150–450)
POTASSIUM SERPL-SCNC: 4.4 MMOL/L (ref 3.4–5.3)
PROT SERPL-MCNC: 8.2 G/DL (ref 6.4–8.3)
RBC # BLD AUTO: 5.69 10E6/UL (ref 3.8–5.2)
SODIUM SERPL-SCNC: 140 MMOL/L (ref 135–145)
WBC # BLD AUTO: 6.8 10E3/UL (ref 4–11)

## 2024-10-11 PROCEDURE — 99213 OFFICE O/P EST LOW 20 MIN: CPT | Performed by: INTERNAL MEDICINE

## 2024-10-11 PROCEDURE — 94618 PULMONARY STRESS TESTING: CPT | Performed by: INTERNAL MEDICINE

## 2024-10-11 PROCEDURE — 99000 SPECIMEN HANDLING OFFICE-LAB: CPT | Performed by: PATHOLOGY

## 2024-10-11 PROCEDURE — 36415 COLL VENOUS BLD VENIPUNCTURE: CPT | Performed by: PATHOLOGY

## 2024-10-11 PROCEDURE — 99214 OFFICE O/P EST MOD 30 MIN: CPT | Mod: 25 | Performed by: INTERNAL MEDICINE

## 2024-10-11 PROCEDURE — 85025 COMPLETE CBC W/AUTO DIFF WBC: CPT | Performed by: PATHOLOGY

## 2024-10-11 PROCEDURE — 94729 DIFFUSING CAPACITY: CPT | Performed by: INTERNAL MEDICINE

## 2024-10-11 PROCEDURE — 80053 COMPREHEN METABOLIC PANEL: CPT | Performed by: PATHOLOGY

## 2024-10-11 PROCEDURE — 94726 PLETHYSMOGRAPHY LUNG VOLUMES: CPT | Performed by: INTERNAL MEDICINE

## 2024-10-11 PROCEDURE — 87517 HEPATITIS B DNA QUANT: CPT | Performed by: PHYSICIAN ASSISTANT

## 2024-10-11 PROCEDURE — 82105 ALPHA-FETOPROTEIN SERUM: CPT | Performed by: PHYSICIAN ASSISTANT

## 2024-10-11 PROCEDURE — 82248 BILIRUBIN DIRECT: CPT | Performed by: PATHOLOGY

## 2024-10-11 PROCEDURE — 94375 RESPIRATORY FLOW VOLUME LOOP: CPT | Performed by: INTERNAL MEDICINE

## 2024-10-11 ASSESSMENT — PAIN SCALES - GENERAL: PAINLEVEL: NO PAIN (0)

## 2024-10-11 NOTE — PROGRESS NOTES
[facility] Interstitial Lung Disease Clinic    Reason for Visit  Angelica Jett is a 52 year old year old female who is being seen for known polymyositis related ILD.   HPI  Patient is 52 years old female [place] from [place] was in usual state of health until she delivered a healthy child back in 2005 and developed interstitial lung disease and associated muscle pain.  At the time, she reportedly had a lung biopsy in [place] which in association with polymyositis clinical diagnosis [reportedly had thigh MRI], received a diagnosis of ILD associated with polymyositis.  Her main immunosuppressive regimen has been mycophenolate mofetil, which has been decreased back in May 2022 to 1500 mg/day which appears to be due to PFT stability and symptom improvement.  Earlier in the disease, lung transplant was considered as she was needing oxygen, but as she responded to immunosuppression lung transplant has been deferred.  She has also been receiving pulmonary rehab with significant improvement in symptoms.  She admits to some interruption in her mycophenolate and with that her symptoms worsened back in 2022.  She also has known hepatitis B receiving Entecavir with normal liver function as of late 2022.  She works as a  and has no occupational exposures.  She is a lifetime non-smoker and denies secondhand smoke exposure.  She denies triggers for hypersensitivity pneumonitis.  She also had pulmonary hypertension (2013) and is needed Adcirca and revatio, but they were discontinued based on improvement in her pulmonary pressures on right heart cath 2019.    Updates from 8/18/23  Patient returns for follow up. Has seen rheumatology and dermatology, her most active problem now is Hidradenitis suppurativa with ulceration, received bactrim with no improvement, has been seeing wound clinic and her wound is starting to heal. She was offered humira and we supported this decision but she remains very  "reluctant to start it, concerned about pulmonary side effects. Her level of activity is unchanged over the last 6 months. She completed pulmonary rheb and feels benefit. She uses O2 at 2 LPM with exercise. She is currently seeking a job (finance).     Update from 11/10/23:  Angelica returns today for follow up. She completed pulmonary rehab in July 2023 with marked improvement in exercise capacity. She denies current symptoms of infection or ILD exacerbation. Continues to tolerate MMF dosing at 1500 mg/day, with mild renal impairment that is unchanged (eGFR 65) as of last month. She is also receiving entecavir for hepatitis B. No recent hospitalizations.  She has started Humira around 1-2 months ago.     Updates from 4/18/24:  Angelica returns for follow up today. She feels better overall with improved energy and exercise tolerance. She is looking for a job (). She continues to tolerate MMF 1000 mg qAM and 500 mg at bedtime with no notable side effects. She is recovering from common cold symptoms and hears herself wheezing which is unusual for her. No recent fever.     Updates from 10/11/24:  Angelica is here today for follow up. She feels she can tolerate exercise better over the last few months. She is using O2 with workout sometimes. Unable to measure SpO2 at home as the monitor she has slips or doesn't fit her finger well. She denies recent cough. She is taking MMF 1000 BID with compliance, but Reggie has troubled her with GI side effects, and she has been skipping it some days (on average taking 5 days per week) and only taking a morning dose. She is unsure if she has 100 mg or 150 mg. She doesn't want to take imodium for diarrhea management due to concerns about imodium side effects.     Vitals: /77 (BP Location: Right arm, Patient Position: Sitting, Cuff Size: Adult Regular)   Pulse 78   Ht 1.676 m (5' 6\")   Wt 73.9 kg (163 lb)   SpO2 97%   BMI 26.31 kg/m        Exam:   GENERAL APPEARANCE: " Well developed, well nourished, alert, and in no apparent distress.  RESP: Reduced air flow bilaterally.  Course inspiratory rales more pronounced in the bases.   CV: Normal S1, S2, regular rhythm, normal rate. No murmur.  No LE edema.   MS: extremities normal. No clubbing. No cyanosis.  SKIN: no rash on limited exam.  NEURO: Mentation intact, speech normal, normal gait and stance.  PSYCH: mentation appears normal. and affect normal/bright.    Results:  Chest imaging:  Reviewed high-resolution CT chest images from today 2/1/2023 with the patient and compared to May 2021  Unchanged typical UIP pattern with extensive honeycombing in both lower lobes, and subpleural distribution tracking up the upper lobes.  The CT chest from May 2021 did show mild diffuse groundglass opacities, favoring pulmonary edema at the time.      PFTs today 10/11/24:        Wt Readings from Last 4 Encounters:   10/11/24 73.9 kg (163 lb)   10/10/24 75.2 kg (165 lb 11.2 oz)   08/07/24 73 kg (161 lb)   06/13/24 73.6 kg (162 lb 3.2 oz)         My interpretation: Stable Moderate to severe intrathoracic restrictive disease    PFTs from outside May 2022  FVC 1.91  FEV1 1.61  FEV1/FVC 84  TLC 2.98  DLCO 7.74    PFTs from outside May 2021  FVC 1.86  FEV1 1.51  FEV1/FVC 81  TLC 3.22  DLCO 5.58    PFTs from outside May 2019  FEV1 1.51-59%, FVC 1.92-61%  TLC 2.15-41%  DLCO 31%    Six mins walk on 2 LPM, no desaturation below 89%:      Echo from outside showed mild RV dysfunction and pulmonary artery systolic pressure 20-25  She has history of pulmonary arterial hypertension that followed pregnancy 2005, however a repeat right heart cath in 2019 showed no residual pulm hypertension    Right heart cath May 2018  RA 3  RV 35/8  PA 36/15 [23] from a previous mean high of 47 in 2011, but PCWP was 27 at the time  PCWP 11  PVR 2.2    Previous pulmonary hypertension therapies  Adcirca and revatio to 2013    Assessment and plan:   ILD in polymyositis on MMF 1500  mg/d  Patient is 53 years old female with known history of polymyositis since 2005 following pregnancy, and lung biopsy at the time with established UIP pattern.  The patient has moved in from [place] in 2022.  Back in May 2022, her local pulmonologist at the time reduced her mycophenolate to 1000 mg in the morning and 500 in the evening, which appears to be due to PFT stability and symptomatic improvement. She does not recall having side effect from mycophenolate, but has mild renal impairment that we are monitoring, thought to be secondary to bactrim that she takes for HS by dermatology. Cystatin c last visit reflected normal eGFR. DLCO seems to be slowly improving. She used to need oxygen at 4 L/min with activity, however she did not need O2 on six mins walk roger 2022, and 8/2023 requiring 2 LPM towards the end of the six mins walk. For UIP, we checked ANCA and anti-CCP and both were negative. In light of worsening TLC and DLCO, previously we started Ofev but she is unable to tolerate BID dosing due to GI side effects, and taking only morning dose. She is unsure if she is on the 100 or 150 mg dose and she will check and let us know. If she is taking only 100 mg qAm, and as she has missed a few doses recently, I would discontinue Ofev as it won't be effective at such low dose. Her Cr is elevated today which is probably related to bactrim that has been re-initiated a week ago for her HS.  I am deferring lung transplant visit for now that was previously requested given stability in PFTs and improved exercise tolerance.     Plan:  - Continue MMF to 1000 mg po BID   - CBC and CMP q3 months at local lab  - Continue exercising with O2  - SpO2 monitor OTC   - PFTs in six months   - Follow up with rheumatology Dr Ferrer     2. Hepatitis B on entecavir  Continue follow up with hepatology     3. Previous secondary PAH, revatio and adcirca discontinued in 2019 based on improvements in RHC    4. Immunization  Up to date      5. INGRID vs CKD, mild  Saw nephrology, Cr elevation attributed to bactrim     6. Chronic hypoxic respiratory failure  - Continue Home O2 at 2 LPM with activity     Return in 6 months   Garrison Bustillo MD

## 2024-10-11 NOTE — NURSING NOTE
Chief Complaint   Patient presents with    Interstitial Lung Disease (ILD)     ILD follow up      Vitals were taken and medications were reconciled.    Senait Kennedy RMA  9:26 AM

## 2024-10-11 NOTE — LETTER
10/11/2024      Angelica Jett  [address]  [place] [address]      Dear Colleague,    Thank you for referring your patient, Angelica Jett, to the [facility] AND [facility] [place]. Please see a copy of my visit note below.    [facility]    Reason for Visit  Angelica Jett is a 52 year old year old female who is being seen for known polymyositis related ILD.   HPI  Patient is 52 years old female [place] from [place] was in usual state of health until she delivered a healthy child back in 2005 and developed interstitial lung disease and associated muscle pain.  At the time, she reportedly had a lung biopsy in [place] which in association with polymyositis clinical diagnosis [reportedly had thigh MRI], received a diagnosis of ILD associated with polymyositis.  Her main immunosuppressive regimen has been mycophenolate mofetil, which has been decreased back in May 2022 to 1500 mg/day which appears to be due to PFT stability and symptom improvement.  Earlier in the disease, lung transplant was considered as she was needing oxygen, but as she responded to immunosuppression lung transplant has been deferred.  She has also been receiving pulmonary rehab with significant improvement in symptoms.  She admits to some interruption in her mycophenolate and with that her symptoms worsened back in 2022.  She also has known hepatitis B receiving Entecavir with normal liver function as of late 2022.  She works as a  and has no occupational exposures.  She is a lifetime non-smoker and denies secondhand smoke exposure.  She denies triggers for hypersensitivity pneumonitis.  She also had pulmonary hypertension (2013) and is needed Adcirca and revatio, but they were discontinued based on improvement in her pulmonary pressures on right heart cath 2019.    Updates from 8/18/23  Patient returns for follow up. Has seen  rheumatology and dermatology, her most active problem now is Hidradenitis suppurativa with ulceration, received bactrim with no improvement, has been seeing wound clinic and her wound is starting to heal. She was offered humira and we supported this decision but she remains very reluctant to start it, concerned about pulmonary side effects. Her level of activity is unchanged over the last 6 months. She completed pulmonary rheb and feels benefit. She uses O2 at 2 LPM with exercise. She is currently seeking a job (finance).     Update from 11/10/23:  Angelica returns today for follow up. She completed pulmonary rehab in July 2023 with marked improvement in exercise capacity. She denies current symptoms of infection or ILD exacerbation. Continues to tolerate MMF dosing at 1500 mg/day, with mild renal impairment that is unchanged (eGFR 65) as of last month. She is also receiving entecavir for hepatitis B. No recent hospitalizations.  She has started Humira around 1-2 months ago.     Updates from 4/18/24:  Angelica returns for follow up today. She feels better overall with improved energy and exercise tolerance. She is looking for a job (). She continues to tolerate MMF 1000 mg qAM and 500 mg at bedtime with no notable side effects. She is recovering from common cold symptoms and hears herself wheezing which is unusual for her. No recent fever.     Updates from 10/11/24:  Angelica is here today for follow up. She feels she can tolerate exercise better over the last few months. She is using O2 with workout sometimes. Unable to measure SpO2 at home as the monitor she has slips or doesn't fit her finger well. She denies recent cough. She is taking MMF 1000 BID with compliance, but Reggie has troubled her with GI side effects, and she has been skipping it some days (on average taking 5 days per week) and only taking a morning dose. She is unsure if she has 100 mg or 150 mg. She doesn't want to take imodium for  "diarrhea management due to concerns about imodium side effects.     Vitals: /77 (BP Location: Right arm, Patient Position: Sitting, Cuff Size: Adult Regular)   Pulse 78   Ht 1.676 m (5' 6\")   Wt 73.9 kg (163 lb)   SpO2 97%   BMI 26.31 kg/m        Exam:   GENERAL APPEARANCE: Well developed, well nourished, alert, and in no apparent distress.  RESP: Reduced air flow bilaterally.  Course inspiratory rales more pronounced in the bases.   CV: Normal S1, S2, regular rhythm, normal rate. No murmur.  No LE edema.   MS: extremities normal. No clubbing. No cyanosis.  SKIN: no rash on limited exam.  NEURO: Mentation intact, speech normal, normal gait and stance.  PSYCH: mentation appears normal. and affect normal/bright.    Results:  Chest imaging:  Reviewed high-resolution CT chest images from today 2/1/2023 with the patient and compared to May 2021  Unchanged typical UIP pattern with extensive honeycombing in both lower lobes, and subpleural distribution tracking up the upper lobes.  The CT chest from May 2021 did show mild diffuse groundglass opacities, favoring pulmonary edema at the time.      PFTs today 10/11/24:        Wt Readings from Last 4 Encounters:   10/11/24 73.9 kg (163 lb)   10/10/24 75.2 kg (165 lb 11.2 oz)   08/07/24 73 kg (161 lb)   06/13/24 73.6 kg (162 lb 3.2 oz)         My interpretation: Stable Moderate to severe intrathoracic restrictive disease    PFTs from outside May 2022  FVC 1.91  FEV1 1.61  FEV1/FVC 84  TLC 2.98  DLCO 7.74    PFTs from outside May 2021  FVC 1.86  FEV1 1.51  FEV1/FVC 81  TLC 3.22  DLCO 5.58    PFTs from outside May 2019  FEV1 1.51-59%, FVC 1.92-61%  TLC 2.15-41%  DLCO 31%    Six mins walk on 2 LPM, no desaturation below 89%:      Echo from outside showed mild RV dysfunction and pulmonary artery systolic pressure 20-25  She has history of pulmonary arterial hypertension that followed pregnancy 2005, however a repeat right heart cath in 2019 showed no residual pulm " hypertension    Right heart cath May 2018  RA 3  RV 35/8  PA 36/15 [23] from a previous mean high of 47 in 2011, but PCWP was 27 at the time  PCWP 11  PVR 2.2    Previous pulmonary hypertension therapies  Adcirca and revatio to 2013    Assessment and plan:   ILD in polymyositis on MMF 1500 mg/d  Patient is 53 years old female with known history of polymyositis since 2005 following pregnancy, and lung biopsy at the time with established UIP pattern.  The patient has moved in from [place] in 2022.  Back in May 2022, her local pulmonologist at the time reduced her mycophenolate to 1000 mg in the morning and 500 in the evening, which appears to be due to PFT stability and symptomatic improvement. She does not recall having side effect from mycophenolate, but has mild renal impairment that we are monitoring, thought to be secondary to bactrim that she takes for HS by dermatology. Cystatin c last visit reflected normal eGFR. DLCO seems to be slowly improving. She used to need oxygen at 4 L/min with activity, however she did not need O2 on six mins walk roger 2022, and 8/2023 requiring 2 LPM towards the end of the six mins walk. For UIP, we checked ANCA and anti-CCP and both were negative. In light of worsening TLC and DLCO, previously we started Ofev but she is unable to tolerate BID dosing due to GI side effects, and taking only morning dose. She is unsure if she is on the 100 or 150 mg dose and she will check and let us know. If she is taking only 100 mg qAm, and as she has missed a few doses recently, I would discontinue Ofev as it won't be effective at such low dose. Her Cr is elevated today which is probably related to bactrim that has been re-initiated a week ago for her HS.  I am deferring lung transplant visit for now that was previously requested given stability in PFTs and improved exercise tolerance.     Plan:  - Continue MMF to 1000 mg po BID   - CBC and CMP q3 months at local lab  - Continue exercising with  O2  - SpO2 monitor OTC   - PFTs in six months   - Follow up with rheumatology Dr Ferrer     2. Hepatitis B on entecavir  Continue follow up with hepatology     3. Previous secondary PAH, revatio and adcirca discontinued in 2019 based on improvements in RHC    4. Immunization  Up to date     5. INGRID vs CKD, mild  Saw nephrology, Cr elevation attributed to bactrim     6. Chronic hypoxic respiratory failure  - Continue Home O2 at 2 LPM with activity     Return in 6 months   Garrison Bustillo MD      Again, thank you for allowing me to participate in the care of your patient.        Sincerely,        Garrison Bustillo MD

## 2024-10-12 LAB
DLCOCOR-%PRED-PRE: 51 %
DLCOCOR-PRE: 10.81 ML/MIN/MMHG
DLCOUNC-%PRED-PRE: 51 %
DLCOUNC-PRE: 10.98 ML/MIN/MMHG
DLCOUNC-PRED: 21.19 ML/MIN/MMHG
ERV-%PRED-PRE: 52 %
ERV-PRE: 0.64 L
ERV-PRED: 1.21 L
EXPTIME-PRE: 4.01 SEC
FEF2575-%PRED-PRE: 53 %
FEF2575-PRE: 1.33 L/SEC
FEF2575-PRED: 2.46 L/SEC
FEFMAX-%PRED-PRE: 77 %
FEFMAX-PRE: 5.28 L/SEC
FEFMAX-PRED: 6.84 L/SEC
FEV1-%PRED-PRE: 54 %
FEV1-PRE: 1.43 L
FEV1FEV6-PRE: 81 %
FEV1FEV6-PRED: 81 %
FEV1FVC-PRE: 81 %
FEV1FVC-PRED: 80 %
FEV1SVC-PRE: 77 %
FEV1SVC-PRED: 74 %
FIFMAX-PRE: 4.66 L/SEC
FRCPLETH-%PRED-PRE: 70 %
FRCPLETH-PRE: 1.99 L
FRCPLETH-PRED: 2.81 L
FVC-%PRED-PRE: 53 %
FVC-PRE: 1.75 L
FVC-PRED: 3.27 L
IC-%PRED-PRE: 50 %
IC-PRE: 1.22 L
IC-PRED: 2.43 L
RVPLETH-%PRED-PRE: 70 %
RVPLETH-PRE: 1.35 L
RVPLETH-PRED: 1.92 L
TLCPLETH-%PRED-PRE: 60 %
TLCPLETH-PRE: 3.21 L
TLCPLETH-PRED: 5.27 L
VA-%PRED-PRE: 51 %
VA-PRE: 2.63 L
VC-%PRED-PRE: 52 %
VC-PRE: 1.86 L
VC-PRED: 3.52 L

## 2024-10-14 ENCOUNTER — MYC MEDICAL ADVICE (OUTPATIENT)
Dept: PULMONOLOGY | Facility: CLINIC | Age: 54
End: 2024-10-14
Payer: COMMERCIAL

## 2024-10-14 LAB — HBV DNA SERPL NAA+PROBE-ACNC: NOT DETECTED IU/ML

## 2024-10-25 ENCOUNTER — OFFICE VISIT (OUTPATIENT)
Dept: RHEUMATOLOGY | Facility: CLINIC | Age: 54
End: 2024-10-25
Attending: INTERNAL MEDICINE
Payer: COMMERCIAL

## 2024-10-25 VITALS
HEART RATE: 78 BPM | TEMPERATURE: 97.4 F | SYSTOLIC BLOOD PRESSURE: 127 MMHG | BODY MASS INDEX: 27 KG/M2 | DIASTOLIC BLOOD PRESSURE: 81 MMHG | HEIGHT: 66 IN | OXYGEN SATURATION: 96 % | WEIGHT: 168 LBS

## 2024-10-25 DIAGNOSIS — Z51.81 MEDICATION MONITORING ENCOUNTER: ICD-10-CM

## 2024-10-25 DIAGNOSIS — M60.10: Primary | ICD-10-CM

## 2024-10-25 DIAGNOSIS — J84.9 ILD (INTERSTITIAL LUNG DISEASE) (H): ICD-10-CM

## 2024-10-25 PROCEDURE — G2211 COMPLEX E/M VISIT ADD ON: HCPCS | Performed by: INTERNAL MEDICINE

## 2024-10-25 PROCEDURE — 99213 OFFICE O/P EST LOW 20 MIN: CPT | Performed by: INTERNAL MEDICINE

## 2024-10-25 PROCEDURE — 99214 OFFICE O/P EST MOD 30 MIN: CPT | Performed by: INTERNAL MEDICINE

## 2024-10-25 ASSESSMENT — PAIN SCALES - GENERAL: PAINLEVEL_OUTOF10: NO PAIN (0)

## 2024-10-25 NOTE — NURSING NOTE
"Chief Complaint   Patient presents with    RECHECK     Follow Up     /81   Pulse 78   Temp 97.4  F (36.3  C)   Ht 1.676 m (5' 6\")   Wt 76.2 kg (168 lb)   SpO2 96%   BMI 27.12 kg/m    Melida Ramirez on 10/25/2024 at 3:58 PM    "

## 2024-10-25 NOTE — PROGRESS NOTES
Rheumatology Clinic Follow Up  Date of Service 10/25/2024  Last Visit: 10/12/2023    Reason for visit: ILD/polymyositis 6 month follow up, high risk med    HPI:    4/12/23  Angelica Jett is a 52 year old female with a history of  chronic hepatitis B (on Entrcavir), GERD, glucocorticoid induced osteoporosis, polymyositis with interstitial lung disease with irritable bowel syndrome who presents to rheumatology to establish care. [place] Patient was initially diagnosed with polymyositis and interstitial lung disease in 2005, after presenting with severe bilateral upper and lower extremity proximal muscle weakness. She reports first noticing shortness of breath after giving birth to her baby in 2003 with weakness noted in 2004 after she noticed she could no longer lift her son. She did not have new rashes or changes in mechanics of hands on presentation or later through out the disease course. She had undergone MRI muscles but has not had a muscle biopsy. She was initially under the care of Dr. Alan at [facility] in [place] and later at [facility]. Patient reports initially starting treatment with CellCept 1000 mg twice daily and prednisone 60 mg daily. Since diagnosis in 2005 she was able to taper down her prednisone successfully and was able to stop prednisone 1 mg in February 2023. She recalls one flare of polymyositis on prednisone taper in 2009 but didn't experience any additional flare on continued taper. Patient is currently doing well on Cellcept 1500 mg  Daily. Cellcept dose was decreased given stability in PFTs. She is not having any issues with muscle weakness. She doesn't feel shortness of breath on regular activity but experiences dyspnea on exertional activity. She sometimes uses supplemental oxygen on exertional activity. Patient was listed on transplant list but her condition stabilized with immunosuppressive therapy. Apparently  she has also been receiving pulmonary rehab with improvement in her symptoms. Patient has hx of chronic hepatitis B on Entecavir - she has negative Hep B e antigen with positive hep B e antibody and low titer HBV quant < 10/ ml. She has scheduled to follow up with hepatology. She has normal renal function. Patient has hx osteoporosis secondary to glucocorticoid use. She was previously on Reclast x 3 yrs but off since 2016. Patient reports intermittent hx of paresthesias in her feet. She also had diagnosis of pulmonary hypertension in 2012 and required Adcirca and revatio that was apparently discontinued given improvement in pulmonary pressures on right heart cath in 2019.  She is tolerating Cellcept well. She also has hx of osteoporosis secondary to glucocorticoid us. She was previously on n Reclast for 3 years but has been off all therapy since about 2016. She continues to take calcium and vitamin D. Last DEXA ~5 yrs ago. She hasn't had repeat Dexa scan. She was previously working as . Denies family hx of autoimmune conditions, no smoking or drinking hx in past.     10/12/2023  Doing well on current dose cellcept. No new rashes, weakness, stiffness, or dyspnea. Her BRITTNEY and myositis panel was all negative. Possible pulmonary artery hypertension based on CT read 6 months ago.      Today 10/25/2024:    -stable, no flare    -no complaints except mild intermittent r foot numbness    Rest of ROS is negative except in HPI           Past Medical History  Reviewed, per HPI  Past Surgical History:   Procedure Laterality Date    BIOPSY  2005    COLONOSCOPY  05/28/2021     Social History     Socioeconomic History    Marital status:      Spouse name: Not on file    Number of children: Not on file    Years of education: Not on file    Highest education level: Not on file   Occupational History    Not on file   Tobacco Use    Smoking status: Never     Passive exposure: Never    Smokeless tobacco: Never    Vaping Use    Vaping status: Never Used   Substance and Sexual Activity    Alcohol use: Never    Drug use: Never    Sexual activity: Yes     Partners: Male     Birth control/protection: None   Other Topics Concern    Parent/sibling w/ CABG, MI or angioplasty before 65F 55M? No   Social History Narrative    Not on file     Social Drivers of Health     Financial Resource Strain: Low Risk  (8/7/2024)    Financial Resource Strain     Within the past 12 months, have you or your family members you live with been unable to get utilities (heat, electricity) when it was really needed?: No   Food Insecurity: Low Risk  (8/7/2024)    Food Insecurity     Within the past 12 months, did you worry that your food would run out before you got money to buy more?: No     Within the past 12 months, did the food you bought just not last and you didn t have money to get more?: No   Transportation Needs: Low Risk  (8/7/2024)    Transportation Needs     Within the past 12 months, has lack of transportation kept you from medical appointments, getting your medicines, non-medical meetings or appointments, work, or from getting things that you need?: No   Physical Activity: Unknown (8/7/2024)    Exercise Vital Sign     Days of Exercise per Week: 5 days     Minutes of Exercise per Session: Not on file   Stress: No Stress Concern Present (8/7/2024)    [employer] - Occupational Stress Questionnaire     Feeling of Stress : Not at all   Social Connections: Unknown (8/7/2024)    Social Connection and Isolation Panel [NHANES]     Frequency of Communication with Friends and Family: Not on file     Frequency of Social Gatherings with Friends and Family: Once a week     Attends Zoroastrianism Services: Not on file     Active Member of Clubs or Organizations: Not on file     Attends Club or Organization Meetings: Not on file     Marital Status: Not on file   Interpersonal Safety: Low Risk  (8/7/2024)    Interpersonal Safety      Do you feel physically and emotionally safe where you currently live?: Yes     Within the past 12 months, have you been hit, slapped, kicked or otherwise physically hurt by someone?: No     Within the past 12 months, have you been humiliated or emotionally abused in other ways by your partner or ex-partner?: No   Housing Stability: Low Risk  (8/7/2024)    Housing Stability     Do you have housing? : Yes     Are you worried about losing your housing?: No     FHx: Reviewed, per HPI    Medications:  Current Outpatient Medications   Medication Sig Dispense Refill    Calcium Citrate-Vitamin D3 315-6.25 MG-MCG TABS Take 2 tablets by mouth      clobetasol (TEMOVATE) 0.05 % external ointment Apply topically 2 times daily 60 g 0    entecavir (BARACLUDE) 0.5 MG tablet TAKE ONE TABLET BY MOUTH ONCE DAILY 90 tablet 1    ibuprofen (ADVIL/MOTRIN) 600 MG tablet Take 1 tablet by mouth 4 times daily as needed (Patient not taking: Reported on 9/12/2024)      mycophenolate (GENERIC EQUIVALENT) 500 MG tablet TAKE 2 TABLETS BY MOUTH EVERY MORNING, THEN 1 TABLET EVERY NIGHT (TOTAL 3 TABS A DAY) (Patient not taking: Reported on 9/12/2024) 270 tablet 1    mycophenolate (GENERIC EQUIVALENT) 500 MG tablet Take 2 tablets (1,000 mg) by mouth 2 times daily 120 tablet 5    nintedanib (OFEV) 150 MG capsule Take 1 capsule (150 mg) by mouth 2 times daily 60 capsule 11    Resorcinol POWD Resorcinol 15% in vanicream twice day as needed for flares (Patient not taking: Reported on 9/12/2024) 30 g 11    sulfamethoxazole-trimethoprim (BACTRIM DS) 800-160 MG tablet Take 1 tablet by mouth 2 times daily. 180 tablet 0    sulfamethoxazole-trimethoprim (BACTRIM DS) 800-160 MG tablet TAKE 1 TABLET BY MOUTH TWICE A DAY (Patient not taking: Reported on 9/12/2024) 180 tablet 3    Vitamin D3 (VITAMIN D, CHOLECALCIFEROL,) 25 mcg (1000 units) tablet Take by mouth daily       No current facility-administered medications for this visit.       Allergies:     Allergies  "  Allergen Reactions    Iodine Hives, Rash and Other (See Comments)     Patient reports this allergy is because she is allergic to shrimp      Shellfish-Derived Products Difficulty breathing and Hives     Shrimp      Tetanus Toxoids        /81   Pulse 78   Temp 97.4  F (36.3  C)   Ht 1.676 m (5' 6\")   Wt 76.2 kg (168 lb)   SpO2 96%   BMI 27.12 kg/m       Physical Exam:  Constitutional: NAD, pleasant  Skin: no rash  Eyes: nl EOM, vision, conjunctiva, sclera  HEENT: no oral ulcers, nl salivary pool  Resp: fine crackles bilaterally, breathing comfortably on room air  CV: RRR, no murmurs, rubs or gallops, no edema  Abdomen: soft, NT  MSK: no synovitis or joint tenderness  Lymph: no cervical, supraclavicular, or epitrochlear nodes  Neuro: nl cranial nerves, strength  Psych: nl affect      Lab/Imaging: Reviewed recent labs and imaging.     Assessment/Plan:     1. ILD and polymyositis:    Angelica Jett is a 52 year old female with history with hx of polymyositis with ILD presents to establish care woth rheumatology at [facility]. The patient recently moved from [place] where she was following rheumatologist Dr. Trupti Ruiz at the the [facility]. She was diagnosed with ILD (UIP pattern based on lung biopsy) and polymyositis in 2005. She is currently on Cellcept 1500 mg once daily - Cellcept was decreased to this given stability in her pulmonary function.She has stopped taking Prednisone since February 2023. Patient has marked improvement in DLCO- recently followed up with pulmonary at [facility]. She is not requiring oxygen with her regular activities - this improvement could be due to both Cellcept and pulmonary rehab. She doesn't have any evidence of muscle weakness on our assessment. I couldn't find myositis serology in her charts - perhaps those were done at her initial diagnosis and we don't have access to her very old charts. Myositis panel and BRITTNEY negative. Plan to continue current dose of Cellcept as " per pulmonary needs since she doesn't have any evidence of myositis other rheumatologic manifestation.      -Discuss Echo and cardiology follow up with pulmonologist   -Labs today and every 6 months  -Follow up in person in 1 year    2. Chronic Hepatitis B on Entecavir - patient has referral to see hepatology     3. Hx of secondary PAH- previously on Revatio and Adcirc - Discontinued in 2019 based on improvement in her right heart cath in [place]     4. Osteoporosis secondary to steroid use. Now off of steroids. Continue Ca and vit D.      Today 10/25/2024: stable ILD on MMF+ofev, stable myositis. Stable labs. No concern for pulm HTN. I advised her to let me know if R foot numbness becomes persistent, to refer to neurology.    Plan:    Labs per pulmonary    Return in a year in person      The longitudinal plan of care for the diagnosis(es)/condition(s) as documented were addressed during this visit. Due to the added complexity in care, I will continue to support Angelica in the subsequent management and with ongoing continuity of care.      Kimberley Ferrer MD

## 2024-10-25 NOTE — LETTER
10/25/2024       RE: Angelica Jett  [address] [place] [address]     Dear Colleague,    Thank you for referring your patient, Angelica Jett, to the [facility] RHEUMATOLOGY CLINIC [place] [facility]. Please see a copy of my visit note below.    Rheumatology Clinic Follow Up  Date of Service 10/25/2024  Last Visit: 10/12/2023    Reason for visit: ILD/polymyositis 6 month follow up, high risk med    HPI:    4/12/23  Angelica Jett is a 52 year old female with a history of  chronic hepatitis B (on Entrcavir), GERD, glucocorticoid induced osteoporosis, polymyositis with interstitial lung disease with irritable bowel syndrome who presents to rheumatology to establish care. [place]. Patient was initially diagnosed with polymyositis and interstitial lung disease in 2005, after presenting with severe bilateral upper and lower extremity proximal muscle weakness. She reports first noticing shortness of breath after giving birth to her baby in 2003 with weakness noted in 2004 after she noticed she could no longer lift her son. She did not have new rashes or changes in mechanics of hands on presentation or later through out the disease course. She had undergone MRI muscles but has not had a muscle biopsy. She was initially under the care of Dr. Alan at [facility] in [place] and later at [facility]. Patient reports initially starting treatment with CellCept 1000 mg twice daily and prednisone 60 mg daily. Since diagnosis in 2005 she was able to taper down her prednisone successfully and was able to stop prednisone 1 mg in February 2023. She recalls one flare of polymyositis on prednisone taper in 2009 but didn't experience any additional flare on continued taper. Patient is currently doing well on Cellcept 1500 mg  Daily. Cellcept dose was decreased given stability in PFTs. She is not  having any issues with muscle weakness. She doesn't feel shortness of breath on regular activity but experiences dyspnea on exertional activity. She sometimes uses supplemental oxygen on exertional activity. Patient was listed on transplant list but her condition stabilized with immunosuppressive therapy. Apparently she has also been receiving pulmonary rehab with improvement in her symptoms. Patient has hx of chronic hepatitis B on Entecavir - she has negative Hep B e antigen with positive hep B e antibody and low titer HBV quant < 10/ ml. She has scheduled to follow up with hepatology. She has normal renal function. Patient has hx osteoporosis secondary to glucocorticoid use. She was previously on Reclast x 3 yrs but off since 2016. Patient reports intermittent hx of paresthesias in her feet. She also had diagnosis of pulmonary hypertension in 2012 and required Adcirca and revatio that was apparently discontinued given improvement in pulmonary pressures on right heart cath in 2019.  She is tolerating Cellcept well. She also has hx of osteoporosis secondary to glucocorticoid us. She was previously on n Reclast for 3 years but has been off all therapy since about 2016. She continues to take calcium and vitamin D. Last DEXA ~5 yrs ago. She hasn't had repeat Dexa scan. She was previously working as . Denies family hx of autoimmune conditions, no smoking or drinking hx in past.     10/12/2023  Doing well on current dose cellcept. No new rashes, weakness, stiffness, or dyspnea. Her BRITTNEY and myositis panel was all negative. Possible pulmonary artery hypertension based on CT read 6 months ago.      Today 10/25/2024:    -stable, no flare    -no complaints except mild intermittent r foot numbness    Rest of ROS is negative except in HPI           Past Medical History  Reviewed, per HPI  Past Surgical History:   Procedure Laterality Date     BIOPSY  2005     COLONOSCOPY  05/28/2021     Social History      Socioeconomic History     Marital status:      Spouse name: Not on file     Number of children: Not on file     Years of education: Not on file     Highest education level: Not on file   Occupational History     Not on file   Tobacco Use     Smoking status: Never     Passive exposure: Never     Smokeless tobacco: Never   Vaping Use     Vaping status: Never Used   Substance and Sexual Activity     Alcohol use: Never     Drug use: Never     Sexual activity: Yes     Partners: Male     Birth control/protection: None   Other Topics Concern     Parent/sibling w/ CABG, MI or angioplasty before 65F 55M? No   Social History Narrative     Not on file     Social Drivers of Health     Financial Resource Strain: Low Risk  (8/7/2024)    Financial Resource Strain      Within the past 12 months, have you or your family members you live with been unable to get utilities (heat, electricity) when it was really needed?: No   Food Insecurity: Low Risk  (8/7/2024)    Food Insecurity      Within the past 12 months, did you worry that your food would run out before you got money to buy more?: No      Within the past 12 months, did the food you bought just not last and you didn t have money to get more?: No   Transportation Needs: Low Risk  (8/7/2024)    Transportation Needs      Within the past 12 months, has lack of transportation kept you from medical appointments, getting your medicines, non-medical meetings or appointments, work, or from getting things that you need?: No   Physical Activity: Unknown (8/7/2024)    Exercise Vital Sign      Days of Exercise per Week: 5 days      Minutes of Exercise per Session: Not on file   Stress: No Stress Concern Present (8/7/2024)    [employer] [employer] - Occupational Stress Questionnaire      Feeling of Stress : Not at all   Social Connections: Unknown (8/7/2024)    Social Connection and Isolation Panel [NHANES]      Frequency of Communication with Friends and Family:  Not on file      Frequency of Social Gatherings with Friends and Family: Once a week      Attends Congregational Services: Not on file      Active Member of Clubs or Organizations: Not on file      Attends Club or Organization Meetings: Not on file      Marital Status: Not on file   Interpersonal Safety: Low Risk  (8/7/2024)    Interpersonal Safety      Do you feel physically and emotionally safe where you currently live?: Yes      Within the past 12 months, have you been hit, slapped, kicked or otherwise physically hurt by someone?: No      Within the past 12 months, have you been humiliated or emotionally abused in other ways by your partner or ex-partner?: No   Housing Stability: Low Risk  (8/7/2024)    Housing Stability      Do you have housing? : Yes      Are you worried about losing your housing?: No     FHx: Reviewed, per HPI    Medications:  Current Outpatient Medications   Medication Sig Dispense Refill     Calcium Citrate-Vitamin D3 315-6.25 MG-MCG TABS Take 2 tablets by mouth       clobetasol (TEMOVATE) 0.05 % external ointment Apply topically 2 times daily 60 g 0     entecavir (BARACLUDE) 0.5 MG tablet TAKE ONE TABLET BY MOUTH ONCE DAILY 90 tablet 1     ibuprofen (ADVIL/MOTRIN) 600 MG tablet Take 1 tablet by mouth 4 times daily as needed (Patient not taking: Reported on 9/12/2024)       mycophenolate (GENERIC EQUIVALENT) 500 MG tablet TAKE 2 TABLETS BY MOUTH EVERY MORNING, THEN 1 TABLET EVERY NIGHT (TOTAL 3 TABS A DAY) (Patient not taking: Reported on 9/12/2024) 270 tablet 1     mycophenolate (GENERIC EQUIVALENT) 500 MG tablet Take 2 tablets (1,000 mg) by mouth 2 times daily 120 tablet 5     nintedanib (OFEV) 150 MG capsule Take 1 capsule (150 mg) by mouth 2 times daily 60 capsule 11     Resorcinol POWD Resorcinol 15% in vanicream twice day as needed for flares (Patient not taking: Reported on 9/12/2024) 30 g 11     sulfamethoxazole-trimethoprim (BACTRIM DS) 800-160 MG tablet Take 1 tablet by mouth 2 times  "daily. 180 tablet 0     sulfamethoxazole-trimethoprim (BACTRIM DS) 800-160 MG tablet TAKE 1 TABLET BY MOUTH TWICE A DAY (Patient not taking: Reported on 9/12/2024) 180 tablet 3     Vitamin D3 (VITAMIN D, CHOLECALCIFEROL,) 25 mcg (1000 units) tablet Take by mouth daily       No current facility-administered medications for this visit.       Allergies:     Allergies   Allergen Reactions     Iodine Hives, Rash and Other (See Comments)     Patient reports this allergy is because she is allergic to shrimp       Shellfish-Derived Products Difficulty breathing and Hives     Shrimp       Tetanus Toxoids        /81   Pulse 78   Temp 97.4  F (36.3  C)   Ht 1.676 m (5' 6\")   Wt 76.2 kg (168 lb)   SpO2 96%   BMI 27.12 kg/m       Physical Exam:  Constitutional: NAD, pleasant  Skin: no rash  Eyes: nl EOM, vision, conjunctiva, sclera  HEENT: no oral ulcers, nl salivary pool  Resp: fine crackles bilaterally, breathing comfortably on room air  CV: RRR, no murmurs, rubs or gallops, no edema  Abdomen: soft, NT  MSK: no synovitis or joint tenderness  Lymph: no cervical, supraclavicular, or epitrochlear nodes  Neuro: nl cranial nerves, strength  Psych: nl affect      Lab/Imaging: Reviewed recent labs and imaging.     Assessment/Plan:     1. ILD and polymyositis:    Angelica Jett is a 52 year old female with history with hx of polymyositis with ILD presents to establish care woth rheumatology at [facility]. The patient recently moved from [place] where she was following rheumatologist Dr. Trupti Ruiz at the the [facility]. She was diagnosed with ILD (UIP pattern based on lung biopsy) and polymyositis in 2005. She is currently on Cellcept 1500 mg once daily - Cellcept was decreased to this given stability in her pulmonary function.She has stopped taking Prednisone since February 2023. Patient has marked improvement in DLCO- recently followed up with pulmonary at [facility]. She is not requiring oxygen with her regular " activities - this improvement could be due to both Cellcept and pulmonary rehab. She doesn't have any evidence of muscle weakness on our assessment. I couldn't find myositis serology in her charts - perhaps those were done at her initial diagnosis and we don't have access to her very old charts. Myositis panel and BRITTNEY negative. Plan to continue current dose of Cellcept as per pulmonary needs since she doesn't have any evidence of myositis other rheumatologic manifestation.      -Discuss Echo and cardiology follow up with pulmonologist   -Labs today and every 6 months  -Follow up in person in 1 year    2. Chronic Hepatitis B on Entecavir - patient has referral to see hepatology     3. Hx of secondary PAH- previously on Revatio and Adcirc - Discontinued in 2019 based on improvement in her right heart cath in [place]     4. Osteoporosis secondary to steroid use. Now off of steroids. Continue Ca and vit D.      Today 10/25/2024: stable ILD on MMF+ofev, stable myositis. Stable labs. No concern for pulm HTN. I advised her to let me know if R foot numbness becomes persistent, to refer to neurology.    Plan:    Labs per pulmonary    Return in a year in person      The longitudinal plan of care for the diagnosis(es)/condition(s) as documented were addressed during this visit. Due to the added complexity in care, I will continue to support Angelica in the subsequent management and with ongoing continuity of care.      Kimberley Ferrer MD      Again, thank you for allowing me to participate in the care of your patient.      Sincerely,    Kimberley Ferrer MD

## 2024-11-10 DIAGNOSIS — J84.9 ILD (INTERSTITIAL LUNG DISEASE) (H): ICD-10-CM

## 2024-11-10 DIAGNOSIS — I27.29 OTHER SECONDARY PULMONARY HYPERTENSION (H): ICD-10-CM

## 2024-11-11 ENCOUNTER — ANCILLARY PROCEDURE (OUTPATIENT)
Dept: ULTRASOUND IMAGING | Facility: CLINIC | Age: 54
End: 2024-11-11
Attending: PHYSICIAN ASSISTANT
Payer: COMMERCIAL

## 2024-11-11 ENCOUNTER — LAB (OUTPATIENT)
Dept: LAB | Facility: CLINIC | Age: 54
End: 2024-11-11
Payer: COMMERCIAL

## 2024-11-11 DIAGNOSIS — J84.9 ILD (INTERSTITIAL LUNG DISEASE) (H): ICD-10-CM

## 2024-11-11 DIAGNOSIS — B18.1 CHRONIC VIRAL HEPATITIS B WITHOUT DELTA AGENT AND WITHOUT COMA (H): ICD-10-CM

## 2024-11-11 DIAGNOSIS — B18.1 CHRONIC VIRAL HEPATITIS B WITHOUT DELTA AGENT AND WITHOUT COMA (H): Primary | ICD-10-CM

## 2024-11-11 LAB
ALBUMIN SERPL BCG-MCNC: 3.9 G/DL (ref 3.5–5.2)
ALP SERPL-CCNC: 70 U/L (ref 40–150)
ALT SERPL W P-5'-P-CCNC: 16 U/L (ref 0–50)
ANION GAP SERPL CALCULATED.3IONS-SCNC: 9 MMOL/L (ref 7–15)
AST SERPL W P-5'-P-CCNC: 28 U/L (ref 0–45)
BASOPHILS # BLD AUTO: 0 10E3/UL (ref 0–0.2)
BASOPHILS NFR BLD AUTO: 1 %
BILIRUB DIRECT SERPL-MCNC: <0.2 MG/DL (ref 0–0.3)
BILIRUB SERPL-MCNC: 0.2 MG/DL
BUN SERPL-MCNC: 12 MG/DL (ref 6–20)
CALCIUM SERPL-MCNC: 9.2 MG/DL (ref 8.8–10.4)
CHLORIDE SERPL-SCNC: 107 MMOL/L (ref 98–107)
CREAT SERPL-MCNC: 0.99 MG/DL (ref 0.51–0.95)
EGFRCR SERPLBLD CKD-EPI 2021: 67 ML/MIN/1.73M2
EOSINOPHIL # BLD AUTO: 0.2 10E3/UL (ref 0–0.7)
EOSINOPHIL NFR BLD AUTO: 3 %
ERYTHROCYTE [DISTWIDTH] IN BLOOD BY AUTOMATED COUNT: 14.3 % (ref 10–15)
GLUCOSE SERPL-MCNC: 95 MG/DL (ref 70–99)
HCO3 SERPL-SCNC: 25 MMOL/L (ref 22–29)
HCT VFR BLD AUTO: 42.6 % (ref 35–47)
HGB BLD-MCNC: 13 G/DL (ref 11.7–15.7)
IMM GRANULOCYTES # BLD: 0 10E3/UL
IMM GRANULOCYTES NFR BLD: 0 %
LYMPHOCYTES # BLD AUTO: 2.4 10E3/UL (ref 0.8–5.3)
LYMPHOCYTES NFR BLD AUTO: 40 %
MCH RBC QN AUTO: 24.9 PG (ref 26.5–33)
MCHC RBC AUTO-ENTMCNC: 30.5 G/DL (ref 31.5–36.5)
MCV RBC AUTO: 82 FL (ref 78–100)
MONOCYTES # BLD AUTO: 0.6 10E3/UL (ref 0–1.3)
MONOCYTES NFR BLD AUTO: 10 %
NEUTROPHILS # BLD AUTO: 2.8 10E3/UL (ref 1.6–8.3)
NEUTROPHILS NFR BLD AUTO: 47 %
PLATELET # BLD AUTO: 181 10E3/UL (ref 150–450)
POTASSIUM SERPL-SCNC: 4.5 MMOL/L (ref 3.4–5.3)
PROT SERPL-MCNC: 7.3 G/DL (ref 6.4–8.3)
RBC # BLD AUTO: 5.22 10E6/UL (ref 3.8–5.2)
SODIUM SERPL-SCNC: 141 MMOL/L (ref 135–145)
WBC # BLD AUTO: 6 10E3/UL (ref 4–11)

## 2024-11-11 PROCEDURE — 76705 ECHO EXAM OF ABDOMEN: CPT | Mod: TC | Performed by: INTERNAL MEDICINE

## 2024-11-11 PROCEDURE — 85025 COMPLETE CBC W/AUTO DIFF WBC: CPT

## 2024-11-11 PROCEDURE — 82248 BILIRUBIN DIRECT: CPT

## 2024-11-11 PROCEDURE — 80053 COMPREHEN METABOLIC PANEL: CPT

## 2024-11-11 PROCEDURE — 36415 COLL VENOUS BLD VENIPUNCTURE: CPT

## 2024-11-11 RX ORDER — MYCOPHENOLATE MOFETIL 500 MG/1
1000 TABLET ORAL 2 TIMES DAILY
Qty: 360 TABLET | Refills: 3 | Status: SHIPPED | OUTPATIENT
Start: 2024-11-11

## 2024-11-24 NOTE — PROGRESS NOTES
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 . Transaminases are normal and liver function also normal. FibroScan in 2021: showing Stage 0-1 fibrosis, 6.6 kilopascals     # Chronic hepatitis B on Entecavir, suppressed:   - Continue Entecavir 0.5 mg daily   - Will continue to monitor renal function with current anti-viral treatment and adjust as needed    # HCC screening, up to date:   - US abdomen limited in six months and in one year     # Bloated:  - Trial of miralax 1/2 cap to 1 capful daily to see if more regular bowel movements helps.   - Monitor any trends with current diet.   - CRC screening: colonoscopy due in 2026     # Follow-up in clinic in one year     Justina Dinh PA-C   [facility] Hepatology clinic    Total time for E/M services performed on the date of the encounter 30 minutes.  This included review of previous: clinic visits, hospital records, lab results, imaging studies, and procedural documentation. Time also includes patient visit, documentation and discussion with other providers.  The findings from this review are summarized in the above note.     -----------------------------------------------------       HPI:   Angelica Jett is a 54 year old female who presents to clinic today for the following health issues:     Hepatitis B  E antigen negative (11/2021)   -Diagnose: 2005   -History:  likely early childhood transmission   -Prior biopsy:  Diagnosis in 2005 at [facility] (Dr. Yen Knox). Liver biopsy in 2005 demonstrated severe activity with moderate fibrosis.   FibroScan 2021 at [facility] showing Stage 0-1, 6.6 kilopascals.   -Prior treatments:  Adefovir 2005 - ?2012  Entecavir started 2014    Patient was last seen by me on 11/17/2023. No recent  "hospitalizations or ER visits. No new medications.     Appetite is good. Weight relatively stable.     Feels bloated often. Having bowel movements typically daily, but sometimes may go every other day. Felt better after colonoscopy.     Patient denies jaundice, lower extremity edema, abdominal distension or confusion.  Patient also denies melena, hematochezia or hematemesis.  Patient denies weight loss, fevers, sweats or chills.    Hepatitis B surface antigen: not available for review  Hepatitis B positive core antibody: Reactive 21  Hepatitis B surface antibody level: <10.00 21  Hepatitis B DNA Quant: Not detected 11/10/23, not detected 23  Hepatitis B/E Antigen: Negative 21    CRC screenin,     Hepatitis B:   Lab Results   Component Value Date    HCVAB Nonreactive 2023     Lab Results   Component Value Date    HBQRES Not Detected 10/11/2024    HBQRES <10 (A) 2024    HBQRES Not Detected 11/10/2023    HBQRES Not Detected 2023     Recent Labs   Lab Test 24  0742 10/11/24  0754 24  0951 24  1419 05/10/24  0937 24  1018 11/10/23  1017 10/12/23  1038 23  1216 23  0738 23  0950 23  1002   ALKPHOS 70 75 74 73  --  79 76  --  93 91 97 86   ALT 16 22 20 18 13 16 16 20 23 19 19 17   AST 28 30 27 31 26 25 28 25 31 25 26 37*        Medical hx Surgical hx   Past Medical History:   Diagnosis Date    Diastolic dysfunction 2014    Past Surgical History:   Procedure Laterality Date    BIOPSY      COLONOSCOPY  2021               Physical Exam:   /74   Pulse 73   Ht 1.676 m (5' 6\")   Wt 76.7 kg (169 lb)   BMI 27.28 kg/m      Gen- well, NAD, A+Ox3, normal color  Lym- no palpable LAD  Abd- soft, nontender, mildly distended . No organomegaly   Extr- hands normal, no YANIRA  Skin- no rash or jaundice  Neuro- no asterixis  Psych- normal mood         Data:   Reviewed in person and significant for:    Lab Results " "  Component Value Date     11/11/2024      Lab Results   Component Value Date    POTASSIUM 4.5 11/11/2024     Lab Results   Component Value Date    CHLORIDE 107 11/11/2024     Lab Results   Component Value Date    CO2 25 11/11/2024     Lab Results   Component Value Date    BUN 12.0 11/11/2024     Lab Results   Component Value Date    CR 0.99 11/11/2024       Lab Results   Component Value Date    WBC 6.0 11/11/2024     Lab Results   Component Value Date    HGB 13.0 11/11/2024     Lab Results   Component Value Date    HCT 42.6 11/11/2024     Lab Results   Component Value Date    MCV 82 11/11/2024     Lab Results   Component Value Date     11/11/2024       Lab Results   Component Value Date    AST 28 11/11/2024     Lab Results   Component Value Date    ALT 16 11/11/2024     No results found for: \"BILICONJ\"   Lab Results   Component Value Date    BILITOTAL 0.2 11/11/2024       Lab Results   Component Value Date    ALBUMIN 3.9 11/11/2024     Lab Results   Component Value Date    PROTTOTAL 7.3 11/11/2024      Lab Results   Component Value Date    ALKPHOS 70 11/11/2024       Lab Results   Component Value Date    INR 1.05 11/10/2023     US ABDOMEN LIMITED 11/11/2024 8:08 AM     CLINICAL HISTORY: Chronic viral hepatitis B without delta agent and  without coma (H)  TECHNIQUE: Limited abdominal ultrasound.     COMPARISON: 5/17/2024.     FINDINGS:     GALLBLADDER: The gallbladder is normal. No gallstones, wall  thickening, or pericholecystic fluid. Negative sonographic Garland's  sign.     BILE DUCTS: There is no biliary dilatation. The common duct measures 5  mm.     LIVER: Coarsened hepatic echotexture. No focal liver lesion  identified.       RIGHT KIDNEY: No hydronephrosis.     PANCREAS: The visualized portions of the pancreas are normal.  Partially obscured by bowel gas.     No ascites.                                                                      IMPRESSION:  1.  Coarsening of the hepatic echotexture " compatible with chronic  hepatocellular disease.  2.  No focal liver lesion identified.

## 2024-11-25 ENCOUNTER — OFFICE VISIT (OUTPATIENT)
Dept: GASTROENTEROLOGY | Facility: CLINIC | Age: 54
End: 2024-11-25
Attending: PHYSICIAN ASSISTANT
Payer: COMMERCIAL

## 2024-11-25 VITALS
WEIGHT: 169 LBS | HEART RATE: 73 BPM | HEIGHT: 66 IN | BODY MASS INDEX: 27.16 KG/M2 | DIASTOLIC BLOOD PRESSURE: 74 MMHG | SYSTOLIC BLOOD PRESSURE: 118 MMHG

## 2024-11-25 DIAGNOSIS — B18.1 CHRONIC VIRAL HEPATITIS B WITHOUT DELTA AGENT AND WITHOUT COMA (H): ICD-10-CM

## 2024-11-25 DIAGNOSIS — D84.9 IMMUNOSUPPRESSED STATUS (H): Primary | ICD-10-CM

## 2024-11-25 PROCEDURE — 99213 OFFICE O/P EST LOW 20 MIN: CPT | Performed by: PHYSICIAN ASSISTANT

## 2024-11-25 PROCEDURE — 99214 OFFICE O/P EST MOD 30 MIN: CPT | Performed by: PHYSICIAN ASSISTANT

## 2024-11-25 ASSESSMENT — PAIN SCALES - GENERAL: PAINLEVEL_OUTOF10: NO PAIN (0)

## 2024-11-25 NOTE — LETTER
11/25/2024      Angelica Jett  [address] [place] [address]      Dear Colleague,    Thank you for referring your patient, Angelica Jett, to the [facility] HEPATOLOGY CLINIC [place]. Please see a copy of my visit note below.    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 . Transaminases are normal and liver function also normal. FibroScan in 2021: showing Stage 0-1 fibrosis, 6.6 kilopascals     # Chronic hepatitis B on Entecavir, suppressed:   - Continue Entecavir 0.5 mg daily   - Will continue to monitor renal function with current anti-viral treatment and adjust as needed    # HCC screening, up to date:   - US abdomen limited in six months and in one year     # Bloated:  - Trial of miralax 1/2 cap to 1 capful daily to see if more regular bowel movements helps.   - Monitor any trends with current diet.   - CRC screening: colonoscopy due in 2026     # Follow-up in clinic in one year     Justina Dinh PA-C   [facility] Hepatology clinic    Total time for E/M services performed on the date of the encounter 30 minutes.  This included review of previous: clinic visits, hospital records, lab results, imaging studies, and procedural documentation. Time also includes patient visit, documentation and discussion with other providers.  The findings from this review are summarized in the above note.     -----------------------------------------------------       HPI:   Angelica Jett is a 54 year old female who presents to clinic today for the following health issues:     Hepatitis B  E antigen negative (11/2021)   -Diagnose: 2005   -History:  likely early childhood transmission   -Prior biopsy:  Diagnosis in 2005 at [facility] (Dr. Yen Knox). Liver biopsy in 2005  "demonstrated severe activity with moderate fibrosis.   FibroScan  at [facility] [place] [facility] showing Stage 0-1, 6.6 kilopascals.   -Prior treatments:  Adefovir 2005 - ?  Entecavir started     Patient was last seen by me on 2023. No recent hospitalizations or ER visits. No new medications.     Appetite is good. Weight relatively stable.     Feels bloated often. Having bowel movements typically daily, but sometimes may go every other day. Felt better after colonoscopy.     Patient denies jaundice, lower extremity edema, abdominal distension or confusion.  Patient also denies melena, hematochezia or hematemesis.  Patient denies weight loss, fevers, sweats or chills.    Hepatitis B surface antigen: not available for review  Hepatitis B positive core antibody: Reactive 21  Hepatitis B surface antibody level: <10.00 21  Hepatitis B DNA Quant: Not detected 11/10/23, not detected 23  Hepatitis B/E Antigen: Negative 21    CRC screenin,     Hepatitis B:   Lab Results   Component Value Date    HCVAB Nonreactive 2023     Lab Results   Component Value Date    HBQRES Not Detected 10/11/2024    HBQRES <10 (A) 2024    HBQRES Not Detected 11/10/2023    HBQRES Not Detected 2023     Recent Labs   Lab Test 24  0742 10/11/24  0754 24  0951 24  1419 05/10/24  0937 24  1018 11/10/23  1017 10/12/23  1038 23  1216 23  0738 23  0950 23  1002   ALKPHOS 70 75 74 73  --  79 76  --  93 91 97 86   ALT 16 22 20 18 13 16 16 20 23 19 19 17   AST 28 30 27 31 26 25 28 25 31 25 26 37*        Medical hx Surgical hx   Past Medical History:   Diagnosis Date     Diastolic dysfunction 2014    Past Surgical History:   Procedure Laterality Date     BIOPSY       COLONOSCOPY  2021               Physical Exam:   /74   Pulse 73   Ht 1.676 m (5' 6\")   Wt 76.7 kg (169 lb)   BMI 27.28 kg/m      Gen- well, NAD, A+Ox3, normal color  Lym- no " "palpable LAD  Abd- soft, nontender, mildly distended . No organomegaly   Extr- hands normal, no YANIRA  Skin- no rash or jaundice  Neuro- no asterixis  Psych- normal mood         Data:   Reviewed in person and significant for:    Lab Results   Component Value Date     11/11/2024      Lab Results   Component Value Date    POTASSIUM 4.5 11/11/2024     Lab Results   Component Value Date    CHLORIDE 107 11/11/2024     Lab Results   Component Value Date    CO2 25 11/11/2024     Lab Results   Component Value Date    BUN 12.0 11/11/2024     Lab Results   Component Value Date    CR 0.99 11/11/2024       Lab Results   Component Value Date    WBC 6.0 11/11/2024     Lab Results   Component Value Date    HGB 13.0 11/11/2024     Lab Results   Component Value Date    HCT 42.6 11/11/2024     Lab Results   Component Value Date    MCV 82 11/11/2024     Lab Results   Component Value Date     11/11/2024       Lab Results   Component Value Date    AST 28 11/11/2024     Lab Results   Component Value Date    ALT 16 11/11/2024     No results found for: \"BILICONJ\"   Lab Results   Component Value Date    BILITOTAL 0.2 11/11/2024       Lab Results   Component Value Date    ALBUMIN 3.9 11/11/2024     Lab Results   Component Value Date    PROTTOTAL 7.3 11/11/2024      Lab Results   Component Value Date    ALKPHOS 70 11/11/2024       Lab Results   Component Value Date    INR 1.05 11/10/2023     US ABDOMEN LIMITED 11/11/2024 8:08 AM     CLINICAL HISTORY: Chronic viral hepatitis B without delta agent and  without coma (H)  TECHNIQUE: Limited abdominal ultrasound.     COMPARISON: 5/17/2024.     FINDINGS:     GALLBLADDER: The gallbladder is normal. No gallstones, wall  thickening, or pericholecystic fluid. Negative sonographic Garland's  sign.     BILE DUCTS: There is no biliary dilatation. The common duct measures 5  mm.     LIVER: Coarsened hepatic echotexture. No focal liver lesion  identified.       RIGHT KIDNEY: No hydronephrosis.   "   PANCREAS: The visualized portions of the pancreas are normal.  Partially obscured by bowel gas.     No ascites.                                                                      IMPRESSION:  1.  Coarsening of the hepatic echotexture compatible with chronic  hepatocellular disease.  2.  No focal liver lesion identified.       Again, thank you for allowing me to participate in the care of your patient.        Sincerely,        Justina Dinh PA-C

## 2024-11-25 NOTE — NURSING NOTE
"Chief Complaint   Patient presents with    RECHECK     1 year       /74   Pulse 73   Ht 1.676 m (5' 6\")   Wt 76.7 kg (169 lb)   BMI 27.28 kg/m    Nina Urbina MA on 11/25/2024 at 8:56 AM    "

## 2024-12-05 ENCOUNTER — OFFICE VISIT (OUTPATIENT)
Dept: DERMATOLOGY | Facility: CLINIC | Age: 54
End: 2024-12-05
Payer: COMMERCIAL

## 2024-12-05 VITALS — WEIGHT: 168.3 LBS | BODY MASS INDEX: 27.16 KG/M2

## 2024-12-05 DIAGNOSIS — L73.2 HIDRADENITIS SUPPURATIVA: Primary | ICD-10-CM

## 2024-12-05 NOTE — LETTER
12/5/2024       RE: Angelica Jett  [address]  [place] [address]     Dear Colleague,    Thank you for referring your patient, Angelica Jett, to the [facility] [place] at [facility]. Please see a copy of my visit note below.    [facility] Dermatology Note  Encounter Date: Dec 5, 2024  Office Visit       Dermatology Problem List:  1. Hidradenitis suppurativa with ulcerating/PG-like phenotype in left axilla  - Current Tx: Bactrim 800-160 BID ongoing, mycophenolate, clobetasol ointment, resorcinol, s/p ILK (12/5/2024, 9/7/23)  - Prior Tx: prednisone, doxycycline  - Future: rheumatology and pulmonology supportive of starting Humira, ERIK inhibitors     MHx: Interstitial lung disease. Chronic hepatitis B (taking Entecavir). Polymyositis 2005 (taking mycophenolate).    ____________________________________________    Assessment & Plan:  #  Hidradenitis suppurativa +/- PG vs HS with PG-phenotype.   Patient appears improved from last time draining tunnels, and each axilla are nearly closed. Regimen she is on seems to be progressing in the right direction.  Offered Kenalog injection to speed up healing and if no resolution/improvement will consider referral to derm surgery for excision. Humira has been discussed and approved by Pulm and Rheum. Will consider in future.  -Continue Bactrim 800-160 twice daily ongoing  -Continue clobetasol as needed for itching  -Has resorcinol at home if needed for flares. Caution hypopigmentation.  -Patient agreeable to injection today (see procedure note below)    PROCEDURE: Intralesional kenalog injection (R/L axilla)  Verbal consent obtained after discussion of risks/benefits, including but not limited to skin atrophy, hypopigmentation and bleeding. Area cleansed with isopropyl alcohol. A total of 1 mL of kenalog 20 injected into each the above location(s) - a total of 2 mL injected  overall. Minimal bleeding. Pt tolerated procedure well. Post-care provided.     The longitudinal plan of care for the diagnosis(es)/condition(s) as documented were addressed during this visit. Due to the added complexity in care, I will continue to support Angelica in the subsequent management and with ongoing continuity of care.    Follow-up: 12 weeks by phone    Staff and Scribe:     Scribe Disclosure:   I, Sloane Pryor, am serving as a scribe; to document services personally performed by Sanjiv Sotomayor MD -based on data collection and the provider's statements to me.     Candido Aguila PA-C  [facility] Dermatology    Provider Disclosure:  I agree with above History, Review of Systems, Physical exam and Plan.  I have reviewed the content of the documentation and have edited it as needed. I have personally performed the services documented here and the documentation accurately represents those services and the decisions I have made.      Electronically signed by:  Sanjiv Sotomayor MD, FAAD, FACP     Departments of Internal Medicine and Dermatology  [facility]      ____________________________________________    CC: Derm Problem (HS follow up.)    HPI:  Ms. Angelica Jett is a(n) 54 year old female who presents today as a return patient for HS.    Last seen in clinic 10/10/24, left axilla was worse than right.  We recommended she restart the Bactrim twice daily.  Additionally, she did a prednisone taper which only provided temporary relief.  Today, reports the left side is improving but the right side is unchanged.  She applies clobetasol every day with bandage changes, which also helps with itching.  Is not using the resorcinol.  Is not interested in steroid injections today.    Patient is otherwise feeling well, without additional skin concerns.    HS Nurse Assessment        9/12/2024     4:10 PM 10/10/2024     4:14 PM 12/5/2024     4:10 PM   Nurse Assessment Data    Over the past 30 days how many old lesions flared back up? 1 1 2   Over the past 30 days how many new lesions did you get? 0 1 0   Over the past week, how many dressing changes do you do each day? 0 3+ 1   Over the past week, has your wound drainage been: None Moderate Mild   Rate your HS overall from 0 - 10 (0 = no disease, 10 = worst) over the past week:  1 5 1   Rate your pain score from 0 - 10 (0 = no disease, 10 = worst) for the most painful/symptomatic lesion in the past week:  1 8 1   Over the past week, how much has HS influenced your quality of life? not at all slightly slightly     Physical Exam:  Vitals: Wt 76.3 kg (168 lb 4.8 oz)   BMI 27.16 kg/m    SKIN: Focused examination of bilateral axilla was performed.  - Bilateral axilla reveal 1 inflammatory nodule and 1 draining tunnel respectively       Medications:  Current Outpatient Medications   Medication Sig Dispense Refill     Calcium Citrate-Vitamin D3 315-6.25 MG-MCG TABS Take 2 tablets by mouth       clobetasol (TEMOVATE) 0.05 % external ointment Apply topically 2 times daily 60 g 0     entecavir (BARACLUDE) 0.5 MG tablet TAKE ONE TABLET BY MOUTH ONCE DAILY 90 tablet 1     mycophenolate (GENERIC EQUIVALENT) 500 MG tablet TAKE 2 TABLETS BY MOUTH 2 TIMES DAILY 360 tablet 3     nintedanib (OFEV) 150 MG capsule Take 1 capsule (150 mg) by mouth 2 times daily 60 capsule 11     sulfamethoxazole-trimethoprim (BACTRIM DS) 800-160 MG tablet Take 1 tablet by mouth 2 times daily. 180 tablet 0     Vitamin D3 (VITAMIN D, CHOLECALCIFEROL,) 25 mcg (1000 units) tablet Take by mouth daily       ibuprofen (ADVIL/MOTRIN) 600 MG tablet Take 1 tablet by mouth 4 times daily as needed (Patient not taking: Reported on 9/12/2024)       mycophenolate (GENERIC EQUIVALENT) 500 MG tablet TAKE 2 TABLETS BY MOUTH EVERY MORNING, THEN 1 TABLET EVERY NIGHT (TOTAL 3 TABS A DAY) (Patient not taking: Reported on 9/12/2024) 270 tablet 1     Resorcinol POWD Resorcinol 15% in vanicream  twice day as needed for flares (Patient not taking: Reported on 9/12/2024) 30 g 11     sulfamethoxazole-trimethoprim (BACTRIM DS) 800-160 MG tablet TAKE 1 TABLET BY MOUTH TWICE A DAY (Patient not taking: Reported on 9/12/2024) 180 tablet 3     No current facility-administered medications for this visit.      Past Medical History:   Patient Active Problem List   Diagnosis     Pulmonary hypertension (H)     Chronic viral hepatitis B without delta agent and without coma (H)     Interstitial lung disease (H)     Abscess     Hidradenitis axillaris     Benign tubular adenoma of large intestine     Diastolic dysfunction     GERD (gastroesophageal reflux disease)     OP (osteoporosis)     Other irritable bowel syndrome     Polymyositis (H)     Immunosuppressed status (H)     Screening for cervical cancer     Past Medical History:   Diagnosis Date     Diastolic dysfunction 2/24/2014            Again, thank you for allowing me to participate in the care of your patient.      Sincerely,    Sanjiv Sotomayor MD

## 2024-12-05 NOTE — PROGRESS NOTES
[facility] Dermatology Note  Encounter Date: Dec 5, 2024  Office Visit       Dermatology Problem List:  1. Hidradenitis suppurativa with ulcerating/PG-like phenotype in left axilla  - Current Tx: Bactrim 800-160 BID ongoing, mycophenolate, clobetasol ointment, resorcinol, s/p ILK (12/5/2024, 9/7/23)  - Prior Tx: prednisone, doxycycline  - Future: rheumatology and pulmonology supportive of starting Humira, ERIK inhibitors     MHx: Interstitial lung disease. Chronic hepatitis B (taking Entecavir). Polymyositis 2005 (taking mycophenolate).    ____________________________________________    Assessment & Plan:  #  Hidradenitis suppurativa +/- PG vs HS with PG-phenotype.   Patient appears improved from last time draining tunnels, and each axilla are nearly closed. Regimen she is on seems to be progressing in the right direction.  Offered Kenalog injection to speed up healing and if no resolution/improvement will consider referral to derm surgery for excision. Humira has been discussed and approved by Pulm and Rheum. Will consider in future.  -Continue Bactrim 800-160 twice daily ongoing  -Continue clobetasol as needed for itching  -Has resorcinol at home if needed for flares. Caution hypopigmentation.  -Patient agreeable to injection today (see procedure note below)    PROCEDURE: Intralesional kenalog injection (R/L axilla)  Verbal consent obtained after discussion of risks/benefits, including but not limited to skin atrophy, hypopigmentation and bleeding. Area cleansed with isopropyl alcohol. A total of 1 mL of kenalog 20 injected into each the above location(s) - a total of 2 mL injected overall. Minimal bleeding. Pt tolerated procedure well. Post-care provided.     The longitudinal plan of care for the diagnosis(es)/condition(s) as documented were addressed during this visit. Due to the added complexity in care, I will continue to support Angelica in the subsequent management and with ongoing  continuity of care.    Follow-up: 12 weeks by phone    Staff and Scribe:     Scribe Disclosure:   I, Sloane Pryor, am serving as a scribe; to document services personally performed by Sanjiv Sotomayor MD -based on data collection and the provider's statements to me.     Candido Aguila PA-C  [facility] Dermatology    Provider Disclosure:  I agree with above History, Review of Systems, Physical exam and Plan.  I have reviewed the content of the documentation and have edited it as needed. I have personally performed the services documented here and the documentation accurately represents those services and the decisions I have made.      Electronically signed by:  Sanjiv Sotomayor MD, FAAD, FACP     Departments of Internal Medicine and Dermatology  [facility]      ____________________________________________    CC: Derm Problem (HS follow up.)    HPI:  Ms. Angelica Jett is a(n) 54 year old female who presents today as a return patient for HS.    Last seen in clinic 10/10/24, left axilla was worse than right.  We recommended she restart the Bactrim twice daily.  Additionally, she did a prednisone taper which only provided temporary relief.  Today, reports the left side is improving but the right side is unchanged.  She applies clobetasol every day with bandage changes, which also helps with itching.  Is not using the resorcinol.  Is not interested in steroid injections today.    Patient is otherwise feeling well, without additional skin concerns.    HS Nurse Assessment        9/12/2024     4:10 PM 10/10/2024     4:14 PM 12/5/2024     4:10 PM   Nurse Assessment Data   Over the past 30 days how many old lesions flared back up? 1 1 2   Over the past 30 days how many new lesions did you get? 0 1 0   Over the past week, how many dressing changes do you do each day? 0 3+ 1   Over the past week, has your wound drainage been: None Moderate Mild   Rate your HS overall from 0 - 10 (0 =  no disease, 10 = worst) over the past week:  1 5 1   Rate your pain score from 0 - 10 (0 = no disease, 10 = worst) for the most painful/symptomatic lesion in the past week:  1 8 1   Over the past week, how much has HS influenced your quality of life? not at all slightly slightly     Physical Exam:  Vitals: Wt 76.3 kg (168 lb 4.8 oz)   BMI 27.16 kg/m    SKIN: Focused examination of bilateral axilla was performed.  - Bilateral axilla reveal 1 inflammatory nodule and 1 draining tunnel respectively       Medications:  Current Outpatient Medications   Medication Sig Dispense Refill    Calcium Citrate-Vitamin D3 315-6.25 MG-MCG TABS Take 2 tablets by mouth      clobetasol (TEMOVATE) 0.05 % external ointment Apply topically 2 times daily 60 g 0    entecavir (BARACLUDE) 0.5 MG tablet TAKE ONE TABLET BY MOUTH ONCE DAILY 90 tablet 1    mycophenolate (GENERIC EQUIVALENT) 500 MG tablet TAKE 2 TABLETS BY MOUTH 2 TIMES DAILY 360 tablet 3    nintedanib (OFEV) 150 MG capsule Take 1 capsule (150 mg) by mouth 2 times daily 60 capsule 11    sulfamethoxazole-trimethoprim (BACTRIM DS) 800-160 MG tablet Take 1 tablet by mouth 2 times daily. 180 tablet 0    Vitamin D3 (VITAMIN D, CHOLECALCIFEROL,) 25 mcg (1000 units) tablet Take by mouth daily      ibuprofen (ADVIL/MOTRIN) 600 MG tablet Take 1 tablet by mouth 4 times daily as needed (Patient not taking: Reported on 9/12/2024)      mycophenolate (GENERIC EQUIVALENT) 500 MG tablet TAKE 2 TABLETS BY MOUTH EVERY MORNING, THEN 1 TABLET EVERY NIGHT (TOTAL 3 TABS A DAY) (Patient not taking: Reported on 9/12/2024) 270 tablet 1    Resorcinol POWD Resorcinol 15% in vanicream twice day as needed for flares (Patient not taking: Reported on 9/12/2024) 30 g 11    sulfamethoxazole-trimethoprim (BACTRIM DS) 800-160 MG tablet TAKE 1 TABLET BY MOUTH TWICE A DAY (Patient not taking: Reported on 9/12/2024) 180 tablet 3     No current facility-administered medications for this visit.      Past Medical  History:   Patient Active Problem List   Diagnosis    Pulmonary hypertension (H)    Chronic viral hepatitis B without delta agent and without coma (H)    Interstitial lung disease (H)    Abscess    Hidradenitis axillaris    Benign tubular adenoma of large intestine    Diastolic dysfunction    GERD (gastroesophageal reflux disease)    OP (osteoporosis)    Other irritable bowel syndrome    Polymyositis (H)    Immunosuppressed status (H)    Screening for cervical cancer     Past Medical History:   Diagnosis Date    Diastolic dysfunction 2/24/2014

## 2024-12-06 RX ORDER — TRIAMCINOLONE ACETONIDE 40 MG/ML
20 INJECTION, SUSPENSION INTRA-ARTICULAR; INTRAMUSCULAR ONCE
Status: COMPLETED | OUTPATIENT
Start: 2024-12-06 | End: 2024-12-06

## 2024-12-06 RX ORDER — TRIAMCINOLONE ACETONIDE 40 MG/ML
20 INJECTION, SUSPENSION INTRA-ARTICULAR; INTRAMUSCULAR ONCE
Status: COMPLETED | OUTPATIENT
Start: 2024-12-05 | End: 2024-12-06

## 2024-12-06 RX ADMIN — TRIAMCINOLONE ACETONIDE 20 MG: 40 INJECTION, SUSPENSION INTRA-ARTICULAR; INTRAMUSCULAR at 07:53

## 2024-12-06 RX ADMIN — TRIAMCINOLONE ACETONIDE 20 MG: 40 INJECTION, SUSPENSION INTRA-ARTICULAR; INTRAMUSCULAR at 07:50

## 2024-12-06 NOTE — NURSING NOTE
Drug Administration Record    Prior to injection, verified patient identity using patient's name and date of birth.  Due to injection administration, patient instructed to remain in clinic for 15 minutes  afterwards, and to report any adverse reaction to me immediately.    Drug Name: triamcinolone acetonide(kenalog)  Dose: 0.5mL of triamcinolone 40mg/mL, 20mg dose  Route administered: ID  NDC #: [address]-1  LOT #: [id number]  : amneal  EXPIRATION DATE: 07/2026    Drug Name: triamcinolone acetonide(kenalog)  Dose: 0.5mL of triamcinolone 40mg/mL, 20mg dose  Route administered: ID  NDC #: [address]-2  LOT #: [id number]  : amneal  EXPIRATION DATE: 07/2026    NO WASTE. 1 Vial used for both injections

## 2024-12-09 DIAGNOSIS — B18.1 CHRONIC VIRAL HEPATITIS B WITHOUT DELTA AGENT AND WITHOUT COMA (H): ICD-10-CM

## 2024-12-09 RX ORDER — ENTECAVIR 0.5 MG/1
0.5 TABLET, FILM COATED ORAL DAILY
Qty: 90 TABLET | Refills: 3 | Status: SHIPPED | OUTPATIENT
Start: 2024-12-09

## 2024-12-16 ENCOUNTER — TELEPHONE (OUTPATIENT)
Dept: RHEUMATOLOGY | Facility: CLINIC | Age: 54
End: 2024-12-16
Payer: COMMERCIAL

## 2024-12-16 NOTE — TELEPHONE ENCOUNTER
Patient confirmed scheduled appointment:  Date: October 24th, 2025  Time: 3pm  Visit type: Return Rheumatology  Provider: Dr. Ferrer  Location: [facility]  Testing/imaging: NA  Additional notes: NA

## 2024-12-18 ENCOUNTER — TELEPHONE (OUTPATIENT)
Dept: DERMATOLOGY | Facility: CLINIC | Age: 54
End: 2024-12-18
Payer: COMMERCIAL

## 2024-12-18 NOTE — TELEPHONE ENCOUNTER
Left Voicemail (1st Attempt) and Sent Mychart (1st Attempt) for the patient to call back and schedule the following:    Appointment type: Return Hidradenitis Suppura   Provider: Sanjiv Sotomayor MD  Return date: around 3/5/2025   Specialty phone number: [phone]  Additional appointment(s) needed: n/a   Additonal Notes: WQ appointment request       Matilde Walsh on 12/18/2024 at 9:30 AM

## 2024-12-30 ENCOUNTER — TELEPHONE (OUTPATIENT)
Dept: DERMATOLOGY | Facility: CLINIC | Age: 54
End: 2024-12-30
Payer: COMMERCIAL

## 2024-12-30 NOTE — TELEPHONE ENCOUNTER
Left Voicemail (2nd Attempt) for the patient to call back and schedule the following:    Appointment type: Return Hidradenitis Suppura   Provider: Sanjiv Sotomayor MD  Return date: around 3/5/2025   Specialty phone number: [phone]  Additional appointment(s) needed: n/a   Additonal Notes: WQ appointment request       Max attempts made to schedule- LVM x2, sent MyC       Matilde Walsh on 12/30/2024 at 11:40 AM

## 2025-03-14 ENCOUNTER — TELEPHONE (OUTPATIENT)
Dept: PULMONOLOGY | Facility: CLINIC | Age: 55
End: 2025-03-14
Payer: COMMERCIAL

## 2025-03-14 NOTE — TELEPHONE ENCOUNTER
We have not been able to reach the patient by telephone to set up a delivery date. If your office speaks with the patient, please ask the patient to call us at [phone] or [phone].    Thank you,  Yas Wagner  Pharmacy Technician II  [facility]

## 2025-03-18 NOTE — TELEPHONE ENCOUNTER
[employer] message sent on 3/17  Patient has been recently active on [employer]    Ivette Marie RN

## 2025-03-24 ENCOUNTER — PATIENT OUTREACH (OUTPATIENT)
Dept: CARE COORDINATION | Facility: CLINIC | Age: 55
End: 2025-03-24
Payer: COMMERCIAL

## 2025-03-24 NOTE — PROGRESS NOTES
Clinical Product Navigator RN reviewed chart; patient on payer product coverage.  Review results:   CPN Initial Information Gathering  Referral Source: Health Plan    Patient identified by their health plan for RN Clinical Product Navigator review.  I (Illness Severity)    Stable   P (Patient Summary)    54 year old female with diagnosis of interstitial lung disease   A (Action List)   None needed at this time   S (Situation Awareness)   Member initially referred to transplant CM as PA request received & approved in April 2024 for lung transplant evaluation at [facility]. Member not engaged in transplant CM. Per communication with [facility] member has decided not to pursue lung transplantation therefore transplant contract/case closed.       Note patient completed pulmonary rehab at [facility] in 2024 and is well connected with her pulmonology team.  No additional action identified by RN Clinical Product Navigator at this time.    Melissa Behl BSN, RN, PHN, CCM  RN Clinical Product Navigator  [phone]

## 2025-04-14 ENCOUNTER — TELEPHONE (OUTPATIENT)
Dept: PULMONOLOGY | Facility: CLINIC | Age: 55
End: 2025-04-14
Payer: COMMERCIAL

## 2025-04-14 NOTE — TELEPHONE ENCOUNTER
PA Initiation    Medication: OFEV 150 MG PO CAPS  Insurance Company: [employer] - Phone [phone] Fax [phone]  Pharmacy Filling the Rx: [facility] - [place], [place] - [facility] [address]  Filling Pharmacy Phone:    Filling Pharmacy Fax:    Start Date: 4/14/2025    Key: [id number]

## 2025-04-15 NOTE — TELEPHONE ENCOUNTER
Prior Authorization Approval    Medication: OFEV 150 MG PO CAPS  Authorization Effective Date: 3/16/2025  Authorization Expiration Date: 4/15/2026  Approved Dose/Quantity: #60 per 30 days  Reference #: Key: [id number]   Insurance Company: [employer] - Phone [phone] Fax [phone]  Expected CoPay: $ 0  CoPay Card Available:      Financial Assistance Needed: NO  [facility] [place] [address] [address]  Pharmacy Notified: No, renewal  Patient Notified: No, renewal

## 2025-04-18 ENCOUNTER — ANCILLARY PROCEDURE (OUTPATIENT)
Dept: CT IMAGING | Facility: CLINIC | Age: 55
End: 2025-04-18
Attending: INTERNAL MEDICINE
Payer: COMMERCIAL

## 2025-04-18 DIAGNOSIS — M33.20 POLYMYOSITIS ASSOCIATED WITH AUTOIMMUNE DISEASE (H): ICD-10-CM

## 2025-04-18 DIAGNOSIS — M35.9 POLYMYOSITIS ASSOCIATED WITH AUTOIMMUNE DISEASE (H): ICD-10-CM

## 2025-04-18 DIAGNOSIS — J84.9 ILD (INTERSTITIAL LUNG DISEASE) (H): ICD-10-CM

## 2025-04-18 PROCEDURE — 99000 SPECIMEN HANDLING OFFICE-LAB: CPT | Performed by: PATHOLOGY

## 2025-04-18 PROCEDURE — 82105 ALPHA-FETOPROTEIN SERUM: CPT | Performed by: PHYSICIAN ASSISTANT

## 2025-04-18 PROCEDURE — 71250 CT THORAX DX C-: CPT | Mod: GC | Performed by: RADIOLOGY

## 2025-04-18 PROCEDURE — 87517 HEPATITIS B DNA QUANT: CPT | Performed by: PHYSICIAN ASSISTANT

## 2025-05-11 DIAGNOSIS — J84.9 ILD (INTERSTITIAL LUNG DISEASE) (H): ICD-10-CM

## 2025-05-11 DIAGNOSIS — L73.2 HIDRADENITIS SUPPURATIVA: ICD-10-CM

## 2025-05-11 DIAGNOSIS — M60.10: ICD-10-CM

## 2025-05-12 ENCOUNTER — RESULTS FOLLOW-UP (OUTPATIENT)
Dept: GASTROENTEROLOGY | Facility: CLINIC | Age: 55
End: 2025-05-12

## 2025-05-12 ENCOUNTER — ANCILLARY PROCEDURE (OUTPATIENT)
Dept: ULTRASOUND IMAGING | Facility: CLINIC | Age: 55
End: 2025-05-12
Attending: PHYSICIAN ASSISTANT
Payer: COMMERCIAL

## 2025-05-12 DIAGNOSIS — I27.29 OTHER SECONDARY PULMONARY HYPERTENSION (H): ICD-10-CM

## 2025-05-12 DIAGNOSIS — B18.1 CHRONIC VIRAL HEPATITIS B WITHOUT DELTA AGENT AND WITHOUT COMA (H): ICD-10-CM

## 2025-05-12 DIAGNOSIS — D84.9 IMMUNOSUPPRESSED STATUS: ICD-10-CM

## 2025-05-12 DIAGNOSIS — J84.9 ILD (INTERSTITIAL LUNG DISEASE) (H): ICD-10-CM

## 2025-05-12 PROCEDURE — 76705 ECHO EXAM OF ABDOMEN: CPT | Mod: TC | Performed by: RADIOLOGY

## 2025-05-12 RX ORDER — NINTEDANIB 150 MG/1
150 CAPSULE ORAL 2 TIMES DAILY
Qty: 60 CAPSULE | Refills: 11 | Status: SHIPPED | OUTPATIENT
Start: 2025-05-12

## 2025-05-13 RX ORDER — SULFAMETHOXAZOLE AND TRIMETHOPRIM 800; 160 MG/1; MG/1
1 TABLET ORAL 2 TIMES DAILY
Qty: 180 TABLET | Refills: 2 | Status: SHIPPED | OUTPATIENT
Start: 2025-05-13

## 2025-05-13 RX ORDER — MYCOPHENOLATE MOFETIL 500 MG/1
TABLET ORAL
Qty: 270 TABLET | Refills: 1 | OUTPATIENT
Start: 2025-05-13

## 2025-05-13 NOTE — TELEPHONE ENCOUNTER
Signed Yesterday (5/12/2025):   mycophenolate (GENERIC EQUIVALENT) 500 MG tablet   Sig: Take 2 tablets (1,000 mg) by mouth 2 times daily.   Disp: 360 tablet    Refills: 3   Signed by: Garrison Bustillo MD

## 2025-05-13 NOTE — TELEPHONE ENCOUNTER
Last Written Prescription:  sulfamethoxazole-trimethoprim (BACTRIM DS) 800-160 MG tablet  1 tablet, 2 TIMES DAILY           Summary: TAKE 1 TABLET BY MOUTH TWICE A DAY, Disp-180 tablet, R-3, E-Prescribe  Dose, Route, Frequency: 1 tablet, Oral, 2 TIMES DAILYStart: 04/04/2024Ordered On: 04/04/2024Pharmacy: [id number] - [place], [place] - [address] [place] Associated: Taking: Long-term: Med Note:    Patient not taking. Reported on 4/18/2025            Directions: TAKE 1 TABLET BY MOUTH TWICE A DAY  Ordering Department: [facility]  Authorized By: Sanjiv Sotomayor MD  Dispense: 180 tablet  Refills: 3 ordered       ----------------------  Last Visit Date: 12/05/2024 Office Visit Derm - CONCEPCION Sotomayor   Future Visit Date: None  ----------------------      Refill decision: Medication refilled per  Medication Refill in Ambulatory Care  policy.   []  If no future appointment scheduled: Route to Clinic Coordinators to contact the pt for appointment.      Refill decision: Medication unable to be refilled by RN due to: Medication not included in refill protocol policy  and Other:  PJP Prophylactic Antibiotics Failed   Protocol Details Has GFR on file in past 12 months and most recent value is normal    Medication indicated for associated diagnosis    Most recent serum potassium is <5 mmol/L, if drug is Bactrim in past 3 months    Authorizing provider s specialty is infectious disease, Hemonc, transplant, pulmonary, rheum     Cystic Fibrosis Subgroup - CYSTIC FIBROSIS ANTIBIOTIC Failed   Protocol Details Always Fail Criteria: chart review required    Medication indicated for associated diagnosis         Request from pharmacy:  Requested Prescriptions   Pending Prescriptions Disp Refills    sulfamethoxazole-trimethoprim (BACTRIM DS) 800-160 MG tablet [Pharmacy Med Name: SULFAMETHOXAZOLE-TMP DS TABLET] 180 tablet 3     Sig: TAKE 1 TABLET BY MOUTH TWICE A DAY       Acne/Rosacea Medications (Oral) Protocol Failed  - 5/13/2025  4:00 PM        Failed - Medication indicated for associated diagnosis     The medication is prescribed for one or more of the following conditions:    Acne   Rosacea          Passed - Patient is 12 years of age or older        Passed - Medication is active on med list and the sig matches. RN to manually verify dose and sig if red X/fail.     If the protocol passes (green check), you do not need to verify med dose and sig.    A prescription matches if they are the same clinical intention.    For Example: once daily and every morning are the same.    The protocol can not identify upper and lower case letters as matching and will fail.     For Example: Take 1 tablet (50 mg) by mouth daily     TAKE 1 TABLET (50 MG) BY MOUTH DAILY    For all fails (red x), verify dose and sig.    If the refill does match what is on file, the RN can still proceed to approve the refill request.       If they do not match, route to the appropriate provider.             Passed - Recent (12 month) or future (90 days) visit with authorizing provider s specialty     The patient must have completed an in-person or virtual visit within the past 12 months or has a future visit scheduled within the next 90 days with the authorizing provider s specialty.  Urgent care and e-visits do not qualify as an office visit for this protocol.          Passed - No active pregnancy on record        Passed - No positive prenancy test is past 12 months       Cystic Fibrosis Subgroup - CYSTIC FIBROSIS ANTIBIOTIC Failed - 5/13/2025  4:00 PM        Failed - Always Fail Criteria: chart review required     Nurse to review chart. No current use of fluoroquinolones Okay to approve azithromycin for pseudomonas suppression or airway inflammation,at either 250mg daily or 500mg MWF.? Okay to approve Bactrim refill if drug is prescribed for burkholderia capacia suppression at full Bactrim dose or if patient is on PJP prophylaxis (e.g. on > prednisone 20 mg daily  or equivalent steroid dose) taking Bactrim single strength daily.?            Failed - Medication indicated for associated diagnosis     Medication is associated with one or more of the following diagnoses:    Cystic Fibrosis   Bronchiectasis               Passed - Medication is active on med list and the sig matches. RN to manually verify dose and sig if red X/fail.     If the protocol passes (green check), you do not need to verify med dose and sig.    A prescription matches if they are the same clinical intention.    For Example: once daily and every morning are the same.    The protocol can not identify upper and lower case letters as matching and will fail.     For Example: Take 1 tablet (50 mg) by mouth daily     TAKE 1 TABLET (50 MG) BY MOUTH DAILY    For all fails (red x), verify dose and sig.    If the refill does match what is on file, the RN can still proceed to approve the refill request.       If they do not match, route to the appropriate provider.             Passed - Recent (12 mo) or future (90 days) visit within the authorizing provider's specialty     The patient must have completed an in-person or virtual visit within the past 12 months or has a future visit scheduled within the next 90 days with the authorizing provider s specialty.  Urgent care and e-visits do not qualify as an office visit for this protocol.          Passed - Patient is age 18 or older       PJP Prophylactic Antibiotics Failed - 5/13/2025  4:00 PM        Failed - Has GFR on file in past 12 months and most recent value is normal        Failed - Medication indicated for associated diagnosis     Medication is associated with one or more of the following diagnoses:    PJP prophylaxis for HIV   Immunosuppressed patients            Failed - Most recent serum potassium is <5 mmol/L, if drug is Bactrim in past 3 months        Failed - Authorizing provider s specialty is infectious disease, Hemonc, transplant, pulmonary, rheum         Passed - Medication is active on med list and the sig matches. RN to manually verify dose and sig if red X/fail.     There is an originating order for the medication on the patient's medication list. Missing or discontinued medications are not active orders.    If the protocol passes (green check), you do not need to verify med dose and sig.    A prescription matches if they are the same clinical intention.    For Example: once daily and every morning are the same.    For all fails (red x), verify dose and sig.    If the refill does match what is on file, the RN can still proceed to approve the refill request.     If they do not match, route to the appropriate provider.          Passed - Most Recent (12 month) or future (90 days) visit with authorizing provider s specialty     The patient must have completed an in-person or virtual visit within the past 12 months or has a future visit scheduled within the next 90 days with the authorizing provider s specialty.  Urgent care and e-visits do not qualify as an office visit for this protocol.          Passed - Patient is 18 years of age or older

## 2025-07-03 ENCOUNTER — OFFICE VISIT (OUTPATIENT)
Dept: DERMATOLOGY | Facility: CLINIC | Age: 55
End: 2025-07-03
Payer: COMMERCIAL

## 2025-07-03 VITALS — BODY MASS INDEX: 27.68 KG/M2 | WEIGHT: 171.5 LBS

## 2025-07-03 DIAGNOSIS — L73.2 HIDRADENITIS SUPPURATIVA: ICD-10-CM

## 2025-07-03 RX ORDER — SULFAMETHOXAZOLE AND TRIMETHOPRIM 800; 160 MG/1; MG/1
1 TABLET ORAL 2 TIMES DAILY
Qty: 180 TABLET | Refills: 2 | Status: SHIPPED | OUTPATIENT
Start: 2025-07-03

## 2025-07-03 RX ORDER — CLOBETASOL PROPIONATE 0.5 MG/G
OINTMENT TOPICAL 2 TIMES DAILY
Qty: 60 G | Refills: 0 | Status: SHIPPED | OUTPATIENT
Start: 2025-07-03

## 2025-07-03 ASSESSMENT — PAIN SCALES - GENERAL: PAINLEVEL_OUTOF10: NO PAIN (0)

## 2025-07-03 NOTE — PATIENT INSTRUCTIONS
Plan   - Will continue bactrim twice a day   - Clobetasol as needed for flares    Follow-up in 6 months

## 2025-07-03 NOTE — NURSING NOTE
Dermatology Rooming Note    Angelica Jett's goals for this visit include:   Chief Complaint   Patient presents with    Derm Problem     HS - pt reports that things are going well, one small flare in R axilla     Diann Echeverria [place]

## 2025-07-03 NOTE — PROGRESS NOTES
[facility] Dermatology Note  Encounter Date: Jul 3, 2025  Office Visit     Dermatology Problem List:  1. Hidradenitis suppurativa  - Current Tx: Bactrim 800-160 BID ongoing mycophenolate, clobetasol ointment, s/p ILK (12/5/2024, 9/7/23)  - Prior Tx: prednisone, doxycycline     MHx: Interstitial lung disease. Chronic hepatitis B (taking Entecavir). Polymyositis 2005 (taking mycophenolate).    ____________________________________________    Assessment & Plan:  #  Hidradenitis suppurativa +/- PG vs HS with PG-phenotype.   Patient appears improved and each axilla are closed. Regimen she is on seems to be progressing in the right direction so she will be continued on the Bactrim for the next six months.     -Continue Bactrim 800-160 twice daily ongoing  -Continue clobetasol as needed for flares and itching  -Reviewed creatinine levels from 5/31/2023 to 10/10/2024 and her levels ranged from 0.79 to 1.1 (normal) which is not associated with bactrim  .    PROCEDURE:     Follow-up: six months via in-person    Staff and Medical Student: Idania Paez, MS3  [facility]    ____________________________________________    CC: Follow-up HS    HPI:  Ms. Angelica Jett is a(n) 54 year old female who presents today as a return patient for HS.    Last seen in clinic 12/5/24.Today, reports right and left axilla have improved. She applies clobetasol every day with bandage changes, which also helps with itching.  Is not using the resorcinol. She was concerned about creatinine level increasing due to being on bactrim.  Patient is otherwise feeling well, without additional skin concerns.    HS Nurse Assessment        9/12/2024     4:10 PM 10/10/2024     4:14 PM 12/5/2024     4:10 PM   Nurse Assessment Data   Over the past 30 days how many old lesions flared back up? 1 1 2   Over the past 30 days how many new lesions did you get? 0 1 0   Over the past week, how many dressing changes do you do each day? 0 3+  1   Over the past week, has your wound drainage been: None Moderate Mild   Rate your HS overall from 0 - 10 (0 = no disease, 10 = worst) over the past week:  1 5 1   Rate your pain score from 0 - 10 (0 = no disease, 10 = worst) for the most painful/symptomatic lesion in the past week:  1 8 1   Over the past week, how much has HS influenced your quality of life? not at all slightly slightly     Physical Exam:  Vitals: There were no vitals taken for this visit.  SKIN: Focused examination of bilateral axilla was performed.  - Bilateral axilla reveal improvement and both sides nearly closed.       Medications:  Current Outpatient Medications   Medication Sig Dispense Refill    Calcium Citrate-Vitamin D3 315-6.25 MG-MCG TABS Take 2 tablets by mouth      clobetasol (TEMOVATE) 0.05 % external ointment Apply topically 2 times daily 60 g 0    dextromethorphan-guaiFENesin (MUCINEX DM)  MG per 12 hr tablet Take 1 tablet by mouth every 12 hours.      entecavir (BARACLUDE) 0.5 MG tablet Take 1 tablet (0.5 mg) by mouth daily. 90 tablet 3    ibuprofen (ADVIL/MOTRIN) 600 MG tablet Take 1 tablet by mouth 4 times daily as needed (Patient not taking: Reported on 4/18/2025)      mycophenolate (GENERIC EQUIVALENT) 500 MG tablet Take 2 tablets (1,000 mg) by mouth 2 times daily. 360 tablet 3    mycophenolate (GENERIC EQUIVALENT) 500 MG tablet TAKE 2 TABLETS BY MOUTH EVERY MORNING, THEN 1 TABLET EVERY NIGHT (TOTAL 3 TABS A DAY) (Patient not taking: Reported on 4/18/2025) 270 tablet 1    OFEV 150 MG capsule TAKE ONE CAPSULE BY MOUTH TWICE A DAY 60 capsule 11    Resorcinol POWD Resorcinol 15% in vanicream twice day as needed for flares (Patient not taking: Reported on 4/18/2025) 30 g 11    sulfamethoxazole-trimethoprim (BACTRIM DS) 800-160 MG tablet TAKE 1 TABLET BY MOUTH TWICE A  tablet 2    Vitamin D3 (VITAMIN D, CHOLECALCIFEROL,) 25 mcg (1000 units) tablet Take by mouth daily       No current facility-administered medications  for this visit.      Past Medical History:   Patient Active Problem List   Diagnosis    Pulmonary hypertension (H)    Chronic viral hepatitis B without delta agent and without coma (H)    Interstitial lung disease (H)    Abscess    Hidradenitis axillaris    Benign tubular adenoma of large intestine    Diastolic dysfunction    GERD (gastroesophageal reflux disease)    OP (osteoporosis)    Other irritable bowel syndrome    Polymyositis (H)    Immunosuppressed status    Screening for cervical cancer     Past Medical History:   Diagnosis Date    Diastolic dysfunction 2/24/2014

## 2025-07-08 ENCOUNTER — MYC MEDICAL ADVICE (OUTPATIENT)
Dept: PULMONOLOGY | Facility: CLINIC | Age: 55
End: 2025-07-08
Payer: COMMERCIAL

## 2025-07-21 ENCOUNTER — DOCUMENTATION ONLY (OUTPATIENT)
Dept: PULMONOLOGY | Facility: CLINIC | Age: 55
End: 2025-07-21
Payer: COMMERCIAL

## 2025-07-21 DIAGNOSIS — J84.9 ILD (INTERSTITIAL LUNG DISEASE) (H): Primary | ICD-10-CM

## 2025-07-21 NOTE — PROGRESS NOTES
"Per Dr. Bustillo  \"Patient would need CBC with diff, CMP and cystatin C the day she sees me. \"    -ZOILA Allen  "

## 2025-07-21 NOTE — PROGRESS NOTES
Hi! Please place future laboratory orders if needed for upcoming appointment on 7.22.2025. If labs are not due, please have your care team contact the patient to cancel lab only appointment.     Thank you!  [facility]

## 2025-07-22 ENCOUNTER — LAB (OUTPATIENT)
Dept: LAB | Facility: CLINIC | Age: 55
End: 2025-07-22
Payer: COMMERCIAL

## 2025-07-22 ENCOUNTER — TELEPHONE (OUTPATIENT)
Dept: GASTROENTEROLOGY | Facility: CLINIC | Age: 55
End: 2025-07-22

## 2025-07-22 DIAGNOSIS — J84.9 ILD (INTERSTITIAL LUNG DISEASE) (H): ICD-10-CM

## 2025-07-22 LAB
BASOPHILS # BLD AUTO: 0 10E3/UL (ref 0–0.2)
BASOPHILS NFR BLD AUTO: 1 %
EOSINOPHIL # BLD AUTO: 0.3 10E3/UL (ref 0–0.7)
EOSINOPHIL NFR BLD AUTO: 4 %
ERYTHROCYTE [DISTWIDTH] IN BLOOD BY AUTOMATED COUNT: 14.1 % (ref 10–15)
HCT VFR BLD AUTO: 45.9 % (ref 35–47)
HGB BLD-MCNC: 13.9 G/DL (ref 11.7–15.7)
IMM GRANULOCYTES # BLD: 0 10E3/UL
IMM GRANULOCYTES NFR BLD: 0 %
LYMPHOCYTES # BLD AUTO: 3.8 10E3/UL (ref 0.8–5.3)
LYMPHOCYTES NFR BLD AUTO: 48 %
MCH RBC QN AUTO: 24.6 PG (ref 26.5–33)
MCHC RBC AUTO-ENTMCNC: 30.3 G/DL (ref 31.5–36.5)
MCV RBC AUTO: 81 FL (ref 78–100)
MONOCYTES # BLD AUTO: 0.8 10E3/UL (ref 0–1.3)
MONOCYTES NFR BLD AUTO: 10 %
NEUTROPHILS # BLD AUTO: 3 10E3/UL (ref 1.6–8.3)
NEUTROPHILS NFR BLD AUTO: 38 %
PLATELET # BLD AUTO: 210 10E3/UL (ref 150–450)
RBC # BLD AUTO: 5.66 10E6/UL (ref 3.8–5.2)
WBC # BLD AUTO: 8 10E3/UL (ref 4–11)

## 2025-07-22 PROCEDURE — 80053 COMPREHEN METABOLIC PANEL: CPT

## 2025-07-22 PROCEDURE — 36415 COLL VENOUS BLD VENIPUNCTURE: CPT

## 2025-07-22 PROCEDURE — 82610 CYSTATIN C: CPT

## 2025-07-22 PROCEDURE — 85025 COMPLETE CBC W/AUTO DIFF WBC: CPT

## 2025-07-22 NOTE — TELEPHONE ENCOUNTER
Patient confirmed scheduled appointment:     Date: 11/25  Time: 8:15 am  Appointment Type: Return Liver  Provider: Justina Dinh PA-C  Location: [place]  Testing/imaging: Lab  Additional Notes:

## 2025-07-23 LAB
ALBUMIN SERPL BCG-MCNC: 4.2 G/DL (ref 3.5–5.2)
ALP SERPL-CCNC: 69 U/L (ref 40–150)
ALT SERPL W P-5'-P-CCNC: 29 U/L (ref 0–50)
ANION GAP SERPL CALCULATED.3IONS-SCNC: 9 MMOL/L (ref 7–15)
AST SERPL W P-5'-P-CCNC: 33 U/L (ref 0–45)
BILIRUB SERPL-MCNC: 0.2 MG/DL
BUN SERPL-MCNC: 17.8 MG/DL (ref 6–20)
CALCIUM SERPL-MCNC: 9.6 MG/DL (ref 8.8–10.4)
CHLORIDE SERPL-SCNC: 105 MMOL/L (ref 98–107)
CREAT SERPL-MCNC: 0.94 MG/DL (ref 0.51–0.95)
CYSTATIN C (ROCHE): 1 MG/L (ref 0.6–1)
EGFRCR SERPLBLD CKD-EPI 2021: 71 ML/MIN/1.73M2
GFR/BSA.PRED SERPLBLD CYS-BASED-ARV: 74 ML/MIN/1.73M2
GLUCOSE SERPL-MCNC: 93 MG/DL (ref 70–99)
HCO3 SERPL-SCNC: 26 MMOL/L (ref 22–29)
POTASSIUM SERPL-SCNC: 4.8 MMOL/L (ref 3.4–5.3)
PROT SERPL-MCNC: 8.1 G/DL (ref 6.4–8.3)
SODIUM SERPL-SCNC: 140 MMOL/L (ref 135–145)

## (undated) RX ORDER — TRIAMCINOLONE ACETONIDE 40 MG/ML
INJECTION, SUSPENSION INTRA-ARTICULAR; INTRAMUSCULAR
Status: DISPENSED
Start: 2023-09-07

## (undated) RX ORDER — TRIAMCINOLONE ACETONIDE 40 MG/ML
INJECTION, SUSPENSION INTRA-ARTICULAR; INTRAMUSCULAR
Status: DISPENSED
Start: 2024-12-05